# Patient Record
Sex: MALE | Race: WHITE | NOT HISPANIC OR LATINO | Employment: OTHER | URBAN - METROPOLITAN AREA
[De-identification: names, ages, dates, MRNs, and addresses within clinical notes are randomized per-mention and may not be internally consistent; named-entity substitution may affect disease eponyms.]

---

## 2017-02-28 ENCOUNTER — TRANSCRIBE ORDERS (OUTPATIENT)
Dept: LAB | Facility: CLINIC | Age: 67
End: 2017-02-28

## 2017-02-28 ENCOUNTER — APPOINTMENT (OUTPATIENT)
Dept: LAB | Facility: CLINIC | Age: 67
End: 2017-02-28
Payer: COMMERCIAL

## 2017-02-28 DIAGNOSIS — N18.30 CHRONIC KIDNEY DISEASE, STAGE III (MODERATE) (HCC): ICD-10-CM

## 2017-02-28 DIAGNOSIS — E78.5 HYPERLIPIDEMIA: ICD-10-CM

## 2017-02-28 DIAGNOSIS — M19.90 OSTEOARTHRITIS: ICD-10-CM

## 2017-02-28 DIAGNOSIS — R94.6 ABNORMAL RESULTS OF THYROID FUNCTION STUDIES: ICD-10-CM

## 2017-02-28 DIAGNOSIS — M25.562 PAIN IN LEFT KNEE: ICD-10-CM

## 2017-02-28 DIAGNOSIS — I10 ESSENTIAL (PRIMARY) HYPERTENSION: ICD-10-CM

## 2017-02-28 DIAGNOSIS — R73.9 HYPERGLYCEMIA: ICD-10-CM

## 2017-02-28 DIAGNOSIS — R26.9 ABNORMALITY OF GAIT AND MOBILITY: ICD-10-CM

## 2017-02-28 LAB
ALBUMIN SERPL BCP-MCNC: 4 G/DL (ref 3.5–5)
ALP SERPL-CCNC: 80 U/L (ref 46–116)
ALT SERPL W P-5'-P-CCNC: 21 U/L (ref 12–78)
ANION GAP SERPL CALCULATED.3IONS-SCNC: 6 MMOL/L (ref 4–13)
AST SERPL W P-5'-P-CCNC: 14 U/L (ref 5–45)
BILIRUB SERPL-MCNC: 1.05 MG/DL (ref 0.2–1)
BILIRUB UR QL STRIP: NEGATIVE
BUN SERPL-MCNC: 24 MG/DL (ref 5–25)
CALCIUM SERPL-MCNC: 9.5 MG/DL (ref 8.3–10.1)
CHLORIDE SERPL-SCNC: 105 MMOL/L (ref 100–108)
CHOLEST SERPL-MCNC: 184 MG/DL (ref 50–200)
CLARITY UR: CLEAR
CO2 SERPL-SCNC: 30 MMOL/L (ref 21–32)
COLOR UR: YELLOW
CREAT SERPL-MCNC: 1.63 MG/DL (ref 0.6–1.3)
ERYTHROCYTE [DISTWIDTH] IN BLOOD BY AUTOMATED COUNT: 12.5 % (ref 11.6–15.1)
EST. AVERAGE GLUCOSE BLD GHB EST-MCNC: 111 MG/DL
GFR SERPL CREATININE-BSD FRML MDRD: 42.4 ML/MIN/1.73SQ M
GLUCOSE SERPL-MCNC: 101 MG/DL (ref 65–140)
GLUCOSE UR STRIP-MCNC: NEGATIVE MG/DL
HBA1C MFR BLD: 5.5 % (ref 4.2–6.3)
HCT VFR BLD AUTO: 43.7 % (ref 36.5–49.3)
HDLC SERPL-MCNC: 39 MG/DL (ref 40–60)
HGB BLD-MCNC: 14.8 G/DL (ref 12–17)
HGB UR QL STRIP.AUTO: NEGATIVE
KETONES UR STRIP-MCNC: NEGATIVE MG/DL
LDLC SERPL CALC-MCNC: 104 MG/DL (ref 0–100)
LEUKOCYTE ESTERASE UR QL STRIP: NEGATIVE
MCH RBC QN AUTO: 30.5 PG (ref 26.8–34.3)
MCHC RBC AUTO-ENTMCNC: 33.9 G/DL (ref 31.4–37.4)
MCV RBC AUTO: 90 FL (ref 82–98)
NITRITE UR QL STRIP: NEGATIVE
PH UR STRIP.AUTO: 6.5 [PH] (ref 4.5–8)
PLATELET # BLD AUTO: 279 THOUSANDS/UL (ref 149–390)
PMV BLD AUTO: 9.5 FL (ref 8.9–12.7)
POTASSIUM SERPL-SCNC: 4.3 MMOL/L (ref 3.5–5.3)
PROT SERPL-MCNC: 7.2 G/DL (ref 6.4–8.2)
PROT UR STRIP-MCNC: NEGATIVE MG/DL
RBC # BLD AUTO: 4.86 MILLION/UL (ref 3.88–5.62)
SODIUM SERPL-SCNC: 141 MMOL/L (ref 136–145)
SP GR UR STRIP.AUTO: 1.02 (ref 1–1.03)
TRIGL SERPL-MCNC: 206 MG/DL
TSH SERPL DL<=0.05 MIU/L-ACNC: 3.35 UIU/ML (ref 0.36–3.74)
UROBILINOGEN UR QL STRIP.AUTO: 0.2 E.U./DL
WBC # BLD AUTO: 8.32 THOUSAND/UL (ref 4.31–10.16)

## 2017-02-28 PROCEDURE — 36415 COLL VENOUS BLD VENIPUNCTURE: CPT

## 2017-02-28 PROCEDURE — 83036 HEMOGLOBIN GLYCOSYLATED A1C: CPT

## 2017-02-28 PROCEDURE — 85027 COMPLETE CBC AUTOMATED: CPT

## 2017-02-28 PROCEDURE — 80053 COMPREHEN METABOLIC PANEL: CPT

## 2017-02-28 PROCEDURE — 84443 ASSAY THYROID STIM HORMONE: CPT

## 2017-02-28 PROCEDURE — 80061 LIPID PANEL: CPT

## 2017-02-28 PROCEDURE — 81003 URINALYSIS AUTO W/O SCOPE: CPT

## 2017-03-06 ENCOUNTER — ALLSCRIPTS OFFICE VISIT (OUTPATIENT)
Dept: OTHER | Facility: OTHER | Age: 67
End: 2017-03-06

## 2017-04-24 ENCOUNTER — TRANSCRIBE ORDERS (OUTPATIENT)
Dept: LAB | Facility: CLINIC | Age: 67
End: 2017-04-24

## 2017-04-24 ENCOUNTER — APPOINTMENT (OUTPATIENT)
Dept: LAB | Facility: CLINIC | Age: 67
End: 2017-04-24
Payer: COMMERCIAL

## 2017-04-24 DIAGNOSIS — N18.30 CHRONIC KIDNEY DISEASE, STAGE III (MODERATE) (HCC): ICD-10-CM

## 2017-04-24 DIAGNOSIS — R94.6 NONSPECIFIC ABNORMAL RESULTS OF THYROID FUNCTION STUDY: ICD-10-CM

## 2017-04-24 DIAGNOSIS — N40.0 BENIGN NODULAR PROSTATIC HYPERPLASIA, PRESENCE OF LOWER URINARY TRACT SYMPTOMS UNSPECIFIED: Primary | ICD-10-CM

## 2017-04-24 DIAGNOSIS — C64.9 ADENOCARCINOMA, RENAL CELL, UNSPECIFIED LATERALITY (HCC): ICD-10-CM

## 2017-04-24 DIAGNOSIS — M19.90 SENILE ARTHRITIS: ICD-10-CM

## 2017-04-24 DIAGNOSIS — E78.5 OTHER AND UNSPECIFIED HYPERLIPIDEMIA: ICD-10-CM

## 2017-04-24 DIAGNOSIS — I10 ESSENTIAL HYPERTENSION, MALIGNANT: ICD-10-CM

## 2017-04-24 DIAGNOSIS — R26.9 ABNORMALITY OF GAIT: ICD-10-CM

## 2017-04-24 DIAGNOSIS — K63.5 HYPERPLASTIC POLYP OF INTESTINE: ICD-10-CM

## 2017-04-24 DIAGNOSIS — R73.9 BLOOD GLUCOSE ELEVATED: ICD-10-CM

## 2017-04-24 LAB — HEMOCCULT STL QL IA: NEGATIVE

## 2017-04-24 PROCEDURE — G0328 FECAL BLOOD SCRN IMMUNOASSAY: HCPCS

## 2017-04-25 ENCOUNTER — GENERIC CONVERSION - ENCOUNTER (OUTPATIENT)
Dept: OTHER | Facility: OTHER | Age: 67
End: 2017-04-25

## 2017-06-01 ENCOUNTER — OFFICE VISIT (OUTPATIENT)
Dept: URGENT CARE | Facility: MEDICAL CENTER | Age: 67
End: 2017-06-01
Payer: COMMERCIAL

## 2017-06-01 PROCEDURE — 99213 OFFICE O/P EST LOW 20 MIN: CPT

## 2017-09-06 DIAGNOSIS — N18.30 CHRONIC KIDNEY DISEASE, STAGE III (MODERATE) (HCC): ICD-10-CM

## 2017-09-06 DIAGNOSIS — M19.90 OSTEOARTHRITIS: ICD-10-CM

## 2017-09-06 DIAGNOSIS — E78.5 HYPERLIPIDEMIA: ICD-10-CM

## 2017-09-06 DIAGNOSIS — N40.0 ENLARGED PROSTATE WITHOUT LOWER URINARY TRACT SYMPTOMS (LUTS): ICD-10-CM

## 2017-09-06 DIAGNOSIS — R73.9 HYPERGLYCEMIA: ICD-10-CM

## 2017-09-06 DIAGNOSIS — N52.9 MALE ERECTILE DYSFUNCTION: ICD-10-CM

## 2017-09-06 DIAGNOSIS — I10 ESSENTIAL (PRIMARY) HYPERTENSION: ICD-10-CM

## 2017-09-06 DIAGNOSIS — K63.5 POLYP OF COLON: ICD-10-CM

## 2017-09-06 DIAGNOSIS — Z11.59 ENCOUNTER FOR SCREENING FOR OTHER VIRAL DISEASES: ICD-10-CM

## 2017-09-14 ENCOUNTER — TRANSCRIBE ORDERS (OUTPATIENT)
Dept: LAB | Facility: CLINIC | Age: 67
End: 2017-09-14

## 2017-09-14 ENCOUNTER — APPOINTMENT (OUTPATIENT)
Dept: LAB | Facility: CLINIC | Age: 67
End: 2017-09-14
Payer: COMMERCIAL

## 2017-09-14 DIAGNOSIS — M19.90 OSTEOARTHRITIS: ICD-10-CM

## 2017-09-14 DIAGNOSIS — N52.9 MALE ERECTILE DYSFUNCTION: ICD-10-CM

## 2017-09-14 DIAGNOSIS — I10 ESSENTIAL (PRIMARY) HYPERTENSION: ICD-10-CM

## 2017-09-14 DIAGNOSIS — E78.5 HYPERLIPIDEMIA: ICD-10-CM

## 2017-09-14 DIAGNOSIS — N18.30 CHRONIC KIDNEY DISEASE, STAGE III (MODERATE) (HCC): ICD-10-CM

## 2017-09-14 DIAGNOSIS — N40.0 ENLARGED PROSTATE WITHOUT LOWER URINARY TRACT SYMPTOMS (LUTS): ICD-10-CM

## 2017-09-14 DIAGNOSIS — K63.5 POLYP OF COLON: ICD-10-CM

## 2017-09-14 DIAGNOSIS — R73.9 HYPERGLYCEMIA: ICD-10-CM

## 2017-09-14 LAB
ALBUMIN SERPL BCP-MCNC: 3.8 G/DL (ref 3.5–5)
ALP SERPL-CCNC: 84 U/L (ref 46–116)
ALT SERPL W P-5'-P-CCNC: 18 U/L (ref 12–78)
ANION GAP SERPL CALCULATED.3IONS-SCNC: 8 MMOL/L (ref 4–13)
AST SERPL W P-5'-P-CCNC: 14 U/L (ref 5–45)
BILIRUB SERPL-MCNC: 1.13 MG/DL (ref 0.2–1)
BILIRUB UR QL STRIP: NEGATIVE
BUN SERPL-MCNC: 23 MG/DL (ref 5–25)
CALCIUM SERPL-MCNC: 9.7 MG/DL (ref 8.3–10.1)
CHLORIDE SERPL-SCNC: 105 MMOL/L (ref 100–108)
CHOLEST SERPL-MCNC: 166 MG/DL (ref 50–200)
CLARITY UR: CLEAR
CO2 SERPL-SCNC: 27 MMOL/L (ref 21–32)
COLOR UR: YELLOW
CREAT SERPL-MCNC: 1.6 MG/DL (ref 0.6–1.3)
ERYTHROCYTE [DISTWIDTH] IN BLOOD BY AUTOMATED COUNT: 12.4 % (ref 11.6–15.1)
EST. AVERAGE GLUCOSE BLD GHB EST-MCNC: 114 MG/DL
GFR SERPL CREATININE-BSD FRML MDRD: 44 ML/MIN/1.73SQ M
GLUCOSE P FAST SERPL-MCNC: 102 MG/DL (ref 65–99)
GLUCOSE UR STRIP-MCNC: NEGATIVE MG/DL
HBA1C MFR BLD: 5.6 % (ref 4.2–6.3)
HCT VFR BLD AUTO: 41.1 % (ref 36.5–49.3)
HDLC SERPL-MCNC: 32 MG/DL (ref 40–60)
HGB BLD-MCNC: 14.5 G/DL (ref 12–17)
HGB UR QL STRIP.AUTO: NEGATIVE
KETONES UR STRIP-MCNC: NEGATIVE MG/DL
LDLC SERPL CALC-MCNC: 87 MG/DL (ref 0–100)
LEUKOCYTE ESTERASE UR QL STRIP: NEGATIVE
MCH RBC QN AUTO: 30.9 PG (ref 26.8–34.3)
MCHC RBC AUTO-ENTMCNC: 35.3 G/DL (ref 31.4–37.4)
MCV RBC AUTO: 87 FL (ref 82–98)
NITRITE UR QL STRIP: NEGATIVE
PH UR STRIP.AUTO: 6 [PH] (ref 4.5–8)
PLATELET # BLD AUTO: 276 THOUSANDS/UL (ref 149–390)
PMV BLD AUTO: 9.5 FL (ref 8.9–12.7)
POTASSIUM SERPL-SCNC: 4.4 MMOL/L (ref 3.5–5.3)
PROT SERPL-MCNC: 6.9 G/DL (ref 6.4–8.2)
PROT UR STRIP-MCNC: NEGATIVE MG/DL
RBC # BLD AUTO: 4.7 MILLION/UL (ref 3.88–5.62)
SODIUM SERPL-SCNC: 140 MMOL/L (ref 136–145)
SP GR UR STRIP.AUTO: 1.02 (ref 1–1.03)
T4 FREE SERPL-MCNC: 0.92 NG/DL (ref 0.76–1.46)
TRIGL SERPL-MCNC: 236 MG/DL
TSH SERPL DL<=0.05 MIU/L-ACNC: 4.14 UIU/ML (ref 0.36–3.74)
UROBILINOGEN UR QL STRIP.AUTO: 0.2 E.U./DL
WBC # BLD AUTO: 9.35 THOUSAND/UL (ref 4.31–10.16)

## 2017-09-14 PROCEDURE — 84439 ASSAY OF FREE THYROXINE: CPT

## 2017-09-14 PROCEDURE — 85027 COMPLETE CBC AUTOMATED: CPT

## 2017-09-14 PROCEDURE — 80053 COMPREHEN METABOLIC PANEL: CPT

## 2017-09-14 PROCEDURE — 81003 URINALYSIS AUTO W/O SCOPE: CPT

## 2017-09-14 PROCEDURE — 80061 LIPID PANEL: CPT

## 2017-09-14 PROCEDURE — 84443 ASSAY THYROID STIM HORMONE: CPT

## 2017-09-14 PROCEDURE — 83036 HEMOGLOBIN GLYCOSYLATED A1C: CPT

## 2017-09-14 PROCEDURE — 36415 COLL VENOUS BLD VENIPUNCTURE: CPT

## 2017-09-20 ENCOUNTER — ALLSCRIPTS OFFICE VISIT (OUTPATIENT)
Dept: OTHER | Facility: OTHER | Age: 67
End: 2017-09-20

## 2018-01-12 VITALS
SYSTOLIC BLOOD PRESSURE: 126 MMHG | DIASTOLIC BLOOD PRESSURE: 82 MMHG | WEIGHT: 269 LBS | HEART RATE: 80 BPM | BODY MASS INDEX: 31.09 KG/M2

## 2018-01-12 VITALS
DIASTOLIC BLOOD PRESSURE: 76 MMHG | HEART RATE: 68 BPM | HEIGHT: 78 IN | WEIGHT: 274.4 LBS | SYSTOLIC BLOOD PRESSURE: 136 MMHG | BODY MASS INDEX: 31.75 KG/M2

## 2018-01-16 NOTE — RESULT NOTES
Verified Results  (1) OCCULT BLOOD, FECAL IMMUNOCHEMICAL TEST [id number] 10:42AM Alex Maradiaga     Test Name Result Flag Reference   OCCULT BLD, FECAL IMMUNOLOGICAL Negative  Negative   Performed by Fecal Immunochemical Test

## 2018-01-18 NOTE — RESULT NOTES
Verified Results  (1) CBC/ PLT (NO DIFF) [id number] 09:22AM Reymundo Kussmaul Order Number: [id number]     Test Name Result Flag Reference   HEMATOCRIT 41 6 %  36 5-49 3   HEMOGLOBIN 14 0 g/dL  12 0-17 0   MCHC 33 7 g/dL  31 4-37 4   MCH 30 0 pg  26 8-34 3   MCV 89 fL  82-98   PLATELET COUNT 027 Thousands/uL  149-390   RBC COUNT 4 67 Million/uL  3 88-5 62   RDW 12 7 %  11 6-15 1   WBC COUNT 7 92 Thousand/uL  4 31-10 16   MPV 9 3 fL  8 9-12 7     (1) COMPREHENSIVE METABOLIC PANEL [id number] 19:67LB Lanny Maradiaga Order Number: [id number]     Test Name Result Flag Reference   GLUCOSE,RANDM 106 mg/dL     If the patient is fasting, the ADA then defines impaired fasting glucose as > 100 mg/dL and diabetes as > or equal to 123 mg/dL  SODIUM 141 mmol/L  136-145   POTASSIUM 4 4 mmol/L  3 5-5 3   CHLORIDE 108 mmol/L  100-108   CARBON DIOXIDE 26 mmol/L  21-32   ANION GAP (CALC) 7 mmol/L  4-13   BLOOD UREA NITROGEN 24 mg/dL  5-25   CREATININE 1 67 mg/dL H 0 60-1 30   Standardized to IDMS reference method   CALCIUM 9 2 mg/dL  8 3-10 1   BILI, TOTAL 0 90 mg/dL  0 20-1 00   ALK PHOSPHATAS 75 U/L     ALT (SGPT) 19 U/L  12-78   AST(SGOT) 13 U/L  5-45   ALBUMIN 4 0 g/dL  3 5-5 0   TOTAL PROTEIN 6 6 g/dL  6 4-8 2   eGFR Non-African American 41 4 ml/min/1 73sq m     [facility] [employer] Disease Education Program recommendations are as follows:  GFR calculation is accurate only with a steady state creatinine  Chronic Kidney disease less than 60 ml/min/1 73 sq  meters  Kidney failure less than 15 ml/min/1 73 sq  meters  (1) HEMOGLOBIN A1C [id number] 09:22AM Mary Chun Order Number: [id number]     Test Name Result Flag Reference   HEMOGLOBIN A1C 5 4 %  4 2-6 3   EST  AVG   GLUCOSE 108 mg/dl       (1) INSULIN [id number] 09:22AM Mary Chun Order Number: [id number]     Test Name Result Flag Reference   INSULIN 11 9 mU/L  3 0-25 0     (1) LIPID PANEL, FASTING [id number] 09:22AM Pati Feliz Order Number: [id number]     Test Name Result Flag Reference   CHOLESTEROL 195 mg/dL     HDL,DIRECT 38 mg/dL L 40-60   Specimen collection should occur prior to Metamizole administration due to the potential for falsely depressed results  LDL CHOLESTEROL CALCULATED 128 mg/dL H 0-100   Triglyceride:         Normal              <150 mg/dl       Borderline High    150-199 mg/dl       High               200-499 mg/dl       Very High          >499 mg/dl  Cholesterol:         Desirable        <200 mg/dl      Borderline High  200-239 mg/dl      High             >239 mg/dl  HDL Cholesterol:        High    >59 mg/dL      Low     <41 mg/dL  LDL CALCULATED:    This screening LDL is a calculated result  It does not have the accuracy of the Direct Measured LDL in the monitoring of patients with hyperlipidemia and/or statin therapy  Direct Measure LDL (MLG422) must be ordered separately in these patients  TRIGLYCERIDES 146 mg/dL  <=150   Specimen collection should occur prior to N-Acetylcysteine or Metamizole administration due to the potential for falsely depressed results  (1) TSH WITH FT4 REFLEX [id number] 09:22AM Carlos Maradiaga Order Number: [id number]     Test Name Result Flag Reference   TSH 2 700 uIU/mL  0 358-3 740   Patients undergoing fluorescein dye angiography may retain small amounts of fluorescein in the body for 48-72 hours post procedure  Samples containing fluorescein can produce falsely depressed TSH values  If the patient had this procedure,a specimen should be resubmitted post fluorescein clearance       (1) URINALYSIS (will reflex a microscopy if leukocytes, occult blood, protein or nitrites are not within normal limits) [id number] 09:22AM Pati Piper Order Number: [id number]     Test Name Result Flag Reference   COLOR Yellow     CLARITY Clear     SPECIFIC GRAVITY UA 1 021  1 003-1 030   PH UA 6 0  4 5-8 0   LEUKOCYTE ESTERASE UA Negative Negative   NITRITE UA Negative  Negative   PROTEIN UA Negative mg/dl  Negative   GLUCOSE UA Negative mg/dl  Negative   KETONES UA Negative mg/dl  Negative   UROBILINOGEN UA 0 2 E U /dl  0 2, 1 0 E U /dl   BILIRUBIN UA Negative  Negative   BLOOD UA Negative  Negative

## 2018-03-12 ENCOUNTER — TELEPHONE (OUTPATIENT)
Dept: UROLOGY | Facility: AMBULATORY SURGERY CENTER | Age: 68
End: 2018-03-12

## 2018-03-12 NOTE — TELEPHONE ENCOUNTER
Patient was wondering if he needs any testing for his yearly follow up appointment with Dr Kim Patel?  Thanks

## 2018-03-12 NOTE — TELEPHONE ENCOUNTER
Returned pt's call he was last seen 02/16 and Dr Federico Cameron wanted labs,xray, and Us  Informed pt that he should come in for appt and they will discuss from there what Dr Federico Cameron would like to order

## 2018-03-16 ENCOUNTER — APPOINTMENT (OUTPATIENT)
Dept: LAB | Facility: CLINIC | Age: 68
End: 2018-03-16
Payer: COMMERCIAL

## 2018-03-16 ENCOUNTER — TRANSCRIBE ORDERS (OUTPATIENT)
Dept: LAB | Facility: CLINIC | Age: 68
End: 2018-03-16

## 2018-03-16 DIAGNOSIS — R94.6 ABNORMAL RESULTS OF THYROID FUNCTION STUDIES: ICD-10-CM

## 2018-03-16 DIAGNOSIS — E78.5 HYPERLIPIDEMIA: ICD-10-CM

## 2018-03-16 DIAGNOSIS — N40.0 ENLARGED PROSTATE WITHOUT LOWER URINARY TRACT SYMPTOMS (LUTS): ICD-10-CM

## 2018-03-16 DIAGNOSIS — R73.9 HYPERGLYCEMIA: ICD-10-CM

## 2018-03-16 DIAGNOSIS — I10 ESSENTIAL (PRIMARY) HYPERTENSION: ICD-10-CM

## 2018-03-16 DIAGNOSIS — M19.90 OSTEOARTHRITIS: ICD-10-CM

## 2018-03-16 DIAGNOSIS — N18.30 CHRONIC KIDNEY DISEASE, STAGE III (MODERATE) (HCC): ICD-10-CM

## 2018-03-16 LAB
ALBUMIN SERPL BCP-MCNC: 4 G/DL (ref 3.5–5)
ALP SERPL-CCNC: 76 U/L (ref 46–116)
ALT SERPL W P-5'-P-CCNC: 28 U/L (ref 12–78)
ANION GAP SERPL CALCULATED.3IONS-SCNC: 4 MMOL/L (ref 4–13)
AST SERPL W P-5'-P-CCNC: 18 U/L (ref 5–45)
BILIRUB SERPL-MCNC: 1.05 MG/DL (ref 0.2–1)
BILIRUB UR QL STRIP: NEGATIVE
BUN SERPL-MCNC: 27 MG/DL (ref 5–25)
CALCIUM SERPL-MCNC: 9.2 MG/DL (ref 8.3–10.1)
CHLORIDE SERPL-SCNC: 106 MMOL/L (ref 100–108)
CHOLEST SERPL-MCNC: 189 MG/DL (ref 50–200)
CLARITY UR: CLEAR
CO2 SERPL-SCNC: 30 MMOL/L (ref 21–32)
COLOR UR: YELLOW
CREAT SERPL-MCNC: 1.55 MG/DL (ref 0.6–1.3)
ERYTHROCYTE [DISTWIDTH] IN BLOOD BY AUTOMATED COUNT: 12.3 % (ref 11.6–15.1)
EST. AVERAGE GLUCOSE BLD GHB EST-MCNC: 114 MG/DL
GFR SERPL CREATININE-BSD FRML MDRD: 45 ML/MIN/1.73SQ M
GLUCOSE P FAST SERPL-MCNC: 101 MG/DL (ref 65–99)
GLUCOSE UR STRIP-MCNC: NEGATIVE MG/DL
HBA1C MFR BLD: 5.6 % (ref 4.2–6.3)
HCT VFR BLD AUTO: 43.1 % (ref 36.5–49.3)
HDLC SERPL-MCNC: 35 MG/DL (ref 40–60)
HGB BLD-MCNC: 15 G/DL (ref 12–17)
HGB UR QL STRIP.AUTO: NEGATIVE
KETONES UR STRIP-MCNC: NEGATIVE MG/DL
LDLC SERPL CALC-MCNC: 105 MG/DL (ref 0–100)
LEUKOCYTE ESTERASE UR QL STRIP: NEGATIVE
MCH RBC QN AUTO: 30.9 PG (ref 26.8–34.3)
MCHC RBC AUTO-ENTMCNC: 34.8 G/DL (ref 31.4–37.4)
MCV RBC AUTO: 89 FL (ref 82–98)
NITRITE UR QL STRIP: NEGATIVE
PH UR STRIP.AUTO: 6.5 [PH] (ref 4.5–8)
PLATELET # BLD AUTO: 301 THOUSANDS/UL (ref 149–390)
PMV BLD AUTO: 9.3 FL (ref 8.9–12.7)
POTASSIUM SERPL-SCNC: 4 MMOL/L (ref 3.5–5.3)
PROT SERPL-MCNC: 7.2 G/DL (ref 6.4–8.2)
PROT UR STRIP-MCNC: NEGATIVE MG/DL
RBC # BLD AUTO: 4.85 MILLION/UL (ref 3.88–5.62)
SODIUM SERPL-SCNC: 140 MMOL/L (ref 136–145)
SP GR UR STRIP.AUTO: 1.02 (ref 1–1.03)
T4 FREE SERPL-MCNC: 0.9 NG/DL (ref 0.76–1.46)
TRIGL SERPL-MCNC: 245 MG/DL
TSH SERPL DL<=0.05 MIU/L-ACNC: 3.41 UIU/ML (ref 0.36–3.74)
UROBILINOGEN UR QL STRIP.AUTO: 0.2 E.U./DL
WBC # BLD AUTO: 9.2 THOUSAND/UL (ref 4.31–10.16)

## 2018-03-16 PROCEDURE — 83036 HEMOGLOBIN GLYCOSYLATED A1C: CPT

## 2018-03-16 PROCEDURE — 81003 URINALYSIS AUTO W/O SCOPE: CPT

## 2018-03-16 PROCEDURE — 84443 ASSAY THYROID STIM HORMONE: CPT

## 2018-03-16 PROCEDURE — 80061 LIPID PANEL: CPT

## 2018-03-16 PROCEDURE — 85027 COMPLETE CBC AUTOMATED: CPT

## 2018-03-16 PROCEDURE — 80053 COMPREHEN METABOLIC PANEL: CPT

## 2018-03-16 PROCEDURE — 36415 COLL VENOUS BLD VENIPUNCTURE: CPT

## 2018-03-16 PROCEDURE — 84439 ASSAY OF FREE THYROXINE: CPT

## 2018-03-20 DIAGNOSIS — M19.90 OSTEOARTHRITIS: ICD-10-CM

## 2018-03-20 DIAGNOSIS — R73.9 HYPERGLYCEMIA: ICD-10-CM

## 2018-03-20 DIAGNOSIS — N18.30 CHRONIC KIDNEY DISEASE, STAGE III (MODERATE) (HCC): ICD-10-CM

## 2018-03-20 DIAGNOSIS — I10 ESSENTIAL (PRIMARY) HYPERTENSION: ICD-10-CM

## 2018-03-20 DIAGNOSIS — N40.0 ENLARGED PROSTATE WITHOUT LOWER URINARY TRACT SYMPTOMS (LUTS): ICD-10-CM

## 2018-03-20 DIAGNOSIS — E78.5 HYPERLIPIDEMIA: ICD-10-CM

## 2018-03-20 DIAGNOSIS — R94.6 ABNORMAL RESULTS OF THYROID FUNCTION STUDIES: ICD-10-CM

## 2018-03-27 ENCOUNTER — OFFICE VISIT (OUTPATIENT)
Dept: FAMILY MEDICINE CLINIC | Facility: CLINIC | Age: 68
End: 2018-03-27
Payer: COMMERCIAL

## 2018-03-27 ENCOUNTER — APPOINTMENT (OUTPATIENT)
Dept: LAB | Facility: CLINIC | Age: 68
End: 2018-03-27
Payer: COMMERCIAL

## 2018-03-27 VITALS
HEIGHT: 78 IN | HEART RATE: 68 BPM | WEIGHT: 269 LBS | DIASTOLIC BLOOD PRESSURE: 76 MMHG | SYSTOLIC BLOOD PRESSURE: 130 MMHG | BODY MASS INDEX: 31.12 KG/M2 | RESPIRATION RATE: 20 BRPM

## 2018-03-27 DIAGNOSIS — E78.5 HYPERLIPIDEMIA, UNSPECIFIED HYPERLIPIDEMIA TYPE: ICD-10-CM

## 2018-03-27 DIAGNOSIS — N40.0 BENIGN PROSTATIC HYPERPLASIA, UNSPECIFIED WHETHER LOWER URINARY TRACT SYMPTOMS PRESENT: ICD-10-CM

## 2018-03-27 DIAGNOSIS — M15.9 OSTEOARTHRITIS OF MULTIPLE JOINTS, UNSPECIFIED OSTEOARTHRITIS TYPE: ICD-10-CM

## 2018-03-27 DIAGNOSIS — R94.6 THYROID FUNCTION STUDY ABNORMALITY: ICD-10-CM

## 2018-03-27 DIAGNOSIS — I10 ESSENTIAL HYPERTENSION: Primary | ICD-10-CM

## 2018-03-27 DIAGNOSIS — R73.9 HYPERGLYCEMIA: ICD-10-CM

## 2018-03-27 DIAGNOSIS — N18.30 CHRONIC KIDNEY DISEASE, STAGE 3 (HCC): ICD-10-CM

## 2018-03-27 LAB — PSA SERPL-MCNC: 0.3 NG/ML (ref 0–4)

## 2018-03-27 PROCEDURE — G0103 PSA SCREENING: HCPCS | Performed by: FAMILY MEDICINE

## 2018-03-27 PROCEDURE — 99214 OFFICE O/P EST MOD 30 MIN: CPT | Performed by: FAMILY MEDICINE

## 2018-03-27 PROCEDURE — 36415 COLL VENOUS BLD VENIPUNCTURE: CPT | Performed by: FAMILY MEDICINE

## 2018-03-27 RX ORDER — VALSARTAN 160 MG/1
160 TABLET ORAL DAILY
Qty: 90 TABLET | Refills: 3 | Status: SHIPPED | OUTPATIENT
Start: 2018-03-27 | End: 2018-07-19

## 2018-03-27 NOTE — PROGRESS NOTES
Assessment/Plan:  1  Hypertension, stable continue present therapy  2  Hyperglycemia, diet-controlled  3  Hyperlipidemia, diet controlled  4  Thyroid function test abnormality, normal the present time continue to monitor  5  Chronic kidney disease-3, stable continue to monitor  6  BPH, check PSA patient follow up with Urology  7  DJD, stable  8  Patient to return in 6 months, sooner if needed        Hypertension  Stable continue present therapy    Osteoarthritis  Stable, asymptomatic at the present time    Benign prostatic hyperplasia  Check PSA follow-up with Urology    Chronic kidney disease, stage 3  Stable continue to monitor    Hyperglycemia  Diet controlled    Hyperlipidemia  Diet controlled    Thyroid function study abnormality  Normal the present time will continue to monitor       Diagnoses and all orders for this visit:    Essential hypertension  -     valsartan (DIOVAN) 160 mg tablet; Take 1 tablet (160 mg total) by mouth daily  -     Comprehensive metabolic panel; Future  -     Lipid Panel with Direct LDL reflex; Future  -     TSH, 3rd generation with T4 reflex; Future  -     Urinalysis with reflex to microscopic; Future    Osteoarthritis of multiple joints, unspecified osteoarthritis type    Benign prostatic hyperplasia, unspecified whether lower urinary tract symptoms present  -     PSA    Chronic kidney disease, stage 3  -     Comprehensive metabolic panel; Future  -     Lipid Panel with Direct LDL reflex; Future  -     TSH, 3rd generation with T4 reflex; Future  -     Urinalysis with reflex to microscopic; Future    Hyperglycemia  -     Comprehensive metabolic panel; Future  -     Lipid Panel with Direct LDL reflex; Future  -     TSH, 3rd generation with T4 reflex; Future  -     Urinalysis with reflex to microscopic; Future    Hyperlipidemia, unspecified hyperlipidemia type  -     Comprehensive metabolic panel; Future  -     Lipid Panel with Direct LDL reflex;  Future  -     TSH, 3rd generation with T4 reflex; Future  -     Urinalysis with reflex to microscopic; Future    Thyroid function study abnormality  -     Comprehensive metabolic panel; Future  -     Lipid Panel with Direct LDL reflex; Future  -     TSH, 3rd generation with T4 reflex; Future  -     Urinalysis with reflex to microscopic; Future          Subjective:     Cc: Follow up and to review blood work results  MATHIEU Clement     Patient ID: Michael Tobias is a 75 y o  male  Patient doing well without any current new medical complaints or concerns  Patient does have an appointment next month with his urologist we will check a PSA so he has is completed before his appointment        The following portions of the patient's history were reviewed and updated as appropriate: allergies, current medications, past family history, past medical history, past social history, past surgical history and problem list     Review of Systems   Constitutional: Negative  HENT: Negative  Eyes: Negative  Respiratory: Negative  Cardiovascular: Negative  Gastrointestinal: Negative  Endocrine: Negative  Genitourinary:        HPI   Musculoskeletal: Negative  Skin: Negative  Allergic/Immunologic: Negative  Neurological: Negative  Hematological: Negative  Psychiatric/Behavioral: Negative  Objective:        Vitals:    03/27/18 0857   BP: 130/76   BP Location: Left arm   Patient Position: Sitting   Cuff Size: Large   Pulse: 68   Resp: 20   Weight: 122 kg (269 lb)   Height: 6' 6" (1 981 m)          Physical Exam   Constitutional: He is oriented to person, place, and time  He appears well-developed and well-nourished  HENT:   Head: Normocephalic and atraumatic  Mouth/Throat: Oropharynx is clear and moist    Eyes: Conjunctivae are normal    Neck: Neck supple  Cardiovascular: Normal rate, regular rhythm and normal heart sounds  Pulmonary/Chest: Effort normal and breath sounds normal    Abdominal: Soft   Bowel sounds are normal    Musculoskeletal: He exhibits no edema  Lymphadenopathy:     He has no cervical adenopathy  Neurological: He is alert and oriented to person, place, and time  Skin: Skin is warm and dry  Psychiatric: He has a normal mood and affect

## 2018-04-04 ENCOUNTER — OFFICE VISIT (OUTPATIENT)
Dept: UROLOGY | Facility: MEDICAL CENTER | Age: 68
End: 2018-04-04
Payer: COMMERCIAL

## 2018-04-04 VITALS
SYSTOLIC BLOOD PRESSURE: 122 MMHG | BODY MASS INDEX: 31.53 KG/M2 | DIASTOLIC BLOOD PRESSURE: 70 MMHG | WEIGHT: 267 LBS | HEIGHT: 77 IN

## 2018-04-04 DIAGNOSIS — C64.1 MALIGNANT NEOPLASM OF RIGHT KIDNEY, EXCEPT RENAL PELVIS (HCC): ICD-10-CM

## 2018-04-04 DIAGNOSIS — R35.1 BENIGN PROSTATIC HYPERPLASIA WITH NOCTURIA: Primary | ICD-10-CM

## 2018-04-04 DIAGNOSIS — N40.1 BENIGN PROSTATIC HYPERPLASIA WITH NOCTURIA: Primary | ICD-10-CM

## 2018-04-04 PROCEDURE — 99214 OFFICE O/P EST MOD 30 MIN: CPT | Performed by: UROLOGY

## 2018-04-04 NOTE — PROGRESS NOTES
Assessment/Plan:    Malignant neoplasm of right kidney, except renal pelvis (HCC)  Status post right radical nephrectomy in 2006  Liver profile is normal   Normal left kidney on ultrasound 2015  CXR normal 2014    Benign prostatic hyperplasia with nocturia  AUA symptom score is 12  He is satisfied with his voiding pattern  PSA is 0 3 on March 27, 2018  We will plan to recheck PSA in 1 year  Continue to follow voiding pattern with watchful waiting  Diagnoses and all orders for this visit:    Benign prostatic hyperplasia with nocturia  -     PSA Total, Diagnostic; Future    Malignant neoplasm of right kidney, except renal pelvis (HCC)  -     XR chest pa & lateral; Future          Subjective:      Patient ID: José Arias is a 75 y o  male  75year old male followed for lower urinary tract symptoms and a remote history of renal cell carcinoma  He is status post right radical nephrectomy in 2008  He notes he is voiding adequately  His stream is fair and he feels he empties well  There is no gross hematuria, dysuria or symptoms of infection  He does get up 3 times at night to urinate  He notes that he drinks lots of fluids in the evening  He continues to work out 3 times a week  He has no back or bone pain and no constitutional symptoms  He is satisfied with his voiding pattern  The following portions of the patient's history were reviewed and updated as appropriate: allergies, current medications, past family history, past medical history, past social history, past surgical history and problem list     Review of Systems   Constitutional: Negative for chills, diaphoresis, fatigue and fever  HENT: Negative  Eyes: Negative  Respiratory: Negative  Cardiovascular: Negative  Endocrine: Negative  Musculoskeletal: Positive for arthralgias  Skin: Negative  Allergic/Immunologic: Negative  Neurological: Negative  Hematological: Negative      Psychiatric/Behavioral: Negative  Objective:      /70 (BP Location: Left arm, Patient Position: Sitting)   Ht 6' 5" (1 956 m)   Wt 121 kg (267 lb)   BMI 31 66 kg/m²          Physical Exam   Constitutional: He is oriented to person, place, and time  He appears well-developed and well-nourished  HENT:   Head: Normocephalic and atraumatic  Eyes: Conjunctivae are normal    Neck: Neck supple  Cardiovascular: Normal rate  Pulmonary/Chest: Effort normal    Abdominal: Soft  Bowel sounds are normal  He exhibits no distension and no mass  There is no tenderness  There is no rebound, no guarding and no CVA tenderness  Hernia confirmed negative in the right inguinal area and confirmed negative in the left inguinal area  No hernia   Genitourinary: Rectum normal, testes normal and penis normal  Prostate is enlarged  Prostate is not tender  Right testis shows no mass  Left testis shows no mass  No phimosis or hypospadias  Genitourinary Comments: Prostate 1-1/2 times enlarged  Firm, smooth and symmetric  Musculoskeletal: He exhibits no edema  Neurological: He is alert and oriented to person, place, and time  Skin: Skin is warm and dry  Psychiatric: He has a normal mood and affect  His behavior is normal  Judgment and thought content normal    Nursing note and vitals reviewed

## 2018-04-04 NOTE — LETTER
April 4, 2018     DO [id number] [employer]    Patient: Hemalatha Starr   YOB: 1950   Date of Visit: 4/4/2018       Dear Dr Jeffie Hodgkin: Thank you for referring Garcia Rangel to me for evaluation  Below are my notes for this consultation  If you have questions, please do not hesitate to call me  I look forward to following your patient along with you  Sincerely,        Zak Carlson MD        CC: No Recipients  Zak Carlson MD  4/4/2018 12:05 PM  Sign at close encounter  Assessment/Plan:    Malignant neoplasm of right kidney, except renal pelvis [facility])  Status post right radical nephrectomy in 2006  Liver profile is normal   Normal left kidney on ultrasound 2015  CXR normal 2014    Benign prostatic hyperplasia with nocturia  AUA symptom score is 12  He is satisfied with his voiding pattern  PSA is 0 3 on March 27, 2018  We will plan to recheck PSA in 1 year  Continue to follow voiding pattern with watchful waiting  Diagnoses and all orders for this visit:    Benign prostatic hyperplasia with nocturia  -     PSA Total, Diagnostic; Future    Malignant neoplasm of right kidney, except renal pelvis (HCC)  -     XR chest pa & lateral; Future          Subjective:      Patient ID: Hemalatha Starr is a 75 y o  male  75year old male followed for lower urinary tract symptoms and a remote history of renal cell carcinoma  He is status post right radical nephrectomy in 2008  He notes he is voiding adequately  His stream is fair and he feels he empties well  There is no gross hematuria, dysuria or symptoms of infection  He does get up 3 times at night to urinate  He notes that he drinks lots of fluids in the evening  He continues to work out 3 times a week  He has no back or bone pain and no constitutional symptoms  He is satisfied with his voiding pattern          The following portions of the patient's history were reviewed and updated as appropriate: allergies, current medications, past family history, past medical history, past social history, past surgical history and problem list     Review of Systems   Constitutional: Negative for chills, diaphoresis, fatigue and fever  HENT: Negative  Eyes: Negative  Respiratory: Negative  Cardiovascular: Negative  Endocrine: Negative  Musculoskeletal: Positive for arthralgias  Skin: Negative  Allergic/Immunologic: Negative  Neurological: Negative  Hematological: Negative  Psychiatric/Behavioral: Negative  Objective:      /70 (BP Location: Left arm, Patient Position: Sitting)   Ht 6' 5" (1 956 m)   Wt 121 kg (267 lb)   BMI 31 66 kg/m²           Physical Exam   Constitutional: He is oriented to person, place, and time  He appears well-developed and well-nourished  HENT:   Head: Normocephalic and atraumatic  Eyes: Conjunctivae are normal    Neck: Neck supple  Cardiovascular: Normal rate  Pulmonary/Chest: Effort normal    Abdominal: Soft  Bowel sounds are normal  He exhibits no distension and no mass  There is no tenderness  There is no rebound, no guarding and no CVA tenderness  Hernia confirmed negative in the right inguinal area and confirmed negative in the left inguinal area  No hernia   Genitourinary: Rectum normal, testes normal and penis normal  Prostate is enlarged  Prostate is not tender  Right testis shows no mass  Left testis shows no mass  No phimosis or hypospadias  Genitourinary Comments: Prostate 1-1/2 times enlarged  Firm, smooth and symmetric  Musculoskeletal: He exhibits no edema  Neurological: He is alert and oriented to person, place, and time  Skin: Skin is warm and dry  Psychiatric: He has a normal mood and affect  His behavior is normal  Judgment and thought content normal    Nursing note and vitals reviewed

## 2018-04-04 NOTE — PROGRESS NOTES
IPSS Questionnaire (AUA-7): Over the past month    1)  How often have you had a sensation of not emptying your bladder completely after you finish urinating? 2 - Less than half the time   2)  How often have you had to urinate again less than two hours after you finished urinating? 1 - Less than 1 time in 5   3)  How often have you found you stopped and started again several times when you urinated? 0 - Not at all   4) How difficult have you found it to postpone urination? 2 - Less than half the time   5) How often have you had a weak urinary stream?  2 - Less than half the time   6) How often have you had to push or strain to begin urination? 2 - Less than half the time   7) How many times did you most typically get up to urinate from the time you went to bed until the time you got up in the morning?   3 - 3 times   Total Score:  12

## 2018-04-04 NOTE — PATIENT INSTRUCTIONS
Benign Prostatic Hypertrophy   WHAT YOU NEED TO KNOW:   Benign prostatic hypertrophy (BPH) is a condition that causes your prostate gland to grow larger than normal  The prostate gland is the male sex gland that produces a fluid that is part of semen  It is about the size of a walnut and it is located under the bladder  As the prostate grows, it can squeeze the urethra  This can block urine flow and cause urinary problems  DISCHARGE INSTRUCTIONS:   Medicines:   · Alpha blockers: This medicine relaxes the muscles in your prostate and bladder  It may help you urinate more easily  · 5 alpha reductase inhibitors: These medicines block the production of a hormone that causes the prostate to get larger  It may help slow the growth of the prostate or shrink the prostate  · Take your medicine as directed  Contact your healthcare provider if you think your medicine is not helping or if you have side effects  Tell him or her if you are allergic to any medicine  Keep a list of the medicines, vitamins, and herbs you take  Include the amounts, and when and why you take them  Bring the list or the pill bottles to follow-up visits  Carry your medicine list with you in case of an emergency  Follow up with your healthcare provider as directed:  Write down your questions so you remember to ask them during your visits  Manage BPH:   · Do not let your bladder get too full before you empty it  Urinate when you feel the urge  · Limit alcohol  Do not drink large amounts of any liquid at one time  · Decrease the amount of salt you eat  Examples of salty foods are chips, cured meats, and canned soups  Do not use table salt  · Healthcare providers may tell you not to eat spicy foods such as chilli peppers  This may help you find out if spicy food makes your BPH symptoms worse  · You may have sex if you feel well  Contact your healthcare provider if:   · There is a large amount of blood in your urine  · Your signs and symptoms get worse  · You have a fever  · You have questions or concerns about your condition or care  Seek care immediately if:   · You are unable to urinate  · Your bladder feels very full and painful  © 2017 [address] Information is for End User's use only and may not be sold, redistributed or otherwise used for commercial purposes  All illustrations and images included in CareNotes® are the copyrighted property of [employer]  or Justen Marshall  The above information is an  only  It is not intended as medical advice for individual conditions or treatments  Talk to your doctor, nurse or pharmacist before following any medical regimen to see if it is safe and effective for you

## 2018-04-04 NOTE — ASSESSMENT & PLAN NOTE
Status post right radical nephrectomy in 2006    Liver profile is normal   Normal left kidney on ultrasound 2015  CXR normal 2014

## 2018-04-04 NOTE — ASSESSMENT & PLAN NOTE
AUA symptom score is 12  He is satisfied with his voiding pattern  PSA is 0 3 on March 27, 2018  We will plan to recheck PSA in 1 year  Continue to follow voiding pattern with watchful waiting

## 2018-04-06 ENCOUNTER — TRANSCRIBE ORDERS (OUTPATIENT)
Dept: ADMINISTRATIVE | Facility: HOSPITAL | Age: 68
End: 2018-04-06

## 2018-04-06 ENCOUNTER — APPOINTMENT (OUTPATIENT)
Dept: RADIOLOGY | Facility: MEDICAL CENTER | Age: 68
End: 2018-04-06
Attending: UROLOGY
Payer: COMMERCIAL

## 2018-04-06 DIAGNOSIS — C64.1 MALIGNANT NEOPLASM OF RIGHT KIDNEY, EXCEPT RENAL PELVIS (HCC): ICD-10-CM

## 2018-04-06 PROCEDURE — 71046 X-RAY EXAM CHEST 2 VIEWS: CPT

## 2018-07-19 ENCOUNTER — TELEPHONE (OUTPATIENT)
Dept: FAMILY MEDICINE CLINIC | Facility: CLINIC | Age: 68
End: 2018-07-19

## 2018-07-19 DIAGNOSIS — I10 ESSENTIAL HYPERTENSION: Primary | ICD-10-CM

## 2018-07-19 RX ORDER — OLMESARTAN MEDOXOMIL 40 MG/1
40 TABLET ORAL DAILY
Qty: 30 TABLET | Refills: 5 | Status: SHIPPED | OUTPATIENT
Start: 2018-07-19 | End: 2018-07-24

## 2018-07-19 NOTE — TELEPHONE ENCOUNTER
Jossy Castillo called in and said that valsartan has been recalled by the fda and wants to know what he should do about his bp meds   Please advise

## 2018-07-24 ENCOUNTER — TELEPHONE (OUTPATIENT)
Dept: FAMILY MEDICINE CLINIC | Facility: CLINIC | Age: 68
End: 2018-07-24

## 2018-07-24 DIAGNOSIS — I10 ESSENTIAL HYPERTENSION: Primary | ICD-10-CM

## 2018-07-24 RX ORDER — IRBESARTAN 150 MG/1
150 TABLET ORAL
Qty: 30 TABLET | Refills: 5 | Status: SHIPPED | OUTPATIENT
Start: 2018-07-24 | End: 2018-10-05 | Stop reason: SDUPTHER

## 2018-07-24 NOTE — TELEPHONE ENCOUNTER
Pt is requesting a new med  He was switched to Benicar which is very expensive for him  It is a level 4 with his insurance  Pt is requesting a script for Nasima Shaikh, which is a level 1 for him and less expensive    Thank you    Pharmacy- [facility] on [address]

## 2018-09-28 ENCOUNTER — APPOINTMENT (OUTPATIENT)
Dept: LAB | Facility: CLINIC | Age: 68
End: 2018-09-28
Payer: COMMERCIAL

## 2018-09-28 DIAGNOSIS — R94.6 THYROID FUNCTION STUDY ABNORMALITY: ICD-10-CM

## 2018-09-28 DIAGNOSIS — I10 ESSENTIAL HYPERTENSION: ICD-10-CM

## 2018-09-28 DIAGNOSIS — E78.5 HYPERLIPIDEMIA, UNSPECIFIED HYPERLIPIDEMIA TYPE: ICD-10-CM

## 2018-09-28 DIAGNOSIS — R73.9 HYPERGLYCEMIA: ICD-10-CM

## 2018-09-28 DIAGNOSIS — N18.30 CHRONIC KIDNEY DISEASE, STAGE 3 (HCC): ICD-10-CM

## 2018-09-28 LAB
ALBUMIN SERPL BCP-MCNC: 3.7 G/DL (ref 3.5–5)
ALP SERPL-CCNC: 74 U/L (ref 46–116)
ALT SERPL W P-5'-P-CCNC: 23 U/L (ref 12–78)
ANION GAP SERPL CALCULATED.3IONS-SCNC: 5 MMOL/L (ref 4–13)
AST SERPL W P-5'-P-CCNC: 16 U/L (ref 5–45)
BILIRUB SERPL-MCNC: 0.92 MG/DL (ref 0.2–1)
BILIRUB UR QL STRIP: NEGATIVE
BUN SERPL-MCNC: 33 MG/DL (ref 5–25)
CALCIUM SERPL-MCNC: 9.2 MG/DL (ref 8.3–10.1)
CHLORIDE SERPL-SCNC: 107 MMOL/L (ref 100–108)
CHOLEST SERPL-MCNC: 181 MG/DL (ref 50–200)
CLARITY UR: CLEAR
CO2 SERPL-SCNC: 29 MMOL/L (ref 21–32)
COLOR UR: YELLOW
CREAT SERPL-MCNC: 1.5 MG/DL (ref 0.6–1.3)
GFR SERPL CREATININE-BSD FRML MDRD: 47 ML/MIN/1.73SQ M
GLUCOSE P FAST SERPL-MCNC: 105 MG/DL (ref 65–99)
GLUCOSE UR STRIP-MCNC: NEGATIVE MG/DL
HDLC SERPL-MCNC: 36 MG/DL (ref 40–60)
HGB UR QL STRIP.AUTO: NEGATIVE
KETONES UR STRIP-MCNC: NEGATIVE MG/DL
LDLC SERPL CALC-MCNC: 108 MG/DL (ref 0–100)
LEUKOCYTE ESTERASE UR QL STRIP: NEGATIVE
NITRITE UR QL STRIP: NEGATIVE
PH UR STRIP.AUTO: 6 [PH] (ref 4.5–8)
POTASSIUM SERPL-SCNC: 4.1 MMOL/L (ref 3.5–5.3)
PROT SERPL-MCNC: 6.9 G/DL (ref 6.4–8.2)
PROT UR STRIP-MCNC: NEGATIVE MG/DL
SODIUM SERPL-SCNC: 141 MMOL/L (ref 136–145)
SP GR UR STRIP.AUTO: 1.02 (ref 1–1.03)
T4 FREE SERPL-MCNC: 0.89 NG/DL (ref 0.76–1.46)
TRIGL SERPL-MCNC: 183 MG/DL
TSH SERPL DL<=0.05 MIU/L-ACNC: 4.02 UIU/ML (ref 0.36–3.74)
UROBILINOGEN UR QL STRIP.AUTO: 0.2 E.U./DL

## 2018-09-28 PROCEDURE — 84439 ASSAY OF FREE THYROXINE: CPT

## 2018-09-28 PROCEDURE — 84443 ASSAY THYROID STIM HORMONE: CPT

## 2018-09-28 PROCEDURE — 80053 COMPREHEN METABOLIC PANEL: CPT

## 2018-09-28 PROCEDURE — 36415 COLL VENOUS BLD VENIPUNCTURE: CPT

## 2018-09-28 PROCEDURE — 81003 URINALYSIS AUTO W/O SCOPE: CPT

## 2018-09-28 PROCEDURE — 80061 LIPID PANEL: CPT

## 2018-10-05 ENCOUNTER — OFFICE VISIT (OUTPATIENT)
Dept: FAMILY MEDICINE CLINIC | Facility: CLINIC | Age: 68
End: 2018-10-05
Payer: COMMERCIAL

## 2018-10-05 VITALS
SYSTOLIC BLOOD PRESSURE: 132 MMHG | HEART RATE: 68 BPM | WEIGHT: 269.8 LBS | BODY MASS INDEX: 31.99 KG/M2 | DIASTOLIC BLOOD PRESSURE: 80 MMHG

## 2018-10-05 DIAGNOSIS — N40.1 BENIGN PROSTATIC HYPERPLASIA WITH NOCTURIA: ICD-10-CM

## 2018-10-05 DIAGNOSIS — E78.5 HYPERLIPIDEMIA, UNSPECIFIED HYPERLIPIDEMIA TYPE: ICD-10-CM

## 2018-10-05 DIAGNOSIS — K63.5 POLYP OF COLON, UNSPECIFIED PART OF COLON, UNSPECIFIED TYPE: ICD-10-CM

## 2018-10-05 DIAGNOSIS — C64.1 MALIGNANT NEOPLASM OF RIGHT KIDNEY, EXCEPT RENAL PELVIS (HCC): ICD-10-CM

## 2018-10-05 DIAGNOSIS — I10 ESSENTIAL HYPERTENSION: Primary | ICD-10-CM

## 2018-10-05 DIAGNOSIS — R73.9 HYPERGLYCEMIA: ICD-10-CM

## 2018-10-05 DIAGNOSIS — N18.30 CHRONIC KIDNEY DISEASE, STAGE 3 (HCC): ICD-10-CM

## 2018-10-05 DIAGNOSIS — M15.9 OSTEOARTHRITIS OF MULTIPLE JOINTS, UNSPECIFIED OSTEOARTHRITIS TYPE: ICD-10-CM

## 2018-10-05 DIAGNOSIS — R94.6 THYROID FUNCTION STUDY ABNORMALITY: ICD-10-CM

## 2018-10-05 DIAGNOSIS — R35.1 BENIGN PROSTATIC HYPERPLASIA WITH NOCTURIA: ICD-10-CM

## 2018-10-05 DIAGNOSIS — J30.9 ALLERGIC RHINITIS, UNSPECIFIED SEASONALITY, UNSPECIFIED TRIGGER: ICD-10-CM

## 2018-10-05 DIAGNOSIS — Z23 NEED FOR INFLUENZA VACCINATION: ICD-10-CM

## 2018-10-05 DIAGNOSIS — Z00.00 HEALTH CARE MAINTENANCE: ICD-10-CM

## 2018-10-05 DIAGNOSIS — H93.19 TINNITUS, UNSPECIFIED LATERALITY: ICD-10-CM

## 2018-10-05 PROCEDURE — 3075F SYST BP GE 130 - 139MM HG: CPT | Performed by: FAMILY MEDICINE

## 2018-10-05 PROCEDURE — 1036F TOBACCO NON-USER: CPT | Performed by: FAMILY MEDICINE

## 2018-10-05 PROCEDURE — 1160F RVW MEDS BY RX/DR IN RCRD: CPT | Performed by: FAMILY MEDICINE

## 2018-10-05 PROCEDURE — 90662 IIV NO PRSV INCREASED AG IM: CPT

## 2018-10-05 PROCEDURE — G0008 ADMIN INFLUENZA VIRUS VAC: HCPCS

## 2018-10-05 PROCEDURE — 99214 OFFICE O/P EST MOD 30 MIN: CPT | Performed by: FAMILY MEDICINE

## 2018-10-05 PROCEDURE — 3725F SCREEN DEPRESSION PERFORMED: CPT

## 2018-10-05 PROCEDURE — 3079F DIAST BP 80-89 MM HG: CPT | Performed by: FAMILY MEDICINE

## 2018-10-05 PROCEDURE — 1101F PT FALLS ASSESS-DOCD LE1/YR: CPT | Performed by: FAMILY MEDICINE

## 2018-10-05 PROCEDURE — 1160F RVW MEDS BY RX/DR IN RCRD: CPT

## 2018-10-05 PROCEDURE — 4040F PNEUMOC VAC/ADMIN/RCVD: CPT

## 2018-10-05 RX ORDER — IRBESARTAN 150 MG/1
150 TABLET ORAL
Qty: 90 TABLET | Refills: 3 | Status: SHIPPED | OUTPATIENT
Start: 2018-10-05 | End: 2019-10-17 | Stop reason: SDUPTHER

## 2018-10-05 NOTE — PROGRESS NOTES
Assessment/Plan:  1  Hypertension, stable continue present therapy  2  Hyperlipidemia diet controlled  3  Hyperglycemia diet controlled  4  Malignant right kidney/right nephrectomy/BPH, patient follows with Urology  5  Chronic kidney disease -3 secondary to above  GFR stable  Patient has seen Nephrology in the past  6  Tinnitus, mild intermittent, patient declines ENT evaluation  7  Colon polyp patient's last colonoscopy 2013  Refer back to patient's colorectal specialist  8  Allergic rhinitis, asymptomatic at the present time  9  DJD, stable  10  Health care maintenance, high-dose Fluzone given today  11  Patient to return in 6 months for office visit blood work, sooner if needed        Health care maintenance  High-dose Fluzone given today    Colon polyp  Refer back to colorectal specialist patient is due for screening colonoscopy    Allergic rhinitis  Asymptomatic at the present time    Hypertension  Stable refill of Avapro was given    Osteoarthritis  Stable no medications a used    Malignant neoplasm of right kidney, except renal pelvis ([address] )  Follows with Urology    Chronic kidney disease, stage 3 ([address] )  Stable continue to monitor patient has seen Nephrology    Benign prostatic hyperplasia with nocturia  Follows with Urology    Thyroid function study abnormality  Stable continue to monitor    Hyperlipidemia  Diet controlled    Hyperglycemia  Diet controlled    Tinnitus  Offered ENT evaluation patient declined it is not that bad       Diagnoses and all orders for this visit:    Essential hypertension  -     irbesartan (AVAPRO) 150 mg tablet; Take 1 tablet (150 mg total) by mouth daily at bedtime  -     CBC; Future  -     Comprehensive metabolic panel; Future  -     Hemoglobin A1C; Future  -     Lipid Panel with Direct LDL reflex; Future  -     TSH, 3rd generation with Free T4 reflex; Future  -     T4; Future  -     Urinalysis with microscopic;  Future    Need for influenza vaccination  - influenza vaccine, 5028-2029, high-dose, PF 0 5 mL, for patients 65 yr+ (FLUZONE HIGH-DOSE)    Polyp of colon, unspecified part of colon, unspecified type  -     CBC; Future  -     Comprehensive metabolic panel; Future  -     Hemoglobin A1C; Future  -     Lipid Panel with Direct LDL reflex; Future  -     TSH, 3rd generation with Free T4 reflex; Future  -     T4; Future  -     Urinalysis with microscopic; Future  -     Ambulatory referral to Colorectal Surgery; Future    Osteoarthritis of multiple joints, unspecified osteoarthritis type  -     CBC; Future  -     Comprehensive metabolic panel; Future  -     Hemoglobin A1C; Future  -     Lipid Panel with Direct LDL reflex; Future  -     TSH, 3rd generation with Free T4 reflex; Future  -     T4; Future  -     Urinalysis with microscopic; Future    Malignant neoplasm of right kidney, except renal pelvis (HCC)  -     CBC; Future  -     Comprehensive metabolic panel; Future  -     Hemoglobin A1C; Future  -     Lipid Panel with Direct LDL reflex; Future  -     TSH, 3rd generation with Free T4 reflex; Future  -     T4; Future  -     Urinalysis with microscopic; Future    Chronic kidney disease, stage 3 (HCC)  -     CBC; Future  -     Comprehensive metabolic panel; Future  -     Hemoglobin A1C; Future  -     Lipid Panel with Direct LDL reflex; Future  -     TSH, 3rd generation with Free T4 reflex; Future  -     T4; Future  -     Urinalysis with microscopic; Future    Benign prostatic hyperplasia with nocturia  -     CBC; Future  -     Comprehensive metabolic panel; Future  -     Hemoglobin A1C; Future  -     Lipid Panel with Direct LDL reflex; Future  -     TSH, 3rd generation with Free T4 reflex; Future  -     T4; Future  -     Urinalysis with microscopic; Future    Thyroid function study abnormality  -     CBC; Future  -     Comprehensive metabolic panel; Future  -     Hemoglobin A1C; Future  -     Lipid Panel with Direct LDL reflex;  Future  -     TSH, 3rd generation with Free T4 reflex; Future  -     T4; Future  -     Urinalysis with microscopic; Future    Hyperlipidemia, unspecified hyperlipidemia type  -     CBC; Future  -     Comprehensive metabolic panel; Future  -     Hemoglobin A1C; Future  -     Lipid Panel with Direct LDL reflex; Future  -     TSH, 3rd generation with Free T4 reflex; Future  -     T4; Future  -     Urinalysis with microscopic; Future    Hyperglycemia  -     CBC; Future  -     Comprehensive metabolic panel; Future  -     Hemoglobin A1C; Future  -     Lipid Panel with Direct LDL reflex; Future  -     TSH, 3rd generation with Free T4 reflex; Future  -     T4; Future  -     Urinalysis with microscopic; Future    Health care maintenance  -     CBC; Future  -     Comprehensive metabolic panel; Future  -     Hemoglobin A1C; Future  -     Lipid Panel with Direct LDL reflex; Future  -     TSH, 3rd generation with Free T4 reflex; Future  -     T4; Future  -     Urinalysis with microscopic; Future    Allergic rhinitis, unspecified seasonality, unspecified trigger  -     CBC; Future  -     Comprehensive metabolic panel; Future  -     Hemoglobin A1C; Future  -     Lipid Panel with Direct LDL reflex; Future  -     TSH, 3rd generation with Free T4 reflex; Future  -     T4; Future  -     Urinalysis with microscopic; Future    Tinnitus, unspecified laterality          Subjective:   CC: 6 month follow up for chronic conditions and to review blood work  [facility]   Patient ID: Nadine Meyers is a 75 y o  male  Patient doing well he complains of occasional ringing in ears could not bed  Offered ENT consultation patient declined  Blood work was discussed with the patient  High-dose Fluzone given today        The following portions of the patient's history were reviewed and updated as appropriate: allergies, current medications, past family history, past medical history, past social history, past surgical history and problem list     Review of Systems   Constitutional: Negative  HENT:        HPI   Eyes: Negative  Respiratory: Negative  Cardiovascular: Negative  Gastrointestinal: Negative  Endocrine: Negative  Genitourinary: Negative  Musculoskeletal: Negative  Skin: Negative  Allergic/Immunologic: Positive for environmental allergies  Neurological:        HPI   Hematological: Negative  Psychiatric/Behavioral: Negative  Objective:      Vitals:    10/05/18 0849 10/05/18 0905   BP: 138/88 132/80   BP Location: Left arm    Patient Position: Sitting    Pulse: 68    Weight: 122 kg (269 lb 12 8 oz)             Physical Exam   Constitutional: He appears well-developed and well-nourished  HENT:   Head: Normocephalic and atraumatic  Left Ear: External ear normal    Mouth/Throat: Oropharynx is clear and moist  No oropharyngeal exudate  Mildly allergic turbinates   Eyes: Pupils are equal, round, and reactive to light  Conjunctivae and EOM are normal  No scleral icterus  Neck: Neck supple  No thyromegaly present  Cardiovascular: Normal rate, regular rhythm and normal heart sounds  Pulmonary/Chest: Effort normal    Musculoskeletal: He exhibits no edema  Lymphadenopathy:     He has no cervical adenopathy  Neurological: No cranial nerve deficit  Skin: Skin is warm and dry  Psychiatric: He has a normal mood and affect

## 2018-10-05 NOTE — PATIENT INSTRUCTIONS
Patient return in 6 months for office visit blood work    Patient follow up with colorectal specialist, Dr Cecy Kohler, patient is due for follow-up colonoscopy for colon polyp

## 2019-03-26 ENCOUNTER — APPOINTMENT (OUTPATIENT)
Dept: LAB | Facility: CLINIC | Age: 69
End: 2019-03-26
Payer: COMMERCIAL

## 2019-03-26 DIAGNOSIS — R73.9 HYPERGLYCEMIA: ICD-10-CM

## 2019-03-26 DIAGNOSIS — N18.30 CHRONIC KIDNEY DISEASE, STAGE 3 (HCC): ICD-10-CM

## 2019-03-26 DIAGNOSIS — M15.9 OSTEOARTHRITIS OF MULTIPLE JOINTS, UNSPECIFIED OSTEOARTHRITIS TYPE: ICD-10-CM

## 2019-03-26 DIAGNOSIS — K63.5 POLYP OF COLON, UNSPECIFIED PART OF COLON, UNSPECIFIED TYPE: ICD-10-CM

## 2019-03-26 DIAGNOSIS — Z00.00 HEALTH CARE MAINTENANCE: ICD-10-CM

## 2019-03-26 DIAGNOSIS — R35.1 BENIGN PROSTATIC HYPERPLASIA WITH NOCTURIA: ICD-10-CM

## 2019-03-26 DIAGNOSIS — R94.6 THYROID FUNCTION STUDY ABNORMALITY: ICD-10-CM

## 2019-03-26 DIAGNOSIS — N40.1 BENIGN PROSTATIC HYPERPLASIA WITH NOCTURIA: ICD-10-CM

## 2019-03-26 DIAGNOSIS — C64.1 MALIGNANT NEOPLASM OF RIGHT KIDNEY, EXCEPT RENAL PELVIS (HCC): ICD-10-CM

## 2019-03-26 DIAGNOSIS — I10 ESSENTIAL HYPERTENSION: ICD-10-CM

## 2019-03-26 DIAGNOSIS — J30.9 ALLERGIC RHINITIS, UNSPECIFIED SEASONALITY, UNSPECIFIED TRIGGER: ICD-10-CM

## 2019-03-26 DIAGNOSIS — E78.5 HYPERLIPIDEMIA, UNSPECIFIED HYPERLIPIDEMIA TYPE: ICD-10-CM

## 2019-03-26 LAB
ALBUMIN SERPL BCP-MCNC: 4 G/DL (ref 3.5–5)
ALP SERPL-CCNC: 85 U/L (ref 46–116)
ALT SERPL W P-5'-P-CCNC: 20 U/L (ref 12–78)
ANION GAP SERPL CALCULATED.3IONS-SCNC: 4 MMOL/L (ref 4–13)
AST SERPL W P-5'-P-CCNC: 14 U/L (ref 5–45)
BACTERIA UR QL AUTO: NORMAL /HPF
BILIRUB SERPL-MCNC: 1.29 MG/DL (ref 0.2–1)
BILIRUB UR QL STRIP: NEGATIVE
BUN SERPL-MCNC: 25 MG/DL (ref 5–25)
CALCIUM SERPL-MCNC: 9.4 MG/DL (ref 8.3–10.1)
CHLORIDE SERPL-SCNC: 106 MMOL/L (ref 100–108)
CHOLEST SERPL-MCNC: 174 MG/DL (ref 50–200)
CLARITY UR: CLEAR
CO2 SERPL-SCNC: 29 MMOL/L (ref 21–32)
COLOR UR: YELLOW
CREAT SERPL-MCNC: 1.49 MG/DL (ref 0.6–1.3)
ERYTHROCYTE [DISTWIDTH] IN BLOOD BY AUTOMATED COUNT: 12 % (ref 11.6–15.1)
EST. AVERAGE GLUCOSE BLD GHB EST-MCNC: 114 MG/DL
GFR SERPL CREATININE-BSD FRML MDRD: 47 ML/MIN/1.73SQ M
GLUCOSE P FAST SERPL-MCNC: 102 MG/DL (ref 65–99)
GLUCOSE UR STRIP-MCNC: NEGATIVE MG/DL
HBA1C MFR BLD: 5.6 % (ref 4.2–6.3)
HCT VFR BLD AUTO: 44.5 % (ref 36.5–49.3)
HDLC SERPL-MCNC: 33 MG/DL (ref 40–60)
HGB BLD-MCNC: 15 G/DL (ref 12–17)
HGB UR QL STRIP.AUTO: NEGATIVE
HYALINE CASTS #/AREA URNS LPF: NORMAL /LPF
KETONES UR STRIP-MCNC: NEGATIVE MG/DL
LDLC SERPL CALC-MCNC: 98 MG/DL (ref 0–100)
LEUKOCYTE ESTERASE UR QL STRIP: NEGATIVE
MCH RBC QN AUTO: 30.9 PG (ref 26.8–34.3)
MCHC RBC AUTO-ENTMCNC: 33.7 G/DL (ref 31.4–37.4)
MCV RBC AUTO: 92 FL (ref 82–98)
NITRITE UR QL STRIP: NEGATIVE
NON-SQ EPI CELLS URNS QL MICRO: NORMAL /HPF
PH UR STRIP.AUTO: 7 [PH]
PLATELET # BLD AUTO: 311 THOUSANDS/UL (ref 149–390)
PMV BLD AUTO: 9.4 FL (ref 8.9–12.7)
POTASSIUM SERPL-SCNC: 4.1 MMOL/L (ref 3.5–5.3)
PROT SERPL-MCNC: 7 G/DL (ref 6.4–8.2)
PROT UR STRIP-MCNC: NEGATIVE MG/DL
PSA SERPL-MCNC: 0.2 NG/ML (ref 0–4)
RBC # BLD AUTO: 4.85 MILLION/UL (ref 3.88–5.62)
RBC #/AREA URNS AUTO: NORMAL /HPF
SODIUM SERPL-SCNC: 139 MMOL/L (ref 136–145)
SP GR UR STRIP.AUTO: 1.02 (ref 1–1.03)
T4 SERPL-MCNC: 8.3 UG/DL (ref 4.7–13.3)
TRIGL SERPL-MCNC: 216 MG/DL
TSH SERPL DL<=0.05 MIU/L-ACNC: 3.71 UIU/ML (ref 0.36–3.74)
UROBILINOGEN UR QL STRIP.AUTO: 0.2 E.U./DL
WBC # BLD AUTO: 9.2 THOUSAND/UL (ref 4.31–10.16)
WBC #/AREA URNS AUTO: NORMAL /HPF

## 2019-03-26 PROCEDURE — 85027 COMPLETE CBC AUTOMATED: CPT

## 2019-03-26 PROCEDURE — 36415 COLL VENOUS BLD VENIPUNCTURE: CPT

## 2019-03-26 PROCEDURE — 83036 HEMOGLOBIN GLYCOSYLATED A1C: CPT

## 2019-03-26 PROCEDURE — 80053 COMPREHEN METABOLIC PANEL: CPT

## 2019-03-26 PROCEDURE — 84443 ASSAY THYROID STIM HORMONE: CPT

## 2019-03-26 PROCEDURE — 84153 ASSAY OF PSA TOTAL: CPT

## 2019-03-26 PROCEDURE — 84436 ASSAY OF TOTAL THYROXINE: CPT

## 2019-03-26 PROCEDURE — 80061 LIPID PANEL: CPT

## 2019-03-26 PROCEDURE — 81001 URINALYSIS AUTO W/SCOPE: CPT

## 2019-04-02 ENCOUNTER — OFFICE VISIT (OUTPATIENT)
Dept: UROLOGY | Facility: MEDICAL CENTER | Age: 69
End: 2019-04-02
Payer: COMMERCIAL

## 2019-04-02 VITALS
HEIGHT: 77 IN | SYSTOLIC BLOOD PRESSURE: 130 MMHG | BODY MASS INDEX: 30.7 KG/M2 | HEART RATE: 56 BPM | DIASTOLIC BLOOD PRESSURE: 82 MMHG | WEIGHT: 260 LBS

## 2019-04-02 DIAGNOSIS — R35.1 BENIGN PROSTATIC HYPERPLASIA WITH NOCTURIA: Primary | ICD-10-CM

## 2019-04-02 DIAGNOSIS — Z85.528 PERSONAL HISTORY OF KIDNEY CANCER: ICD-10-CM

## 2019-04-02 DIAGNOSIS — N40.1 BENIGN PROSTATIC HYPERPLASIA WITH NOCTURIA: Primary | ICD-10-CM

## 2019-04-02 LAB
SL AMB  POCT GLUCOSE, UA: NORMAL
SL AMB LEUKOCYTE ESTERASE,UA: NORMAL
SL AMB POCT BILIRUBIN,UA: NORMAL
SL AMB POCT BLOOD,UA: NORMAL
SL AMB POCT CLARITY,UA: CLEAR
SL AMB POCT COLOR,UA: YELLOW
SL AMB POCT KETONES,UA: NORMAL
SL AMB POCT NITRITE,UA: NORMAL
SL AMB POCT PH,UA: 6
SL AMB POCT SPECIFIC GRAVITY,UA: 1.02
SL AMB POCT URINE PROTEIN: NORMAL
SL AMB POCT UROBILINOGEN: 0.2

## 2019-04-02 PROCEDURE — 81003 URINALYSIS AUTO W/O SCOPE: CPT | Performed by: UROLOGY

## 2019-04-02 PROCEDURE — 99214 OFFICE O/P EST MOD 30 MIN: CPT | Performed by: UROLOGY

## 2019-04-02 RX ORDER — RANITIDINE HCL 75 MG
TABLET ORAL
COMMUNITY
End: 2020-06-17 | Stop reason: ALTCHOICE

## 2019-04-12 ENCOUNTER — OFFICE VISIT (OUTPATIENT)
Dept: FAMILY MEDICINE CLINIC | Facility: CLINIC | Age: 69
End: 2019-04-12
Payer: COMMERCIAL

## 2019-04-12 VITALS
WEIGHT: 269.2 LBS | HEART RATE: 64 BPM | SYSTOLIC BLOOD PRESSURE: 128 MMHG | HEIGHT: 77 IN | BODY MASS INDEX: 31.79 KG/M2 | DIASTOLIC BLOOD PRESSURE: 74 MMHG

## 2019-04-12 DIAGNOSIS — R73.9 HYPERGLYCEMIA: ICD-10-CM

## 2019-04-12 DIAGNOSIS — N18.30 CHRONIC KIDNEY DISEASE, STAGE 3 (HCC): ICD-10-CM

## 2019-04-12 DIAGNOSIS — E78.5 HYPERLIPIDEMIA, UNSPECIFIED HYPERLIPIDEMIA TYPE: ICD-10-CM

## 2019-04-12 DIAGNOSIS — Z00.00 HEALTH CARE MAINTENANCE: ICD-10-CM

## 2019-04-12 DIAGNOSIS — R94.6 THYROID FUNCTION STUDY ABNORMALITY: ICD-10-CM

## 2019-04-12 DIAGNOSIS — I10 ESSENTIAL HYPERTENSION: Primary | ICD-10-CM

## 2019-04-12 DIAGNOSIS — C64.1 MALIGNANT NEOPLASM OF RIGHT KIDNEY, EXCEPT RENAL PELVIS (HCC): ICD-10-CM

## 2019-04-12 DIAGNOSIS — M15.9 OSTEOARTHRITIS OF MULTIPLE JOINTS, UNSPECIFIED OSTEOARTHRITIS TYPE: ICD-10-CM

## 2019-04-12 DIAGNOSIS — G89.29 CHRONIC PAIN OF LEFT KNEE: ICD-10-CM

## 2019-04-12 DIAGNOSIS — M25.562 CHRONIC PAIN OF LEFT KNEE: ICD-10-CM

## 2019-04-12 DIAGNOSIS — E80.4 GILBERT'S SYNDROME: ICD-10-CM

## 2019-04-12 PROCEDURE — 99214 OFFICE O/P EST MOD 30 MIN: CPT | Performed by: FAMILY MEDICINE

## 2019-04-12 PROCEDURE — 1125F AMNT PAIN NOTED PAIN PRSNT: CPT | Performed by: FAMILY MEDICINE

## 2019-04-12 PROCEDURE — G0439 PPPS, SUBSEQ VISIT: HCPCS | Performed by: FAMILY MEDICINE

## 2019-04-12 PROCEDURE — 1170F FXNL STATUS ASSESSED: CPT | Performed by: FAMILY MEDICINE

## 2019-04-12 PROCEDURE — 3074F SYST BP LT 130 MM HG: CPT | Performed by: FAMILY MEDICINE

## 2019-04-12 PROCEDURE — 1036F TOBACCO NON-USER: CPT | Performed by: FAMILY MEDICINE

## 2019-04-12 PROCEDURE — 3078F DIAST BP <80 MM HG: CPT | Performed by: FAMILY MEDICINE

## 2019-08-17 ENCOUNTER — OFFICE VISIT (OUTPATIENT)
Dept: FAMILY MEDICINE CLINIC | Facility: CLINIC | Age: 69
End: 2019-08-17
Payer: COMMERCIAL

## 2019-08-17 VITALS
TEMPERATURE: 98.2 F | WEIGHT: 258.6 LBS | HEART RATE: 80 BPM | BODY MASS INDEX: 30.53 KG/M2 | DIASTOLIC BLOOD PRESSURE: 80 MMHG | SYSTOLIC BLOOD PRESSURE: 124 MMHG | HEIGHT: 77 IN

## 2019-08-17 DIAGNOSIS — L03.115 CELLULITIS OF RIGHT LOWER EXTREMITY: ICD-10-CM

## 2019-08-17 DIAGNOSIS — S80.861A INSECT BITE OF RIGHT LOWER LEG, INITIAL ENCOUNTER: Primary | ICD-10-CM
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
## 2019-08-17 DIAGNOSIS — I10 ESSENTIAL HYPERTENSION: ICD-10-CM

## 2019-08-17 PROCEDURE — 1036F TOBACCO NON-USER: CPT | Performed by: FAMILY MEDICINE

## 2019-08-17 PROCEDURE — 99214 OFFICE O/P EST MOD 30 MIN: CPT | Performed by: FAMILY MEDICINE

## 2019-08-17 PROCEDURE — 3074F SYST BP LT 130 MM HG: CPT | Performed by: FAMILY MEDICINE

## 2019-08-17 PROCEDURE — 1160F RVW MEDS BY RX/DR IN RCRD: CPT | Performed by: FAMILY MEDICINE

## 2019-08-17 PROCEDURE — 3008F BODY MASS INDEX DOCD: CPT | Performed by: FAMILY MEDICINE

## 2019-08-17 RX ORDER — CEPHALEXIN 500 MG/1
CAPSULE ORAL
Qty: 21 CAPSULE | Refills: 0 | Status: SHIPPED | OUTPATIENT
Start: 2019-08-17 | End: 2019-08-24

## 2019-08-17 RX ORDER — BETAMETHASONE DIPROPIONATE 0.5 MG/G
CREAM TOPICAL 2 TIMES DAILY
Qty: 30 G | Refills: 0 | Status: SHIPPED | OUTPATIENT
Start: 2019-08-17 | End: 2019-10-22 | Stop reason: ALTCHOICE

## 2019-08-17 NOTE — PATIENT INSTRUCTIONS
Call if not improved in 2-3 days  Return in 1 week if still symptoms    If worsen report to [facility] Emergency Department

## 2019-08-17 NOTE — PROGRESS NOTES
Assessment and Plan:  1  Insect bite 2  Cellulitis, patient is up-to-date with tetanus he had an Adacel 2017  Keflex was prescribed betamethasone cream prescribed  Call if not better in 48 hours  Return in 1 week if still symptoms  If worsen over the weekend report to [facility] Emergency Department  3  Hypertension, stable continue present therapy    Problem List Items Addressed This Visit        Cardiovascular and Mediastinum    Hypertension      Stable continue present therapy           Other Visit Diagnoses     Insect bite of right lower leg, initial encounter    -  Primary    Relevant Medications    cephalexin (KEFLEX) 500 mg capsule    betamethasone dipropionate (DIPROSONE) 0 05 % cream    Cellulitis of right lower extremity        Relevant Medications    cephalexin (KEFLEX) 500 mg capsule    betamethasone dipropionate (DIPROSONE) 0 05 % cream                 Diagnoses and all orders for this visit:    Insect bite of right lower leg, initial encounter  -     cephalexin (KEFLEX) 500 mg capsule; Take 1 capsule 3 times daily for 1 week  -     betamethasone dipropionate (DIPROSONE) 0 05 % cream; Apply topically 2 (two) times a day    Cellulitis of right lower extremity  -     cephalexin (KEFLEX) 500 mg capsule; Take 1 capsule 3 times daily for 1 week  -     betamethasone dipropionate (DIPROSONE) 0 05 % cream; Apply topically 2 (two) times a day    Essential hypertension              Subjective:      Patient ID: Olivia Washington is a 70 y o  male  CC:    Chief Complaint   Patient presents with    Cellulitis     Pt c/o cellulitis on right leg  HPI:     Patient's felt like he had an insect bite a few days  On his lower right leg    Now area has irregular erythematous patch approximately 3 in x 4 in negative central clearing negative ECM patient denies fever chills myalgias or arthralgias      The following portions of the patient's history were reviewed and updated as appropriate: allergies, current medications, past family history, past medical history, past social history, past surgical history and problem list       Review of Systems   Constitutional: Negative for chills and fever  HENT: Negative  Eyes: Negative  Respiratory: Negative  Cardiovascular: Negative  Gastrointestinal: Negative  Endocrine: Negative  Genitourinary: Negative  Musculoskeletal:         HPI   Skin:         HPI   Allergic/Immunologic: Negative  Negative for environmental allergies  Neurological: Negative  Hematological: Negative  Psychiatric/Behavioral: Negative  Data to review:       Objective:    Vitals:    08/17/19 1049 08/17/19 1054   BP: 158/98 124/80   Pulse: 80    Temp: 98 2 °F (36 8 °C)    Weight: 117 kg (258 lb 9 6 oz)    Height: 6' 5" (1 956 m)         Physical Exam   Constitutional: He is oriented to person, place, and time  He appears well-developed and well-nourished  HENT:   Head: Normocephalic and atraumatic  Mouth/Throat: Oropharynx is clear and moist    Eyes: Pupils are equal, round, and reactive to light  Conjunctivae and EOM are normal  No scleral icterus  Neck: Neck supple  No JVD present  Cardiovascular: Normal rate, regular rhythm and normal heart sounds  Pulmonary/Chest: Effort normal and breath sounds normal    Musculoskeletal: He exhibits no edema or tenderness  Neurological: He is alert and oriented to person, place, and time  No cranial nerve deficit  Skin: There is erythema  Right distal lateral calf with erythematous patch approximately 3 in x 4 in  Negative ECM negative central clearing negative open areas   Psychiatric: He has a normal mood and affect

## 2019-10-17 ENCOUNTER — APPOINTMENT (OUTPATIENT)
Dept: LAB | Facility: CLINIC | Age: 69
End: 2019-10-17
Payer: COMMERCIAL

## 2019-10-17 DIAGNOSIS — R94.6 THYROID FUNCTION STUDY ABNORMALITY: ICD-10-CM

## 2019-10-17 DIAGNOSIS — E78.5 HYPERLIPIDEMIA, UNSPECIFIED HYPERLIPIDEMIA TYPE: ICD-10-CM

## 2019-10-17 DIAGNOSIS — E80.4 GILBERT'S SYNDROME: ICD-10-CM

## 2019-10-17 DIAGNOSIS — C64.1 MALIGNANT NEOPLASM OF RIGHT KIDNEY, EXCEPT RENAL PELVIS (HCC): ICD-10-CM

## 2019-10-17 DIAGNOSIS — M15.9 OSTEOARTHRITIS OF MULTIPLE JOINTS, UNSPECIFIED OSTEOARTHRITIS TYPE: ICD-10-CM

## 2019-10-17 DIAGNOSIS — I10 ESSENTIAL HYPERTENSION: ICD-10-CM

## 2019-10-17 DIAGNOSIS — G89.29 CHRONIC PAIN OF LEFT KNEE: ICD-10-CM

## 2019-10-17 DIAGNOSIS — M25.562 CHRONIC PAIN OF LEFT KNEE: ICD-10-CM

## 2019-10-17 DIAGNOSIS — N18.30 CHRONIC KIDNEY DISEASE, STAGE 3 (HCC): ICD-10-CM

## 2019-10-17 DIAGNOSIS — R73.9 HYPERGLYCEMIA: ICD-10-CM

## 2019-10-17 LAB
ALBUMIN SERPL BCP-MCNC: 4.2 G/DL (ref 3.5–5)
ALP SERPL-CCNC: 84 U/L (ref 46–116)
ALT SERPL W P-5'-P-CCNC: 23 U/L (ref 12–78)
ANION GAP SERPL CALCULATED.3IONS-SCNC: 6 MMOL/L (ref 4–13)
AST SERPL W P-5'-P-CCNC: 15 U/L (ref 5–45)
BILIRUB SERPL-MCNC: 1.19 MG/DL (ref 0.2–1)
BILIRUB UR QL STRIP: NEGATIVE
BUN SERPL-MCNC: 27 MG/DL (ref 5–25)
CALCIUM SERPL-MCNC: 9.7 MG/DL (ref 8.3–10.1)
CHLORIDE SERPL-SCNC: 109 MMOL/L (ref 100–108)
CHOLEST SERPL-MCNC: 191 MG/DL (ref 50–200)
CLARITY UR: CLEAR
CO2 SERPL-SCNC: 28 MMOL/L (ref 21–32)
COLOR UR: YELLOW
CREAT SERPL-MCNC: 1.55 MG/DL (ref 0.6–1.3)
ERYTHROCYTE [DISTWIDTH] IN BLOOD BY AUTOMATED COUNT: 12.4 % (ref 11.6–15.1)
EST. AVERAGE GLUCOSE BLD GHB EST-MCNC: 117 MG/DL
GFR SERPL CREATININE-BSD FRML MDRD: 45 ML/MIN/1.73SQ M
GLUCOSE P FAST SERPL-MCNC: 105 MG/DL (ref 65–99)
GLUCOSE UR STRIP-MCNC: NEGATIVE MG/DL
HBA1C MFR BLD: 5.7 % (ref 4.2–6.3)
HCT VFR BLD AUTO: 42.9 % (ref 36.5–49.3)
HDLC SERPL-MCNC: 38 MG/DL (ref 40–60)
HGB BLD-MCNC: 14.1 G/DL (ref 12–17)
HGB UR QL STRIP.AUTO: NEGATIVE
KETONES UR STRIP-MCNC: NEGATIVE MG/DL
LDLC SERPL CALC-MCNC: 122 MG/DL (ref 0–100)
LEUKOCYTE ESTERASE UR QL STRIP: NEGATIVE
MCH RBC QN AUTO: 30.2 PG (ref 26.8–34.3)
MCHC RBC AUTO-ENTMCNC: 32.9 G/DL (ref 31.4–37.4)
MCV RBC AUTO: 92 FL (ref 82–98)
NITRITE UR QL STRIP: NEGATIVE
PH UR STRIP.AUTO: 6 [PH]
PLATELET # BLD AUTO: 288 THOUSANDS/UL (ref 149–390)
PMV BLD AUTO: 9.5 FL (ref 8.9–12.7)
POTASSIUM SERPL-SCNC: 4.3 MMOL/L (ref 3.5–5.3)
PROT SERPL-MCNC: 6.9 G/DL (ref 6.4–8.2)
PROT UR STRIP-MCNC: NEGATIVE MG/DL
RBC # BLD AUTO: 4.67 MILLION/UL (ref 3.88–5.62)
SODIUM SERPL-SCNC: 143 MMOL/L (ref 136–145)
SP GR UR STRIP.AUTO: 1.02 (ref 1–1.03)
T4 FREE SERPL-MCNC: 0.84 NG/DL (ref 0.76–1.46)
TRIGL SERPL-MCNC: 154 MG/DL
TSH SERPL DL<=0.05 MIU/L-ACNC: 5.98 UIU/ML (ref 0.36–3.74)
UROBILINOGEN UR QL STRIP.AUTO: 0.2 E.U./DL
WBC # BLD AUTO: 10.22 THOUSAND/UL (ref 4.31–10.16)

## 2019-10-17 PROCEDURE — 84439 ASSAY OF FREE THYROXINE: CPT

## 2019-10-17 PROCEDURE — 83036 HEMOGLOBIN GLYCOSYLATED A1C: CPT

## 2019-10-17 PROCEDURE — 84443 ASSAY THYROID STIM HORMONE: CPT

## 2019-10-17 PROCEDURE — 81003 URINALYSIS AUTO W/O SCOPE: CPT

## 2019-10-17 PROCEDURE — 80053 COMPREHEN METABOLIC PANEL: CPT

## 2019-10-17 PROCEDURE — 85027 COMPLETE CBC AUTOMATED: CPT

## 2019-10-17 PROCEDURE — 36415 COLL VENOUS BLD VENIPUNCTURE: CPT

## 2019-10-17 PROCEDURE — 80061 LIPID PANEL: CPT

## 2019-10-17 RX ORDER — IRBESARTAN 150 MG/1
150 TABLET ORAL
Qty: 90 TABLET | Refills: 3 | Status: SHIPPED | OUTPATIENT
Start: 2019-10-17 | End: 2020-10-02 | Stop reason: SDUPTHER

## 2019-10-22 ENCOUNTER — OFFICE VISIT (OUTPATIENT)
Dept: FAMILY MEDICINE CLINIC | Facility: CLINIC | Age: 69
End: 2019-10-22
Payer: COMMERCIAL

## 2019-10-22 VITALS
RESPIRATION RATE: 18 BRPM | TEMPERATURE: 97.7 F | WEIGHT: 267 LBS | OXYGEN SATURATION: 99 % | HEIGHT: 77 IN | DIASTOLIC BLOOD PRESSURE: 80 MMHG | SYSTOLIC BLOOD PRESSURE: 130 MMHG | HEART RATE: 62 BPM | BODY MASS INDEX: 31.53 KG/M2

## 2019-10-22 DIAGNOSIS — E78.5 HYPERLIPIDEMIA, UNSPECIFIED HYPERLIPIDEMIA TYPE: ICD-10-CM

## 2019-10-22 DIAGNOSIS — R73.9 HYPERGLYCEMIA: ICD-10-CM

## 2019-10-22 DIAGNOSIS — N18.30 CHRONIC KIDNEY DISEASE, STAGE 3 (HCC): ICD-10-CM

## 2019-10-22 DIAGNOSIS — C64.1 MALIGNANT NEOPLASM OF RIGHT KIDNEY, EXCEPT RENAL PELVIS (HCC): ICD-10-CM

## 2019-10-22 DIAGNOSIS — E03.9 HYPOTHYROIDISM, UNSPECIFIED TYPE: ICD-10-CM

## 2019-10-22 DIAGNOSIS — E66.9 OBESITY (BMI 30-39.9): ICD-10-CM

## 2019-10-22 DIAGNOSIS — H93.19 TINNITUS, UNSPECIFIED LATERALITY: ICD-10-CM

## 2019-10-22 DIAGNOSIS — I10 ESSENTIAL HYPERTENSION: Primary | ICD-10-CM

## 2019-10-22 PROCEDURE — 3008F BODY MASS INDEX DOCD: CPT | Performed by: FAMILY MEDICINE

## 2019-10-22 PROCEDURE — 3079F DIAST BP 80-89 MM HG: CPT | Performed by: FAMILY MEDICINE

## 2019-10-22 PROCEDURE — 3075F SYST BP GE 130 - 139MM HG: CPT | Performed by: FAMILY MEDICINE

## 2019-10-22 PROCEDURE — 99214 OFFICE O/P EST MOD 30 MIN: CPT | Performed by: FAMILY MEDICINE

## 2019-10-22 RX ORDER — LEVOTHYROXINE SODIUM 0.03 MG/1
25 TABLET ORAL
Qty: 90 TABLET | Refills: 3 | Status: SHIPPED | OUTPATIENT
Start: 2019-10-22 | End: 2020-10-02 | Stop reason: SDUPTHER

## 2019-10-22 NOTE — PATIENT INSTRUCTIONS
Start levothyroxine 25 mcg daily on empty stomach  Check thyroid blood work in 2 months, nonfasting  Patient return in 4 months for office visit blood work    Diet exercise and weight loss recommended

## 2019-10-22 NOTE — PROGRESS NOTES
Assessment and Plan:  1  Hypertension, stable continue present therapy  2  Hypothyroidism  Will start levothyroxine 25 mcg daily check T4  In TSH in 2 months   Patient instructed to take levothyroxine on an empty stomach and do not eat or drink for at least 30 minutes per for bleed 1 hour  3  Hyperglycemia, diet-controlled  4  Hyperlipidemia, I believe when patient euthyroid this will even improved   5  CKD -3, stable follows with Nephrology GFR is 45   6  Renal carcinoma, stable status post nephrectomy on the right side  Patient follows with Urology  7  Obesity, BMI of 31 66 discussed patient gained 9 lb diet exercise and weight loss recommended  I believe making euthyroid will help this   8  Tinnitus, chronic and mild patient declines any intervention at the present time  9  Patient return in 6 months for office visit blood work sooner if needed            Problem List Items Addressed This Visit        Endocrine    Hypothyroidism      Start levothyroxine 25 mcg daily check T4 TSH in 2 months         Relevant Medications    levothyroxine 25 mcg tablet    Other Relevant Orders    T4    TSH, 3rd generation with Free T4 reflex    CBC    Comprehensive metabolic panel    Hemoglobin A1C    Lipid Panel with Direct LDL reflex    T4    TSH, 3rd generation with Free T4 reflex    Urinalysis with reflex to microscopic       Cardiovascular and Mediastinum    Hypertension - Primary      Stable continue present therapy         Relevant Orders    CBC    Comprehensive metabolic panel    Hemoglobin A1C    Lipid Panel with Direct LDL reflex    T4    TSH, 3rd generation with Free T4 reflex    Urinalysis with reflex to microscopic       Genitourinary    Chronic kidney disease, stage 3 (HCC)      GFR is 45 patient follows with Nephrology         Relevant Orders    CBC    Comprehensive metabolic panel    Hemoglobin A1C    Lipid Panel with Direct LDL reflex    T4    TSH, 3rd generation with Free T4 reflex    Urinalysis with reflex to microscopic    Malignant neoplasm of right kidney, except renal pelvis (HCC)      Stable follows with Dr Cisco Liang, status post right nephrectomy         Relevant Orders    CBC    Comprehensive metabolic panel    Hemoglobin A1C    Lipid Panel with Direct LDL reflex    T4    TSH, 3rd generation with Free T4 reflex    Urinalysis with reflex to microscopic       Other    Hyperglycemia      Diet controlled         Relevant Orders    CBC    Comprehensive metabolic panel    Hemoglobin A1C    Lipid Panel with Direct LDL reflex    T4    TSH, 3rd generation with Free T4 reflex    Urinalysis with reflex to microscopic    Hyperlipidemia      Diet controlled         Relevant Orders    CBC    Comprehensive metabolic panel    Hemoglobin A1C    Lipid Panel with Direct LDL reflex    T4    TSH, 3rd generation with Free T4 reflex    Urinalysis with reflex to microscopic    Tinnitus      Offered ENT evaluation patient declined at the present time         Relevant Orders    CBC    Comprehensive metabolic panel    Hemoglobin A1C    Lipid Panel with Direct LDL reflex    T4    TSH, 3rd generation with Free T4 reflex    Urinalysis with reflex to microscopic    Obesity (BMI 30-39  9)      Patient gained 9 lb diet exercise and weight loss recommended                      Diagnoses and all orders for this visit:    Essential hypertension  -     CBC; Future  -     Comprehensive metabolic panel; Future  -     Hemoglobin A1C; Future  -     Lipid Panel with Direct LDL reflex; Future  -     T4; Future  -     TSH, 3rd generation with Free T4 reflex; Future  -     Urinalysis with reflex to microscopic; Future    Hypothyroidism, unspecified type  -     levothyroxine 25 mcg tablet; Take 1 tablet (25 mcg total) by mouth daily in the early morning  -     T4; Future  -     TSH, 3rd generation with Free T4 reflex; Future  -     CBC; Future  -     Comprehensive metabolic panel;  Future  -     Hemoglobin A1C; Future  -     Lipid Panel with Direct LDL reflex; Future  -     T4; Future  -     TSH, 3rd generation with Free T4 reflex; Future  -     Urinalysis with reflex to microscopic; Future    Chronic kidney disease, stage 3 (HCC)  -     CBC; Future  -     Comprehensive metabolic panel; Future  -     Hemoglobin A1C; Future  -     Lipid Panel with Direct LDL reflex; Future  -     T4; Future  -     TSH, 3rd generation with Free T4 reflex; Future  -     Urinalysis with reflex to microscopic; Future    Malignant neoplasm of right kidney, except renal pelvis (HCC)  -     CBC; Future  -     Comprehensive metabolic panel; Future  -     Hemoglobin A1C; Future  -     Lipid Panel with Direct LDL reflex; Future  -     T4; Future  -     TSH, 3rd generation with Free T4 reflex; Future  -     Urinalysis with reflex to microscopic; Future    Hyperglycemia  -     CBC; Future  -     Comprehensive metabolic panel; Future  -     Hemoglobin A1C; Future  -     Lipid Panel with Direct LDL reflex; Future  -     T4; Future  -     TSH, 3rd generation with Free T4 reflex; Future  -     Urinalysis with reflex to microscopic; Future    Hyperlipidemia, unspecified hyperlipidemia type  -     CBC; Future  -     Comprehensive metabolic panel; Future  -     Hemoglobin A1C; Future  -     Lipid Panel with Direct LDL reflex; Future  -     T4; Future  -     TSH, 3rd generation with Free T4 reflex; Future  -     Urinalysis with reflex to microscopic; Future    Tinnitus, unspecified laterality  -     CBC; Future  -     Comprehensive metabolic panel; Future  -     Hemoglobin A1C; Future  -     Lipid Panel with Direct LDL reflex; Future  -     T4; Future  -     TSH, 3rd generation with Free T4 reflex; Future  -     Urinalysis with reflex to microscopic; Future    Obesity (BMI 30-39  9)              Subjective:      Patient ID: Melissa Moreno is a 70 y o  male  CC:    Chief Complaint   Patient presents with    Follow-up     pt is here for a 6 month follow up, no complaints       HPI:     Patient is doing well  Patient tells me he does have tinnitus but declines any therapy evaluation at the present time  Patient gained 9 lb since last office visit, blood work was discussed with the patient      The following portions of the patient's history were reviewed and updated as appropriate: allergies, current medications, past family history, past medical history, past social history, past surgical history and problem list       Review of Systems   Constitutional:         HPI   HENT:         HPI   Eyes: Negative  Respiratory: Negative  Cardiovascular: Negative  Gastrointestinal: Negative  Endocrine: Negative  Genitourinary: Negative  Musculoskeletal: Negative  Skin: Negative  Allergic/Immunologic: Negative  Neurological: Negative  Hematological: Negative  Psychiatric/Behavioral: Negative  Data to review:       Objective:    Vitals:    10/22/19 0853   BP: 130/80   BP Location: Left arm   Patient Position: Sitting   Cuff Size: Adult   Pulse: 62   Resp: 18   Temp: 97 7 °F (36 5 °C)   TempSrc: Temporal   SpO2: 99%   Weight: 121 kg (267 lb)   Height: 6' 5" (1 956 m)        Physical Exam   Constitutional: He is oriented to person, place, and time  He appears well-developed and well-nourished  HENT:   Head: Normocephalic and atraumatic  Right Ear: External ear normal    Left Ear: External ear normal    Nose: Nose normal    Mouth/Throat: Oropharynx is clear and moist  No oropharyngeal exudate  Eyes: Pupils are equal, round, and reactive to light  Conjunctivae and EOM are normal  No scleral icterus  Neck: Neck supple  No JVD present  Cardiovascular: Normal rate, regular rhythm and normal heart sounds  Pulmonary/Chest: Effort normal and breath sounds normal    Abdominal: Soft  Bowel sounds are normal  There is no tenderness  Musculoskeletal: He exhibits no edema  Neurological: He is alert and oriented to person, place, and time  No cranial nerve deficit     Skin: Skin is warm and dry    Psychiatric: He has a normal mood and affect  BMI Counseling: Body mass index is 31 66 kg/m²  The BMI is above normal  Nutrition recommendations include moderation in carbohydrate intake and reducing intake of cholesterol

## 2020-01-07 ENCOUNTER — APPOINTMENT (OUTPATIENT)
Dept: LAB | Facility: CLINIC | Age: 70
End: 2020-01-07
Payer: COMMERCIAL

## 2020-01-07 DIAGNOSIS — E03.9 HYPOTHYROIDISM, UNSPECIFIED TYPE: ICD-10-CM

## 2020-01-07 LAB
T4 SERPL-MCNC: 10.1 UG/DL (ref 4.7–13.3)
TSH SERPL DL<=0.05 MIU/L-ACNC: 2.33 UIU/ML (ref 0.36–3.74)

## 2020-01-07 PROCEDURE — 84436 ASSAY OF TOTAL THYROXINE: CPT

## 2020-01-07 PROCEDURE — 36415 COLL VENOUS BLD VENIPUNCTURE: CPT

## 2020-01-07 PROCEDURE — 84443 ASSAY THYROID STIM HORMONE: CPT

## 2020-06-08 ENCOUNTER — HOSPITAL ENCOUNTER (OUTPATIENT)
Dept: NON INVASIVE DIAGNOSTICS | Facility: HOSPITAL | Age: 70
Discharge: HOME | End: 2020-06-08
Payer: COMMERCIAL

## 2020-06-08 ENCOUNTER — OFFICE VISIT (OUTPATIENT)
Dept: FAMILY MEDICINE CLINIC | Facility: CLINIC | Age: 70
End: 2020-06-08
Payer: COMMERCIAL

## 2020-06-08 VITALS
WEIGHT: 261 LBS | SYSTOLIC BLOOD PRESSURE: 132 MMHG | TEMPERATURE: 97.6 F | HEIGHT: 77 IN | BODY MASS INDEX: 30.82 KG/M2 | RESPIRATION RATE: 18 BRPM | DIASTOLIC BLOOD PRESSURE: 82 MMHG | HEART RATE: 78 BPM

## 2020-06-08 DIAGNOSIS — S80.811A ABRASION OF RIGHT LOWER EXTREMITY, INITIAL ENCOUNTER: ICD-10-CM

## 2020-06-08 DIAGNOSIS — R60.0 PEDAL EDEMA: ICD-10-CM

## 2020-06-08 DIAGNOSIS — Z00.00 HEALTH CARE MAINTENANCE: ICD-10-CM

## 2020-06-08 DIAGNOSIS — M79.661 RIGHT CALF PAIN: ICD-10-CM

## 2020-06-08 DIAGNOSIS — I10 ESSENTIAL HYPERTENSION: ICD-10-CM

## 2020-06-08 DIAGNOSIS — L03.115 CELLULITIS OF RIGHT LOWER EXTREMITY: ICD-10-CM
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
## 2020-06-08 PROCEDURE — 1036F TOBACCO NON-USER: CPT | Performed by: FAMILY MEDICINE

## 2020-06-08 PROCEDURE — 3079F DIAST BP 80-89 MM HG: CPT | Performed by: FAMILY MEDICINE

## 2020-06-08 PROCEDURE — 3075F SYST BP GE 130 - 139MM HG: CPT | Performed by: FAMILY MEDICINE

## 2020-06-08 PROCEDURE — 93971 EXTREMITY STUDY: CPT | Performed by: SURGERY

## 2020-06-08 PROCEDURE — 99214 OFFICE O/P EST MOD 30 MIN: CPT | Performed by: FAMILY MEDICINE

## 2020-06-08 PROCEDURE — G0439 PPPS, SUBSEQ VISIT: HCPCS | Performed by: FAMILY MEDICINE

## 2020-06-08 PROCEDURE — 93971 EXTREMITY STUDY: CPT

## 2020-06-08 PROCEDURE — 4040F PNEUMOC VAC/ADMIN/RCVD: CPT | Performed by: FAMILY MEDICINE

## 2020-06-08 PROCEDURE — 1160F RVW MEDS BY RX/DR IN RCRD: CPT | Performed by: FAMILY MEDICINE

## 2020-06-08 PROCEDURE — 3288F FALL RISK ASSESSMENT DOCD: CPT | Performed by: FAMILY MEDICINE

## 2020-06-08 PROCEDURE — 3008F BODY MASS INDEX DOCD: CPT | Performed by: FAMILY MEDICINE

## 2020-06-08 PROCEDURE — 1170F FXNL STATUS ASSESSED: CPT | Performed by: FAMILY MEDICINE

## 2020-06-08 PROCEDURE — 1125F AMNT PAIN NOTED PAIN PRSNT: CPT | Performed by: FAMILY MEDICINE

## 2020-06-08 PROCEDURE — 1100F PTFALLS ASSESS-DOCD GE2>/YR: CPT | Performed by: FAMILY MEDICINE

## 2020-06-08 RX ORDER — AMOXICILLIN AND CLAVULANATE POTASSIUM 875; 125 MG/1; MG/1
1 TABLET, FILM COATED ORAL EVERY 12 HOURS SCHEDULED
Qty: 20 TABLET | Refills: 0 | Status: SHIPPED | OUTPATIENT
Start: 2020-06-08 | End: 2020-06-17

## 2020-06-17 ENCOUNTER — OFFICE VISIT (OUTPATIENT)
Dept: FAMILY MEDICINE CLINIC | Facility: CLINIC | Age: 70
End: 2020-06-17
Payer: COMMERCIAL

## 2020-06-17 VITALS
SYSTOLIC BLOOD PRESSURE: 146 MMHG | TEMPERATURE: 97.5 F | HEART RATE: 74 BPM | HEIGHT: 77 IN | RESPIRATION RATE: 18 BRPM | WEIGHT: 259.8 LBS | BODY MASS INDEX: 30.67 KG/M2 | DIASTOLIC BLOOD PRESSURE: 90 MMHG

## 2020-06-17 DIAGNOSIS — M79.661 RIGHT CALF PAIN: ICD-10-CM

## 2020-06-17 DIAGNOSIS — L03.115 CELLULITIS OF RIGHT LOWER EXTREMITY: ICD-10-CM

## 2020-06-17 DIAGNOSIS — I10 ESSENTIAL HYPERTENSION: ICD-10-CM

## 2020-06-17 DIAGNOSIS — R60.0 PEDAL EDEMA: ICD-10-CM

## 2020-06-17 DIAGNOSIS — S80.811D ABRASION OF RIGHT LOWER EXTREMITY, SUBSEQUENT ENCOUNTER: Primary | ICD-10-CM

## 2020-06-17 PROCEDURE — 1160F RVW MEDS BY RX/DR IN RCRD: CPT | Performed by: FAMILY MEDICINE

## 2020-06-17 PROCEDURE — 3077F SYST BP >= 140 MM HG: CPT | Performed by: FAMILY MEDICINE

## 2020-06-17 PROCEDURE — 1036F TOBACCO NON-USER: CPT | Performed by: FAMILY MEDICINE

## 2020-06-17 PROCEDURE — 4040F PNEUMOC VAC/ADMIN/RCVD: CPT | Performed by: FAMILY MEDICINE

## 2020-06-17 PROCEDURE — 3008F BODY MASS INDEX DOCD: CPT | Performed by: FAMILY MEDICINE

## 2020-06-17 PROCEDURE — 99214 OFFICE O/P EST MOD 30 MIN: CPT | Performed by: FAMILY MEDICINE

## 2020-06-17 PROCEDURE — 1170F FXNL STATUS ASSESSED: CPT | Performed by: FAMILY MEDICINE

## 2020-06-17 PROCEDURE — 3080F DIAST BP >= 90 MM HG: CPT | Performed by: FAMILY MEDICINE

## 2020-06-17 RX ORDER — DOXYCYCLINE HYCLATE 100 MG/1
100 CAPSULE ORAL EVERY 12 HOURS SCHEDULED
Qty: 20 CAPSULE | Refills: 0 | Status: SHIPPED | OUTPATIENT
Start: 2020-06-17 | End: 2020-06-27

## 2020-07-02 ENCOUNTER — APPOINTMENT (OUTPATIENT)
Dept: LAB | Facility: CLINIC | Age: 70
End: 2020-07-02
Payer: COMMERCIAL

## 2020-07-02 DIAGNOSIS — I10 ESSENTIAL HYPERTENSION: ICD-10-CM

## 2020-07-02 DIAGNOSIS — C64.1 MALIGNANT NEOPLASM OF RIGHT KIDNEY, EXCEPT RENAL PELVIS (HCC): ICD-10-CM

## 2020-07-02 DIAGNOSIS — E03.9 HYPOTHYROIDISM, UNSPECIFIED TYPE: ICD-10-CM

## 2020-07-02 DIAGNOSIS — H93.19 TINNITUS, UNSPECIFIED LATERALITY: ICD-10-CM

## 2020-07-02 DIAGNOSIS — R73.9 HYPERGLYCEMIA: ICD-10-CM

## 2020-07-02 DIAGNOSIS — N18.30 CHRONIC KIDNEY DISEASE, STAGE 3 (HCC): ICD-10-CM

## 2020-07-02 DIAGNOSIS — E78.5 HYPERLIPIDEMIA, UNSPECIFIED HYPERLIPIDEMIA TYPE: ICD-10-CM

## 2020-07-02 LAB
ALBUMIN SERPL BCP-MCNC: 3.8 G/DL (ref 3.5–5)
ALP SERPL-CCNC: 77 U/L (ref 46–116)
ALT SERPL W P-5'-P-CCNC: 18 U/L (ref 12–78)
ANION GAP SERPL CALCULATED.3IONS-SCNC: 7 MMOL/L (ref 4–13)
AST SERPL W P-5'-P-CCNC: 17 U/L (ref 5–45)
BILIRUB SERPL-MCNC: 1.03 MG/DL (ref 0.2–1)
BILIRUB UR QL STRIP: NEGATIVE
BUN SERPL-MCNC: 21 MG/DL (ref 5–25)
CALCIUM SERPL-MCNC: 9.2 MG/DL (ref 8.3–10.1)
CHLORIDE SERPL-SCNC: 106 MMOL/L (ref 100–108)
CHOLEST SERPL-MCNC: 169 MG/DL (ref 50–200)
CLARITY UR: CLEAR
CO2 SERPL-SCNC: 25 MMOL/L (ref 21–32)
COLOR UR: YELLOW
CREAT SERPL-MCNC: 1.54 MG/DL (ref 0.6–1.3)
ERYTHROCYTE [DISTWIDTH] IN BLOOD BY AUTOMATED COUNT: 12.3 % (ref 11.6–15.1)
EST. AVERAGE GLUCOSE BLD GHB EST-MCNC: 111 MG/DL
GFR SERPL CREATININE-BSD FRML MDRD: 45 ML/MIN/1.73SQ M
GLUCOSE P FAST SERPL-MCNC: 97 MG/DL (ref 65–99)
GLUCOSE UR STRIP-MCNC: NEGATIVE MG/DL
HBA1C MFR BLD: 5.5 %
HCT VFR BLD AUTO: 43.1 % (ref 36.5–49.3)
HDLC SERPL-MCNC: 38 MG/DL
HGB BLD-MCNC: 14.2 G/DL (ref 12–17)
HGB UR QL STRIP.AUTO: NEGATIVE
KETONES UR STRIP-MCNC: NEGATIVE MG/DL
LDLC SERPL CALC-MCNC: 84 MG/DL (ref 0–100)
LEUKOCYTE ESTERASE UR QL STRIP: NEGATIVE
MCH RBC QN AUTO: 30.5 PG (ref 26.8–34.3)
MCHC RBC AUTO-ENTMCNC: 32.9 G/DL (ref 31.4–37.4)
MCV RBC AUTO: 93 FL (ref 82–98)
NITRITE UR QL STRIP: NEGATIVE
PH UR STRIP.AUTO: 6.5 [PH]
PLATELET # BLD AUTO: 262 THOUSANDS/UL (ref 149–390)
PMV BLD AUTO: 9.4 FL (ref 8.9–12.7)
POTASSIUM SERPL-SCNC: 4.2 MMOL/L (ref 3.5–5.3)
PROT SERPL-MCNC: 6.8 G/DL (ref 6.4–8.2)
PROT UR STRIP-MCNC: NEGATIVE MG/DL
RBC # BLD AUTO: 4.65 MILLION/UL (ref 3.88–5.62)
SODIUM SERPL-SCNC: 138 MMOL/L (ref 136–145)
SP GR UR STRIP.AUTO: 1.02 (ref 1–1.03)
T4 SERPL-MCNC: 9 UG/DL (ref 4.7–13.3)
TRIGL SERPL-MCNC: 235 MG/DL
TSH SERPL DL<=0.05 MIU/L-ACNC: 3.71 UIU/ML (ref 0.36–3.74)
UROBILINOGEN UR QL STRIP.AUTO: 0.2 E.U./DL
WBC # BLD AUTO: 9.99 THOUSAND/UL (ref 4.31–10.16)

## 2020-07-02 PROCEDURE — 80053 COMPREHEN METABOLIC PANEL: CPT

## 2020-07-02 PROCEDURE — 85027 COMPLETE CBC AUTOMATED: CPT

## 2020-07-02 PROCEDURE — 80061 LIPID PANEL: CPT

## 2020-07-02 PROCEDURE — 84436 ASSAY OF TOTAL THYROXINE: CPT

## 2020-07-02 PROCEDURE — 83036 HEMOGLOBIN GLYCOSYLATED A1C: CPT

## 2020-07-02 PROCEDURE — 81003 URINALYSIS AUTO W/O SCOPE: CPT

## 2020-07-02 PROCEDURE — 36415 COLL VENOUS BLD VENIPUNCTURE: CPT

## 2020-07-02 PROCEDURE — 84443 ASSAY THYROID STIM HORMONE: CPT

## 2020-07-08 ENCOUNTER — OFFICE VISIT (OUTPATIENT)
Dept: FAMILY MEDICINE CLINIC | Facility: CLINIC | Age: 70
End: 2020-07-08
Payer: COMMERCIAL

## 2020-07-08 VITALS
RESPIRATION RATE: 20 BRPM | BODY MASS INDEX: 30.7 KG/M2 | WEIGHT: 260 LBS | TEMPERATURE: 98 F | SYSTOLIC BLOOD PRESSURE: 138 MMHG | HEART RATE: 80 BPM | HEIGHT: 77 IN | DIASTOLIC BLOOD PRESSURE: 76 MMHG

## 2020-07-08 DIAGNOSIS — Z00.00 HEALTH CARE MAINTENANCE: ICD-10-CM

## 2020-07-08 DIAGNOSIS — N40.1 BENIGN PROSTATIC HYPERPLASIA WITH NOCTURIA: ICD-10-CM

## 2020-07-08 DIAGNOSIS — S80.811D ABRASION OF RIGHT LOWER EXTREMITY, SUBSEQUENT ENCOUNTER: ICD-10-CM

## 2020-07-08 DIAGNOSIS — R35.1 BENIGN PROSTATIC HYPERPLASIA WITH NOCTURIA: ICD-10-CM

## 2020-07-08 DIAGNOSIS — C64.1 MALIGNANT NEOPLASM OF RIGHT KIDNEY, EXCEPT RENAL PELVIS (HCC): ICD-10-CM

## 2020-07-08 DIAGNOSIS — S70.02XA CONTUSION OF LEFT HIP, INITIAL ENCOUNTER: ICD-10-CM

## 2020-07-08 DIAGNOSIS — N18.30 CHRONIC KIDNEY DISEASE, STAGE 3 (HCC): ICD-10-CM

## 2020-07-08 DIAGNOSIS — E78.5 HYPERLIPIDEMIA, UNSPECIFIED HYPERLIPIDEMIA TYPE: ICD-10-CM

## 2020-07-08 DIAGNOSIS — R73.9 HYPERGLYCEMIA: ICD-10-CM

## 2020-07-08 DIAGNOSIS — E03.9 HYPOTHYROIDISM, UNSPECIFIED TYPE: ICD-10-CM

## 2020-07-08 DIAGNOSIS — T14.8XXA HEMATOMA: ICD-10-CM
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
## 2020-07-08 PROCEDURE — 4040F PNEUMOC VAC/ADMIN/RCVD: CPT | Performed by: FAMILY MEDICINE

## 2020-07-08 PROCEDURE — G0439 PPPS, SUBSEQ VISIT: HCPCS | Performed by: FAMILY MEDICINE

## 2020-07-08 PROCEDURE — 3078F DIAST BP <80 MM HG: CPT | Performed by: FAMILY MEDICINE

## 2020-07-08 PROCEDURE — 1170F FXNL STATUS ASSESSED: CPT | Performed by: FAMILY MEDICINE

## 2020-07-08 PROCEDURE — 3008F BODY MASS INDEX DOCD: CPT | Performed by: FAMILY MEDICINE

## 2020-07-08 PROCEDURE — 1160F RVW MEDS BY RX/DR IN RCRD: CPT | Performed by: FAMILY MEDICINE

## 2020-07-08 PROCEDURE — 1125F AMNT PAIN NOTED PAIN PRSNT: CPT | Performed by: FAMILY MEDICINE

## 2020-07-08 PROCEDURE — 1036F TOBACCO NON-USER: CPT | Performed by: FAMILY MEDICINE

## 2020-07-08 PROCEDURE — 3075F SYST BP GE 130 - 139MM HG: CPT | Performed by: FAMILY MEDICINE

## 2020-07-08 PROCEDURE — 99214 OFFICE O/P EST MOD 30 MIN: CPT | Performed by: FAMILY MEDICINE

## 2020-07-08 NOTE — PROGRESS NOTES
Assessment and Plan:  1  Fall  2  Contusion left hip  3  Hematoma, patient use ice 15 minutes 3-4 times daily  Patient use Tylenol  X-rays ordered  Return if not starting to resolve in 3-4 days and not resolved in a few weeks  4  Hypertension, stable continue present therapy  5  Hyperglycemia diet controlled  6  Hyperlipidemia, diet controlled  7  Hypothyroidism, stable on levothyroxine 25 mcg daily  8  Medicare a [address] [place] [address] completed for healthcare maintenance  9  CKD-3, stable on Arb, follows with Nephrology  10  BPH/right kidney malignancy status post nephrectomy, stable follows with Urology  11  Right lower extremity abrasion/wound, was fully resolved  Return in 1-2 weeks if still with symptoms  12  Formal follow-up is scheduled for 6 months for office visit blood work sooner if needed         Problem List Items Addressed This Visit        Endocrine    Hypothyroidism     Stable continue present therapy         Relevant Orders    CBC    Comprehensive metabolic panel    Hemoglobin A1C    Lipid Panel with Direct LDL reflex    T4    TSH, 3rd generation with Free T4 reflex    UA (URINE) with reflex to Scope       Genitourinary    Chronic kidney disease, stage 3 (HCC)     Stable on Avapro    Follow-up with Nephrology         Relevant Orders    CBC    Comprehensive metabolic panel    Hemoglobin A1C    Lipid Panel with Direct LDL reflex    T4    TSH, 3rd generation with Free T4 reflex    UA (URINE) with reflex to Scope    Malignant neoplasm of right kidney, except renal pelvis (HCC)     Status post right nephrectomy follows with urology         Relevant Orders    CBC    Comprehensive metabolic panel    Hemoglobin A1C    Lipid Panel with Direct LDL reflex    T4    TSH, 3rd generation with Free T4 reflex    UA (URINE) with reflex to Scope       Other    Benign prostatic hyperplasia with nocturia     Follows with urology         Relevant Orders    CBC    Comprehensive metabolic panel    Hemoglobin A1C    Lipid Panel with Direct LDL reflex    T4    TSH, 3rd generation with Free T4 reflex    UA (URINE) with reflex to Scope    Hyperglycemia     Diet controlled         Relevant Orders    CBC    Comprehensive metabolic panel    Hemoglobin A1C    Lipid Panel with Direct LDL reflex    T4    TSH, 3rd generation with Free T4 reflex    UA (URINE) with reflex to Scope    Hyperlipidemia     Diet controlled         Relevant Orders    CBC    Comprehensive metabolic panel    Hemoglobin A1C    Lipid Panel with Direct LDL reflex    T4    TSH, 3rd generation with Free T4 reflex    UA (URINE) with reflex to Scope    [facility]     Medicare a [address] completed           Other Visit Diagnoses     Fall, initial encounter    -  Primary    Relevant Orders    XR hip/pelv 2-3 vws left if performed    Contusion of left hip, initial encounter        Relevant Orders    XR hip/pelv 2-3 vws left if performed    Hematoma        Relevant Orders    CBC    Comprehensive metabolic panel    Hemoglobin A1C    Lipid Panel with Direct LDL reflex    T4    TSH, 3rd generation with Free T4 reflex    UA (URINE) with reflex to Scope    XR hip/pelv 2-3 vws left if performed    Abrasion of right lower extremity, subsequent encounter                     Diagnoses and all orders for this visit:    Fall, initial encounter  -     XR hip/pelv 2-3 vws left if performed; Future    Contusion of left hip, initial encounter  -     XR hip/pelv 2-3 vws left if performed; Future    Hematoma  -     CBC; Future  -     Comprehensive metabolic panel; Future  -     Hemoglobin A1C; Future  -     Lipid Panel with Direct LDL reflex; Future  -     T4; Future  -     TSH, 3rd generation with Free T4 reflex; Future  -     UA (URINE) with reflex to Scope; Future  -     XR hip/pelv 2-3 vws left if performed; Future    Hypothyroidism, unspecified type  -     CBC; Future  -     Comprehensive metabolic panel; Future  -     Hemoglobin A1C; Future  -     Lipid Panel with Direct LDL reflex;  Future  - T4; Future  -     TSH, 3rd generation with Free T4 reflex; Future  -     UA (URINE) with reflex to Scope; Future    Chronic kidney disease, stage 3 (HCC)  -     CBC; Future  -     Comprehensive metabolic panel; Future  -     Hemoglobin A1C; Future  -     Lipid Panel with Direct LDL reflex; Future  -     T4; Future  -     TSH, 3rd generation with Free T4 reflex; Future  -     UA (URINE) with reflex to Scope; Future    Malignant neoplasm of right kidney, except renal pelvis (HCC)  -     CBC; Future  -     Comprehensive metabolic panel; Future  -     Hemoglobin A1C; Future  -     Lipid Panel with Direct LDL reflex; Future  -     T4; Future  -     TSH, 3rd generation with Free T4 reflex; Future  -     UA (URINE) with reflex to Scope; Future    Benign prostatic hyperplasia with nocturia  -     CBC; Future  -     Comprehensive metabolic panel; Future  -     Hemoglobin A1C; Future  -     Lipid Panel with Direct LDL reflex; Future  -     T4; Future  -     TSH, 3rd generation with Free T4 reflex; Future  -     UA (URINE) with reflex to Scope; Future    Hyperglycemia  -     CBC; Future  -     Comprehensive metabolic panel; Future  -     Hemoglobin A1C; Future  -     Lipid Panel with Direct LDL reflex; Future  -     T4; Future  -     TSH, 3rd generation with Free T4 reflex; Future  -     UA (URINE) with reflex to Scope; Future    Hyperlipidemia, unspecified hyperlipidemia type  -     CBC; Future  -     Comprehensive metabolic panel; Future  -     Hemoglobin A1C; Future  -     Lipid Panel with Direct LDL reflex; Future  -     T4; Future  -     TSH, 3rd generation with Free T4 reflex; Future  -     UA (URINE) with reflex to Scope; Future    Health care maintenance    Abrasion of right lower extremity, subsequent encounter              Subjective:      Patient ID: Simba Cervnates is a 78 y o  male  CC:    Chief Complaint   Patient presents with    Follow-up     Patient present today for his routine follow up   Patient also menitoned he fell today at the bath tub and fell on the left side of his left leg   Medicare Wellness Visit     pt due for his medicare wellness visit   Skin Lesion     per patient the skin lesion on on his right leg is still having a little bit of discharge  HPI:    While showering earlier today patient was holding onto the hand rail hand real came off and patient fell left hip  He has a large hematoma not much pain  Patient is ICM  Patient's right lower extremity open wound is almost fully healed  Blood work was discussed with the patient  The following portions of the patient's history were reviewed and updated as appropriate: allergies, current medications, past family history, past medical history, past social history, past surgical history and problem list       Review of Systems   Constitutional: Negative  HENT: Negative  Eyes: Negative  Respiratory: Negative  Cardiovascular: Negative  Gastrointestinal: Negative  Endocrine: Negative  Genitourinary: Negative  Musculoskeletal:        HPI   Skin:        HPI   Allergic/Immunologic: Negative  Neurological: Negative  Hematological: Negative  Psychiatric/Behavioral: Negative  Data to review:       Objective:    Vitals:    07/08/20 1428   BP: 138/76   BP Location: Left arm   Patient Position: Sitting   Cuff Size: Large   Pulse: 80   Resp: 20   Temp: 98 °F (36 7 °C)   TempSrc: Temporal   Weight: 118 kg (260 lb)   Height: 6' 5" (1 956 m)        Physical Exam   Constitutional: He is oriented to person, place, and time  He appears well-developed and well-nourished  HENT:   Head: Normocephalic and atraumatic  Eyes: No scleral icterus  Neck: Neck supple  No JVD present  Cardiovascular: Normal rate, regular rhythm and normal heart sounds  Pulmonary/Chest: Effort normal and breath sounds normal    Abdominal: Soft  Bowel sounds are normal  There is no tenderness     Musculoskeletal: He exhibits edema and tenderness  He exhibits no deformity  Left greater trochanteric area with a hematoma approximately 4 x 6 in  Negative gross deformity   Neurological: He is alert and oriented to person, place, and time  No cranial nerve deficit  Skin: Skin is warm and dry  Subcutaneous mass as above  Right lower extremity almost complete resolution of open wound   Psychiatric: He has a normal mood and affect  Falls Plan of Care: Balance, strength, and gait training instructions were provided

## 2020-07-08 NOTE — PROGRESS NOTES
Assessment and Plan:     Problem List Items Addressed This Visit     None           Preventive health issues were discussed with patient, and age appropriate screening tests were ordered as noted in patient's After Visit Summary  Personalized health advice and appropriate referrals for health education or preventive services given if needed, as noted in patient's After Visit Summary  History of Present Illness:     Patient presents for Medicare Annual Wellness visit    Patient Care Team:  DO chong Steiner PCP - General  SIERRA Fernando PA-C Viona Doe, PA-C     Problem List:     Patient Active Problem List   Diagnosis    Inguinal hernia unilateral, non-recurrent    Allergic rhinitis    Benign prostatic hyperplasia with nocturia    Cholelithiasis    Chronic kidney disease, stage 3 ([facility] )    Colon polyp    ED (erectile dysfunction)    Gait disturbance    Hyperglycemia    Hyperlipidemia    Hypertension    Left knee pain    Osteoarthritis    Malignant neoplasm of right kidney, except renal pelvis ([facility] )    Health care maintenance    Tinnitus    Personal history of kidney cancer    Gilbert's syndrome    Hypothyroidism    Obesity (BMI 30-39  9)      Past Medical and Surgical History:     Past Medical History:   Diagnosis Date    Arthritis     Cancer [facility])     right kidney     Cellulitis of left leg     June 2016    Chronic kidney disease     Exercises 3 to 4 times per week     Full dentures     Hypertension     Inguinal hernia, right     last assessed: 7/6/2016    Malignant neoplasm of kidney [facility])     unspecified laterality Managed by Mona Zamora MD Last assessed: 3/1/2016    Renal cell carcinoma ([facility] )     Seasonal allergies     Thrombosis     superficial left leg June 2016    Varicose veins of both lower extremities      Past Surgical History:   Procedure Laterality Date    APPENDECTOMY      COLONOSCOPY      CYSTOSCOPY      Diagnostic    HERNIA REPAIR Right 7/21/2016    Procedure: REPAIR HERNIA INGUINAL, LAPAROSCOPIC;  Surgeon: Lorenzo Nova MD;  Location: [facility];  Service:     HIP ARTHROPLASTY Left     twice 1999 and 2011    HIP ARTHROPLASTY Right     [address] 48  07/21/2016    description: laparoscopic right inguinal hernia repair with mesh     NEPHRECTOMY Right     due to a tumor    ROTATOR CUFF REPAIR Left     TOTAL HIP ARTHROPLASTY      TOTAL HIP ARTHROPLASTY Bilateral       Family History:     Family History   Problem Relation Age of Onset    Heart attack Mother     Cancer Father       Social History:        Social History     Socioeconomic History    Marital status: /Civil Union     Spouse name: None    Number of children: None    Years of education: None    Highest education level: None   Occupational History    None   Social Needs    Financial resource strain: None    Food insecurity:     Worry: None     Inability: None    Transportation needs:     Medical: None     Non-medical: None   Tobacco Use    Smoking status: Former Smoker    Smokeless tobacco: Never Used   Substance and Sexual Activity    Alcohol use: Yes     Comment: "very little" Per allscripts - social    Drug use: No    Sexual activity: None   Lifestyle    Physical activity:     Days per week: None     Minutes per session: None    Stress: None   Relationships    Social connections:     Talks on phone: None     Gets together: None     Attends Confucianist service: None     Active member of club or organization: None     Attends meetings of clubs or organizations: None     Relationship status: None    Intimate partner violence:     Fear of current or ex partner: None     Emotionally abused: None     Physically abused: None     Forced sexual activity: None   Other Topics Concern    None   Social History Narrative    None      Medications and Allergies:     Current Outpatient Medications   Medication Sig Dispense Refill    irbesartan (AVAPRO) 150 mg tablet Take 1 tablet (150 mg total) by mouth daily at bedtime 90 tablet 3    levothyroxine 25 mcg tablet Take 1 tablet (25 mcg total) by mouth daily in the early morning 90 tablet 3    mupirocin (BACTROBAN) 2 % ointment Apply topically 3 (three) times a day 22 g 0     No current facility-administered medications for this visit  Allergies   Allergen Reactions    Nsaids Other (See Comments)     Instructed to avoid all ibuprofen's/NSAIDS  as pt only has one kidney      Immunizations:     Immunization History   Administered Date(s) Administered    INFLUENZA 10/10/2019    Influenza Split High Dose Preservative Free IM 11/24/2015, 11/08/2016, 09/20/2017    Influenza TIV (IM) 12/19/2011, 12/31/2013    Influenza, high dose seasonal 0 5 mL 10/05/2018    Influenza, injectable, quadrivalent, preservative free 0 5 mL 10/01/2019    Pneumococcal Conjugate 13-Valent 03/06/2017    Pneumococcal Polysaccharide PPV23 12/11/2014    Td (adult), adsorbed 01/01/2011    Tdap 03/06/2017    Zoster 02/26/2013      Health Maintenance:         Topic Date Due    Hepatitis C Screening  1950    CRC Screening: Colonoscopy  11/20/2028         Topic Date Due    Influenza Vaccine  07/01/2020      Medicare Health Risk Assessment:     /76 (BP Location: Left arm, Patient Position: Sitting, Cuff Size: Large)   Pulse 80   Temp 98 °F (36 7 °C) (Temporal)   Resp 20   Ht 6' 5" (1 956 m)   Wt 118 kg (260 lb)   BMI 30 83 kg/m²      Allyssa Leroy is here for his Subsequent Wellness visit  Health Risk Assessment:   Patient rates overall health as good  Patient feels that their physical health rating is slightly worse  Eyesight was rated as slightly worse  Hearing was rated as slightly worse  Patient feels that their emotional and mental health rating is same  Pain experienced in the last 7 days has been some  Patient's pain rating has been 3/10   Patient states that he has experienced no weight loss or gain in last 6 months  See 2nd note    Fall Risk Screening: In the past year, patient has experienced: history of falling in past year    Number of falls: 2 or more  Injured during fall?: Yes    Feels unsteady when standing or walking?: Yes    Worried about falling?: Yes      Home Safety:  Patient has trouble with stairs inside or outside of their home  Patient has working smoke alarms and has working carbon monoxide detector  Home safety hazards include: none  See 2nd note of the day    Nutrition:   Current diet is Regular  Medications:   Patient is not currently taking any over-the-counter supplements  Patient is able to manage medications  Activities of Daily Living (ADLs)/Instrumental Activities of Daily Living (IADLs):   Walk and transfer into and out of bed and chair?: Yes  Dress and groom yourself?: Yes    Bathe or shower yourself?: Yes    Feed yourself? Yes  Do your laundry/housekeeping?: Yes  Manage your money, pay your bills and track your expenses?: Yes  Make your own meals?: Yes    Do your own shopping?: Yes    Previous Hospitalizations:   Any hospitalizations or ED visits within the last 12 months?: No      Advance Care Planning:   Living will: Yes    Durable POA for healthcare:  Yes    Advanced directive: Yes    Advanced directive counseling given: Yes    Five wishes given: No    Patient declined ACP directive: Yes    End of Life Decisions reviewed with patient: Yes    Provider agrees with end of life decisions: Yes      Cognitive Screening:   Provider or family/friend/caregiver concerned regarding cognition?: No    PREVENTIVE SCREENINGS      Cardiovascular Screening:    General: Screening Not Indicated and History Lipid Disorder      Diabetes Screening:     General: Screening Current      Colorectal Cancer Screening:     General: Screening Current      Osteoporosis Screening:    General: Screening Not Indicated      Abdominal Aortic Aneurysm (AAA) Screening:    Risk factors include: age between 73-69 yo and tobacco use        General: Screening Not Indicated      Lung Cancer Screening:     General: Screening Not Indicated      Hepatitis C Screening:    General: Patient Declines    Hep C Screening Accepted: Melyssa Sparks DO

## 2020-07-08 NOTE — PATIENT INSTRUCTIONS
Fall Prevention   AMBULATORY CARE:   Fall prevention  includes ways to make your home and other areas safer  It also includes ways you can move more carefully to prevent a fall  Health conditions that cause changes in your blood pressure, vision, or muscle strength and coordination may increase your risk for falls  Medicines may also increase your risk for falls if they make you dizzy, weak, or sleepy  Call 911 or have someone else call if:   · You have fallen and are unconscious  · You have fallen and cannot move part of your body  Contact your healthcare provider if:   · You have fallen and have pain or a headache  · You have questions or concerns about your condition or care  Fall prevention tips:   · Stand or sit up slowly  This may help you keep your balance and prevent falls  · Use assistive devices as directed  Your healthcare provider may suggest that you use a cane or walker to help you keep your balance  You may need to have grab bars put in your bathroom near the toilet or in the shower  · Wear shoes that fit well and have soles that   Wear shoes both inside and outside  Use slippers with good   Do not wear shoes with high heels  · Wear a personal alarm  This is a device that allows you to call 911 if you fall and need help  Ask your healthcare provider for more information  · Stay active  Exercise can help strengthen your muscles and improve your balance  Your healthcare provider may recommend water aerobics or walking  He or she may also recommend physical therapy to improve your coordination  Never start an exercise program without talking to your healthcare provider first      · Manage your medical conditions  Keep all appointments with your healthcare providers  Visit your eye doctor as directed  Home safety tips:   · Add items to prevent falls in the bathroom  Put nonslip strips on your bath or shower floor to prevent you from slipping   Use a bath mat if you do not have carpet in the bathroom  This will prevent you from falling when you step out of the bath or shower  Use a shower seat so you do not need to stand while you shower  Sit on the toilet or a chair in your bathroom to dry yourself and put on clothing  This will prevent you from losing your balance from drying or dressing yourself while you are standing  · Keep paths clear  Remove books, shoes, and other objects from walkways and stairs  Place cords for telephones and lamps out of the way so that you do not need to walk over them  Tape them down if you cannot move them  Remove small rugs  If you cannot remove a rug, secure it with double-sided tape  This will prevent you from tripping  · Install bright lights in your home  Use night lights to help light paths to the bathroom or kitchen  Always turn on the light before you start walking  · Keep items you use often on shelves within reach  Do not use a step stool to help you reach an item  · Paint or place reflective tape on the edges of your stairs  This will help you see the stairs better  Follow up with your healthcare provider as directed:  Write down your questions so you remember to ask them during your visits  © 2017 [address] Information is for End User's use only and may not be sold, redistributed or otherwise used for commercial purposes  All illustrations and images included in CareNotes® are the copyrighted property of A [employer] Inc  or Justen Marshall  The above information is an  only  It is not intended as medical advice for individual conditions or treatments  Talk to your doctor, nurse or pharmacist before following any medical regimen to see if it is safe and effective for you  Complete x-ray today of left hip  Use ice 15 minutes 4 times a left hip  May use Tylenol if needed  If not starting to resolve in few days call office  If not resolving in a few weeks call office  If right lower extremity is not fully resolved in 2 weeks return to office    Otherwise return in 6 months for office visit

## 2020-07-09 ENCOUNTER — APPOINTMENT (OUTPATIENT)
Dept: RADIOLOGY | Facility: MEDICAL CENTER | Age: 70
End: 2020-07-09
Payer: COMMERCIAL

## 2020-07-09 DIAGNOSIS — T14.8XXA HEMATOMA: ICD-10-CM

## 2020-07-09 DIAGNOSIS — S70.02XA CONTUSION OF LEFT HIP, INITIAL ENCOUNTER: ICD-10-CM
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
## 2020-07-09 PROCEDURE — 73502 X-RAY EXAM HIP UNI 2-3 VIEWS: CPT

## 2020-07-13 ENCOUNTER — TELEPHONE (OUTPATIENT)
Dept: UROLOGY | Facility: AMBULATORY SURGERY CENTER | Age: 70
End: 2020-07-13

## 2020-07-13 DIAGNOSIS — N40.1 BENIGN PROSTATIC HYPERPLASIA WITH NOCTURIA: Primary | ICD-10-CM

## 2020-07-13 DIAGNOSIS — R35.1 BENIGN PROSTATIC HYPERPLASIA WITH NOCTURIA: Primary | ICD-10-CM

## 2020-07-13 DIAGNOSIS — Z85.528 PERSONAL HISTORY OF KIDNEY CANCER: ICD-10-CM

## 2020-07-13 NOTE — TELEPHONE ENCOUNTER
Patient managed by [facility] is calling to say labs are not in Epic for his upcoming appointment on 7/21/20  He will need a psa and cmp ordered by urology

## 2020-07-15 ENCOUNTER — APPOINTMENT (OUTPATIENT)
Dept: LAB | Facility: CLINIC | Age: 70
End: 2020-07-15
Payer: COMMERCIAL

## 2020-07-15 DIAGNOSIS — N40.1 BENIGN PROSTATIC HYPERPLASIA WITH NOCTURIA: ICD-10-CM

## 2020-07-15 DIAGNOSIS — R35.1 BENIGN PROSTATIC HYPERPLASIA WITH NOCTURIA: ICD-10-CM

## 2020-07-15 DIAGNOSIS — Z85.528 PERSONAL HISTORY OF KIDNEY CANCER: ICD-10-CM

## 2020-07-15 LAB
ALBUMIN SERPL BCP-MCNC: 3.8 G/DL (ref 3.5–5)
ALP SERPL-CCNC: 76 U/L (ref 46–116)
ALT SERPL W P-5'-P-CCNC: 20 U/L (ref 12–78)
ANION GAP SERPL CALCULATED.3IONS-SCNC: 5 MMOL/L (ref 4–13)
AST SERPL W P-5'-P-CCNC: 13 U/L (ref 5–45)
BILIRUB SERPL-MCNC: 1.16 MG/DL (ref 0.2–1)
BUN SERPL-MCNC: 20 MG/DL (ref 5–25)
CALCIUM ALBUM COR SERPL-MCNC: 10.6 MG/DL (ref 8.3–10.1)
CALCIUM SERPL-MCNC: 10.4 MG/DL (ref 8.3–10.1)
CHLORIDE SERPL-SCNC: 107 MMOL/L (ref 100–108)
CO2 SERPL-SCNC: 28 MMOL/L (ref 21–32)
CREAT SERPL-MCNC: 1.43 MG/DL (ref 0.6–1.3)
GFR SERPL CREATININE-BSD FRML MDRD: 49 ML/MIN/1.73SQ M
GLUCOSE P FAST SERPL-MCNC: 94 MG/DL (ref 65–99)
POTASSIUM SERPL-SCNC: 4.1 MMOL/L (ref 3.5–5.3)
PROT SERPL-MCNC: 7.1 G/DL (ref 6.4–8.2)
PSA SERPL-MCNC: 0.2 NG/ML (ref 0–4)
SODIUM SERPL-SCNC: 140 MMOL/L (ref 136–145)

## 2020-07-15 PROCEDURE — 36415 COLL VENOUS BLD VENIPUNCTURE: CPT

## 2020-07-15 PROCEDURE — 84153 ASSAY OF PSA TOTAL: CPT

## 2020-07-15 PROCEDURE — 80053 COMPREHEN METABOLIC PANEL: CPT

## 2020-07-21 ENCOUNTER — OFFICE VISIT (OUTPATIENT)
Dept: UROLOGY | Facility: MEDICAL CENTER | Age: 70
End: 2020-07-21
Payer: COMMERCIAL

## 2020-07-21 VITALS
BODY MASS INDEX: 30.94 KG/M2 | DIASTOLIC BLOOD PRESSURE: 88 MMHG | WEIGHT: 262 LBS | HEIGHT: 77 IN | SYSTOLIC BLOOD PRESSURE: 148 MMHG | TEMPERATURE: 99.1 F

## 2020-07-21 DIAGNOSIS — Z85.528 PERSONAL HISTORY OF KIDNEY CANCER: Primary | ICD-10-CM

## 2020-07-21 DIAGNOSIS — N40.1 BENIGN PROSTATIC HYPERPLASIA WITH NOCTURIA: ICD-10-CM

## 2020-07-21 DIAGNOSIS — R35.1 BENIGN PROSTATIC HYPERPLASIA WITH NOCTURIA: ICD-10-CM

## 2020-07-21 PROCEDURE — 1160F RVW MEDS BY RX/DR IN RCRD: CPT | Performed by: UROLOGY

## 2020-07-21 PROCEDURE — 3008F BODY MASS INDEX DOCD: CPT | Performed by: UROLOGY

## 2020-07-21 PROCEDURE — 1170F FXNL STATUS ASSESSED: CPT | Performed by: UROLOGY

## 2020-07-21 PROCEDURE — 3077F SYST BP >= 140 MM HG: CPT | Performed by: UROLOGY

## 2020-07-21 PROCEDURE — 99214 OFFICE O/P EST MOD 30 MIN: CPT | Performed by: UROLOGY

## 2020-07-21 PROCEDURE — 1036F TOBACCO NON-USER: CPT | Performed by: UROLOGY

## 2020-07-21 PROCEDURE — 3079F DIAST BP 80-89 MM HG: CPT | Performed by: UROLOGY

## 2020-07-21 PROCEDURE — 4040F PNEUMOC VAC/ADMIN/RCVD: CPT | Performed by: UROLOGY

## 2020-07-21 RX ORDER — TAMSULOSIN HYDROCHLORIDE 0.4 MG/1
0.4 CAPSULE ORAL
Qty: 90 CAPSULE | Refills: 3 | Status: SHIPPED | OUTPATIENT
Start: 2020-07-21 | End: 2021-03-24 | Stop reason: SDUPTHER

## 2020-07-21 NOTE — ASSESSMENT & PLAN NOTE
Patient is status post right radical nephrectomy in 2006  for a T3 renal cell carcinoma  He has done well and there is never been evidence of metastatic disease  Further studies are not indicated as per NCCN guidelines  We will continue to follow clinically

## 2020-07-21 NOTE — PROGRESS NOTES
Assessment/Plan:    Benign prostatic hyperplasia with nocturia  PSA is stable at 0 2  Urinalysis is negative  AUA symptom score is 17  He is not happy with his voiding pattern  Treatment options were discussed and he wished to trial of medical therapy  Tamsulosin was prescribed  Use and side effects were discussed  At his request the patient will return in 1 year  Personal history of kidney cancer  Patient is status post right radical nephrectomy in 2006  for a T3 renal cell carcinoma  He has done well and there is never been evidence of metastatic disease  Further studies are not indicated as per NCCN guidelines  We will continue to follow clinically  Diagnoses and all orders for this visit:    Personal history of kidney cancer    Benign prostatic hyperplasia with nocturia  -     tamsulosin (FLOMAX) 0 4 mg; Take 1 capsule (0 4 mg total) by mouth daily with dinner          Subjective:      Patient ID: Jamey Home is a 78 y o  male  Benign Prostatic Hypertrophy   This is a chronic problem  The current episode started more than 1 year ago  The problem has been gradually worsening since onset  Irritative symptoms include nocturia and urgency  Irritative symptoms do not include frequency  Obstructive symptoms include incomplete emptying, an intermittent stream and a slower stream  Obstructive symptoms do not include dribbling, straining or a weak stream  Pertinent negatives include no chills, dysuria, genital pain, hematuria, hesitancy, nausea or vomiting  AUA score is 8-19  His sexual activity is non-contributory to the current illness  Nothing aggravates the symptoms  Past treatments include nothing         The following portions of the patient's history were reviewed and updated as appropriate: allergies, current medications, past family history, past medical history, past social history, past surgical history and problem list     Review of Systems   Constitutional: Negative for chills, diaphoresis, fatigue and fever  HENT: Negative  Eyes: Negative  Respiratory: Negative  Cardiovascular: Negative  Gastrointestinal: Negative for nausea and vomiting  Endocrine: Negative  Genitourinary: Positive for incomplete emptying, nocturia and urgency  Negative for dysuria, frequency, hematuria and hesitancy  See HPI   Musculoskeletal: Positive for gait problem  Hip pain   Skin: Negative  Allergic/Immunologic: Negative  Hematological: Negative  Psychiatric/Behavioral: Negative  AUA SYMPTOM SCORE      Most Recent Value   AUA SYMPTOM SCORE   How often have you had a sensation of not emptying your bladder completely after you finished urinating? 4   How often have you had to urinate again less than two hours after you finished urinating? 2   How often have you found you stopped and started again several times when you urinate? 3   How often have you found it difficult to postpone urination? 2   How often have you had a weak urinary stream?  0   How often have you had to push or strain to begin urination? 3   How many times did you most typically get up to urinate from the time you went to bed at night until the time you got up in the morning? 3   Quality of Life: If you were to spend the rest of your life with your urinary condition just the way it is now, how would you feel about that?  4   AUA SYMPTOM SCORE  17        Objective:      /88 (BP Location: Left arm, Patient Position: Sitting, Cuff Size: Adult)   Temp 99 1 °F (37 3 °C)   Ht 6' 5" (1 956 m)   Wt 119 kg (262 lb)   BMI 31 07 kg/m²          Physical Exam   Constitutional: He is oriented to person, place, and time  He appears well-developed and well-nourished  HENT:   Head: Normocephalic and atraumatic  Eyes: Conjunctivae are normal    Neck: Neck supple  Cardiovascular: Normal rate  Pulmonary/Chest: Effort normal    Abdominal: Soft   Bowel sounds are normal  He exhibits no distension and no mass  There is no tenderness  There is no rebound, no guarding and no CVA tenderness  No hernia  Genitourinary: Rectum normal, testes normal and penis normal  Right testis shows no mass  Left testis shows no mass  No phimosis or hypospadias  Genitourinary Comments: Prostate 1+ enlarged and palpably benign   Musculoskeletal: He exhibits no edema  Neurological: He is alert and oriented to person, place, and time  Skin: Skin is warm and dry  Psychiatric: He has a normal mood and affect  His behavior is normal  Judgment and thought content normal    Vitals reviewed

## 2020-07-21 NOTE — ASSESSMENT & PLAN NOTE
PSA is stable at 0 2  Urinalysis is negative  AUA symptom score is 17  He is not happy with his voiding pattern  Treatment options were discussed and he wished to trial of medical therapy  Tamsulosin was prescribed  Use and side effects were discussed  At his request the patient will return in 1 year

## 2020-07-21 NOTE — PATIENT INSTRUCTIONS
Tamsulosin (By mouth)   Tamsulosin Hydrochloride (ebi-SHJ-xnu-sin bruna-droe-KLOR-estevan)  Treats benign prostatic hyperplasia (enlarged prostate)  Brand Name(s): Flomax   There may be other brand names for this medicine  When This Medicine Should Not Be Used: This medicine is not right for everyone  Do not use it if you had an allergic reaction to tamsulosin  How to Use This Medicine:   Capsule  · Take your medicine as directed  Your dose may need to be changed several times to find what works best for you  · Take this medicine 30 minutes after the same meal each day  Swallow the capsule whole  Do not crush, chew, or open it  · Read and follow the patient instructions that come with this medicine  Talk to your doctor or pharmacist if you have any questions  · Missed dose: Take a dose as soon as you remember  If it is almost time for your next dose, wait until then and take a regular dose  Do not take extra medicine to make up for a missed dose  If you forget to take this medicine for several days in a row, talk to your doctor before you start taking it again  · Store the medicine in a closed container at room temperature, away from heat, moisture, and direct light  Drugs and Foods to Avoid:   Ask your doctor or pharmacist before using any other medicine, including over-the-counter medicines, vitamins, and herbal products  · Some medicines can affect how tamsulosin works  Tell your doctor if you are using another alpha blocker medicine, cimetidine, erythromycin, ketoconazole, paroxetine, terbinafine, warfarin, or medicine for erectile dysfunction  Warnings While Using This Medicine:   · Tell your doctor if you have kidney disease, liver disease, low blood pressure, prostate cancer, or an allergy to sulfa drugs  · Tell your doctor if you plan to have cataract or glaucoma surgery  This medicine may cause eye problems during surgery  · This medicine may make you dizzy or lightheaded   Do not drive or do anything else that could be dangerous until you know how this medicine affects you  Stand or sit up slowly if you feel dizzy  · Your doctor will check your progress and the effects of this medicine at regular visits  Keep all appointments  · Keep all medicine out of the reach of children  Never share your medicine with anyone  Possible Side Effects While Using This Medicine:   Call your doctor right away if you notice any of these side effects:  · Allergic reaction: Itching or hives, swelling in your face or hands, swelling or tingling in your mouth or throat, chest tightness, trouble breathing  · Blistering, peeling, red skin rash  · Lightheadedness, dizziness, fainting  · Painful, prolonged erection of your penis  If you notice these less serious side effects, talk with your doctor:   · Headache  · Problems with ejaculation  · Runny or stuffy nose  If you notice other side effects that you think are caused by this medicine, tell your doctor  Call your doctor for medical advice about side effects  You may report side effects to FDA at 6-841-FDA-8361  © 2017 [address] [employer] [address] Information is for End User's use only and may not be sold, redistributed or otherwise used for commercial purposes  The above information is an  only  It is not intended as medical advice for individual conditions or treatments  Talk to your doctor, nurse or pharmacist before following any medical regimen to see if it is safe and effective for you

## 2020-10-02 DIAGNOSIS — E03.9 HYPOTHYROIDISM, UNSPECIFIED TYPE: ICD-10-CM

## 2020-10-02 DIAGNOSIS — I10 ESSENTIAL HYPERTENSION: ICD-10-CM

## 2020-10-02 RX ORDER — IRBESARTAN 150 MG/1
150 TABLET ORAL
Qty: 90 TABLET | Refills: 0 | Status: SHIPPED | OUTPATIENT
Start: 2020-10-02 | End: 2020-12-30 | Stop reason: SDUPTHER

## 2020-10-02 RX ORDER — LEVOTHYROXINE SODIUM 0.03 MG/1
25 TABLET ORAL
Qty: 90 TABLET | Refills: 0 | Status: SHIPPED | OUTPATIENT
Start: 2020-10-02 | End: 2020-12-30 | Stop reason: SDUPTHER

## 2020-10-08 ENCOUNTER — OFFICE VISIT (OUTPATIENT)
Dept: FAMILY MEDICINE CLINIC | Facility: CLINIC | Age: 70
End: 2020-10-08
Payer: COMMERCIAL

## 2020-10-08 VITALS
BODY MASS INDEX: 30.98 KG/M2 | TEMPERATURE: 98 F | SYSTOLIC BLOOD PRESSURE: 138 MMHG | WEIGHT: 262.38 LBS | DIASTOLIC BLOOD PRESSURE: 78 MMHG | HEIGHT: 77 IN | HEART RATE: 76 BPM

## 2020-10-08 DIAGNOSIS — N18.31 STAGE 3A CHRONIC KIDNEY DISEASE (HCC): Primary | ICD-10-CM

## 2020-10-08 DIAGNOSIS — C64.1 MALIGNANT NEOPLASM OF RIGHT KIDNEY, EXCEPT RENAL PELVIS (HCC): ICD-10-CM

## 2020-10-08 DIAGNOSIS — M15.9 OSTEOARTHRITIS OF MULTIPLE JOINTS, UNSPECIFIED OSTEOARTHRITIS TYPE: ICD-10-CM

## 2020-10-08 DIAGNOSIS — I10 ESSENTIAL HYPERTENSION: ICD-10-CM

## 2020-10-08 DIAGNOSIS — Z00.00 HEALTH CARE MAINTENANCE: ICD-10-CM

## 2020-10-08 DIAGNOSIS — S81.802D OPEN WOUND OF LEFT LOWER LEG, SUBSEQUENT ENCOUNTER: ICD-10-CM

## 2020-10-08 PROCEDURE — 90662 IIV NO PRSV INCREASED AG IM: CPT

## 2020-10-08 PROCEDURE — G0008 ADMIN INFLUENZA VIRUS VAC: HCPCS

## 2020-10-08 PROCEDURE — 99214 OFFICE O/P EST MOD 30 MIN: CPT

## 2020-10-15 ENCOUNTER — OFFICE VISIT (OUTPATIENT)
Dept: WOUND CARE | Facility: HOSPITAL | Age: 70
End: 2020-10-15
Payer: COMMERCIAL

## 2020-10-15 VITALS
WEIGHT: 260 LBS | TEMPERATURE: 98 F | HEART RATE: 60 BPM | RESPIRATION RATE: 18 BRPM | BODY MASS INDEX: 30.7 KG/M2 | HEIGHT: 77 IN | DIASTOLIC BLOOD PRESSURE: 64 MMHG | SYSTOLIC BLOOD PRESSURE: 136 MMHG

## 2020-10-15 DIAGNOSIS — E66.9 OBESITY (BMI 30-39.9): ICD-10-CM

## 2020-10-15 DIAGNOSIS — S81.801A OPEN WOUND OF RIGHT LOWER EXTREMITY, INITIAL ENCOUNTER: Primary | ICD-10-CM

## 2020-10-15 DIAGNOSIS — R26.9 GAIT DISTURBANCE: ICD-10-CM

## 2020-10-15 DIAGNOSIS — L97.912 NON-PRESSURE CHRONIC ULCER OF RIGHT LOWER LEG WITH FAT LAYER EXPOSED (HCC): ICD-10-CM

## 2020-10-15 PROCEDURE — 97597 DBRDMT OPN WND 1ST 20 CM/<: CPT | Performed by: NURSE PRACTITIONER

## 2020-10-15 PROCEDURE — 99213 OFFICE O/P EST LOW 20 MIN: CPT | Performed by: NURSE PRACTITIONER

## 2020-10-15 PROCEDURE — 99203 OFFICE O/P NEW LOW 30 MIN: CPT | Performed by: NURSE PRACTITIONER

## 2020-10-15 RX ORDER — LIDOCAINE HYDROCHLORIDE 40 MG/ML
1 SOLUTION TOPICAL ONCE
Status: COMPLETED | OUTPATIENT
Start: 2020-10-15 | End: 2020-10-15

## 2020-10-15 RX ADMIN — LIDOCAINE HYDROCHLORIDE 1 ML: 40 SOLUTION TOPICAL at 14:17

## 2020-10-21 ENCOUNTER — OFFICE VISIT (OUTPATIENT)
Dept: WOUND CARE | Facility: HOSPITAL | Age: 70
End: 2020-10-21
Payer: COMMERCIAL

## 2020-10-21 VITALS
RESPIRATION RATE: 20 BRPM | TEMPERATURE: 97.8 F | HEART RATE: 60 BPM | SYSTOLIC BLOOD PRESSURE: 128 MMHG | DIASTOLIC BLOOD PRESSURE: 88 MMHG

## 2020-10-21 DIAGNOSIS — S81.801A OPEN WOUND OF RIGHT LOWER EXTREMITY, INITIAL ENCOUNTER: Primary | ICD-10-CM

## 2020-10-21 PROCEDURE — 11045 DBRDMT SUBQ TISS EACH ADDL: CPT | Performed by: PREVENTIVE MEDICINE

## 2020-10-21 PROCEDURE — 11042 DBRDMT SUBQ TIS 1ST 20SQCM/<: CPT | Performed by: PREVENTIVE MEDICINE

## 2020-10-21 RX ORDER — LIDOCAINE HYDROCHLORIDE 40 MG/ML
1 SOLUTION TOPICAL ONCE
Status: COMPLETED | OUTPATIENT
Start: 2020-10-21 | End: 2020-10-21

## 2020-10-21 RX ADMIN — LIDOCAINE HYDROCHLORIDE 1 ML: 40 SOLUTION TOPICAL at 11:20

## 2020-10-28 ENCOUNTER — OFFICE VISIT (OUTPATIENT)
Dept: WOUND CARE | Facility: HOSPITAL | Age: 70
End: 2020-10-28
Payer: COMMERCIAL

## 2020-10-28 VITALS
TEMPERATURE: 98.1 F | HEART RATE: 76 BPM | RESPIRATION RATE: 18 BRPM | DIASTOLIC BLOOD PRESSURE: 78 MMHG | SYSTOLIC BLOOD PRESSURE: 126 MMHG

## 2020-10-28 DIAGNOSIS — E66.9 OBESITY (BMI 30-39.9): ICD-10-CM

## 2020-10-28 DIAGNOSIS — S81.801A OPEN WOUND OF RIGHT LOWER EXTREMITY, INITIAL ENCOUNTER: Primary | ICD-10-CM

## 2020-10-28 DIAGNOSIS — R60.0 LEG EDEMA, RIGHT: ICD-10-CM

## 2020-10-28 PROCEDURE — 11042 DBRDMT SUBQ TIS 1ST 20SQCM/<: CPT | Performed by: PREVENTIVE MEDICINE

## 2020-10-28 RX ORDER — LIDOCAINE HYDROCHLORIDE 40 MG/ML
5 SOLUTION TOPICAL ONCE
Status: COMPLETED | OUTPATIENT
Start: 2020-10-28 | End: 2020-10-28

## 2020-10-28 RX ADMIN — LIDOCAINE HYDROCHLORIDE 5 ML: 40 SOLUTION TOPICAL at 11:12

## 2020-11-04 ENCOUNTER — OFFICE VISIT (OUTPATIENT)
Dept: WOUND CARE | Facility: HOSPITAL | Age: 70
End: 2020-11-04
Payer: COMMERCIAL

## 2020-11-04 VITALS
HEART RATE: 76 BPM | SYSTOLIC BLOOD PRESSURE: 118 MMHG | RESPIRATION RATE: 18 BRPM | DIASTOLIC BLOOD PRESSURE: 76 MMHG | TEMPERATURE: 97.6 F

## 2020-11-04 DIAGNOSIS — S81.801A OPEN WOUND OF RIGHT LOWER EXTREMITY, INITIAL ENCOUNTER: Primary | ICD-10-CM

## 2020-11-04 DIAGNOSIS — L97.912 NON-PRESSURE CHRONIC ULCER OF RIGHT LOWER LEG WITH FAT LAYER EXPOSED (HCC): ICD-10-CM

## 2020-11-04 DIAGNOSIS — R60.0 LEG EDEMA, RIGHT: ICD-10-CM

## 2020-11-04 PROCEDURE — 11042 DBRDMT SUBQ TIS 1ST 20SQCM/<: CPT | Performed by: FAMILY MEDICINE

## 2020-11-04 RX ORDER — LIDOCAINE 40 MG/G
CREAM TOPICAL ONCE
Status: COMPLETED | OUTPATIENT
Start: 2020-11-04 | End: 2020-11-04

## 2020-11-04 RX ADMIN — LIDOCAINE: 40 CREAM TOPICAL at 11:11

## 2020-11-11 ENCOUNTER — OFFICE VISIT (OUTPATIENT)
Dept: WOUND CARE | Facility: HOSPITAL | Age: 70
End: 2020-11-11
Payer: COMMERCIAL

## 2020-11-11 VITALS
TEMPERATURE: 97.9 F | RESPIRATION RATE: 18 BRPM | DIASTOLIC BLOOD PRESSURE: 86 MMHG | SYSTOLIC BLOOD PRESSURE: 146 MMHG | HEART RATE: 82 BPM

## 2020-11-11 DIAGNOSIS — S81.801A OPEN WOUND OF RIGHT LOWER EXTREMITY, INITIAL ENCOUNTER: Primary | ICD-10-CM

## 2020-11-11 PROCEDURE — 29581 APPL MULTLAYER CMPRN SYS LEG: CPT

## 2020-11-11 PROCEDURE — 99213 OFFICE O/P EST LOW 20 MIN: CPT | Performed by: FAMILY MEDICINE

## 2020-11-11 RX ORDER — LIDOCAINE 40 MG/G
CREAM TOPICAL ONCE
Status: COMPLETED | OUTPATIENT
Start: 2020-11-11 | End: 2020-11-11

## 2020-11-11 RX ADMIN — LIDOCAINE 1 APPLICATION: 40 CREAM TOPICAL at 09:06

## 2020-11-18 ENCOUNTER — OFFICE VISIT (OUTPATIENT)
Dept: WOUND CARE | Facility: HOSPITAL | Age: 70
End: 2020-11-18
Payer: COMMERCIAL

## 2020-11-18 VITALS
HEART RATE: 63 BPM | DIASTOLIC BLOOD PRESSURE: 82 MMHG | TEMPERATURE: 98.6 F | SYSTOLIC BLOOD PRESSURE: 122 MMHG | RESPIRATION RATE: 18 BRPM

## 2020-11-18 DIAGNOSIS — S81.801A OPEN WOUND OF RIGHT LOWER EXTREMITY, INITIAL ENCOUNTER: Primary | ICD-10-CM

## 2020-11-18 PROCEDURE — 99213 OFFICE O/P EST LOW 20 MIN: CPT | Performed by: FAMILY MEDICINE

## 2020-11-18 PROCEDURE — 99212 OFFICE O/P EST SF 10 MIN: CPT | Performed by: FAMILY MEDICINE

## 2020-11-18 RX ORDER — LIDOCAINE 40 MG/G
CREAM TOPICAL ONCE
Status: COMPLETED | OUTPATIENT
Start: 2020-11-18 | End: 2020-11-18

## 2020-11-18 RX ADMIN — LIDOCAINE 1 APPLICATION: 40 CREAM TOPICAL at 10:55

## 2020-11-27 ENCOUNTER — OFFICE VISIT (OUTPATIENT)
Dept: WOUND CARE | Facility: HOSPITAL | Age: 70
End: 2020-11-27
Payer: COMMERCIAL

## 2020-11-27 VITALS
TEMPERATURE: 98 F | DIASTOLIC BLOOD PRESSURE: 80 MMHG | RESPIRATION RATE: 20 BRPM | SYSTOLIC BLOOD PRESSURE: 120 MMHG | HEART RATE: 62 BPM

## 2020-11-27 DIAGNOSIS — R60.0 LEG EDEMA, RIGHT: ICD-10-CM

## 2020-11-27 DIAGNOSIS — S81.801A OPEN WOUND OF RIGHT LOWER EXTREMITY, INITIAL ENCOUNTER: Primary | ICD-10-CM

## 2020-11-27 PROCEDURE — 99213 OFFICE O/P EST LOW 20 MIN: CPT | Performed by: FAMILY MEDICINE

## 2020-11-27 PROCEDURE — 99212 OFFICE O/P EST SF 10 MIN: CPT | Performed by: FAMILY MEDICINE

## 2020-12-30 DIAGNOSIS — E03.9 HYPOTHYROIDISM, UNSPECIFIED TYPE: ICD-10-CM

## 2020-12-30 DIAGNOSIS — I10 ESSENTIAL HYPERTENSION: ICD-10-CM

## 2020-12-30 RX ORDER — LEVOTHYROXINE SODIUM 0.03 MG/1
25 TABLET ORAL
Qty: 90 TABLET | Refills: 0 | Status: SHIPPED | OUTPATIENT
Start: 2020-12-30 | End: 2021-01-13 | Stop reason: SDUPTHER

## 2020-12-30 RX ORDER — IRBESARTAN 150 MG/1
150 TABLET ORAL
Qty: 90 TABLET | Refills: 0 | Status: SHIPPED | OUTPATIENT
Start: 2020-12-30 | End: 2021-01-13 | Stop reason: SDUPTHER

## 2021-01-13 DIAGNOSIS — E03.9 HYPOTHYROIDISM, UNSPECIFIED TYPE: ICD-10-CM

## 2021-01-13 DIAGNOSIS — I10 ESSENTIAL HYPERTENSION: ICD-10-CM

## 2021-01-13 RX ORDER — LEVOTHYROXINE SODIUM 0.03 MG/1
25 TABLET ORAL
Qty: 14 TABLET | Refills: 0 | Status: SHIPPED | OUTPATIENT
Start: 2021-01-13 | End: 2021-03-24 | Stop reason: SDUPTHER

## 2021-01-13 RX ORDER — IRBESARTAN 150 MG/1
150 TABLET ORAL
Qty: 14 TABLET | Refills: 0 | Status: SHIPPED | OUTPATIENT
Start: 2021-01-13 | End: 2021-03-24 | Stop reason: SDUPTHER

## 2021-03-10 DIAGNOSIS — Z23 ENCOUNTER FOR IMMUNIZATION: ICD-10-CM

## 2021-03-17 ENCOUNTER — RA CDI HCC (OUTPATIENT)
Dept: OTHER | Facility: HOSPITAL | Age: 71
End: 2021-03-17

## 2021-03-17 NOTE — PROGRESS NOTES
[facility]  coding oppertunities             Chart reviewed, (number of) suggestions sent to provider: 1                 N18 31 [facility] kidney disease stage 3a ([facility] )    Z85 52 Personal history of malignant neoplasm of kidney     Z90 5 Acquired absence of kidney      If this is correct, please document and assess at your next visit 3/24/21

## 2021-03-18 ENCOUNTER — APPOINTMENT (OUTPATIENT)
Dept: LAB | Facility: CLINIC | Age: 71
End: 2021-03-18
Payer: COMMERCIAL

## 2021-03-18 DIAGNOSIS — E03.9 HYPOTHYROIDISM, UNSPECIFIED TYPE: ICD-10-CM

## 2021-03-18 DIAGNOSIS — N18.30 CHRONIC KIDNEY DISEASE, STAGE 3 (HCC): ICD-10-CM

## 2021-03-18 DIAGNOSIS — N40.1 BENIGN PROSTATIC HYPERPLASIA WITH NOCTURIA: ICD-10-CM

## 2021-03-18 DIAGNOSIS — T14.8XXA HEMATOMA: ICD-10-CM

## 2021-03-18 DIAGNOSIS — R73.9 HYPERGLYCEMIA: ICD-10-CM

## 2021-03-18 DIAGNOSIS — E78.5 HYPERLIPIDEMIA, UNSPECIFIED HYPERLIPIDEMIA TYPE: ICD-10-CM

## 2021-03-18 DIAGNOSIS — R35.1 BENIGN PROSTATIC HYPERPLASIA WITH NOCTURIA: ICD-10-CM

## 2021-03-18 DIAGNOSIS — C64.1 MALIGNANT NEOPLASM OF RIGHT KIDNEY, EXCEPT RENAL PELVIS (HCC): ICD-10-CM

## 2021-03-18 LAB
ALBUMIN SERPL BCP-MCNC: 3.9 G/DL (ref 3.5–5)
ALP SERPL-CCNC: 86 U/L (ref 46–116)
ALT SERPL W P-5'-P-CCNC: 26 U/L (ref 12–78)
ANION GAP SERPL CALCULATED.3IONS-SCNC: 2 MMOL/L (ref 4–13)
AST SERPL W P-5'-P-CCNC: 12 U/L (ref 5–45)
BILIRUB SERPL-MCNC: 1.08 MG/DL (ref 0.2–1)
BILIRUB UR QL STRIP: NEGATIVE
BUN SERPL-MCNC: 24 MG/DL (ref 5–25)
CALCIUM SERPL-MCNC: 10 MG/DL (ref 8.3–10.1)
CHLORIDE SERPL-SCNC: 111 MMOL/L (ref 100–108)
CHOLEST SERPL-MCNC: 205 MG/DL (ref 50–200)
CLARITY UR: CLEAR
CO2 SERPL-SCNC: 29 MMOL/L (ref 21–32)
COLOR UR: YELLOW
CREAT SERPL-MCNC: 1.51 MG/DL (ref 0.6–1.3)
ERYTHROCYTE [DISTWIDTH] IN BLOOD BY AUTOMATED COUNT: 12.4 % (ref 11.6–15.1)
GFR SERPL CREATININE-BSD FRML MDRD: 46 ML/MIN/1.73SQ M
GLUCOSE P FAST SERPL-MCNC: 108 MG/DL (ref 65–99)
GLUCOSE UR STRIP-MCNC: NEGATIVE MG/DL
HCT VFR BLD AUTO: 42.9 % (ref 36.5–49.3)
HDLC SERPL-MCNC: 39 MG/DL
HGB BLD-MCNC: 13.8 G/DL (ref 12–17)
HGB UR QL STRIP.AUTO: NEGATIVE
KETONES UR STRIP-MCNC: NEGATIVE MG/DL
LDLC SERPL CALC-MCNC: 120 MG/DL (ref 0–100)
LEUKOCYTE ESTERASE UR QL STRIP: NEGATIVE
MCH RBC QN AUTO: 30.1 PG (ref 26.8–34.3)
MCHC RBC AUTO-ENTMCNC: 32.2 G/DL (ref 31.4–37.4)
MCV RBC AUTO: 94 FL (ref 82–98)
NITRITE UR QL STRIP: NEGATIVE
PH UR STRIP.AUTO: 6.5 [PH]
PLATELET # BLD AUTO: 320 THOUSANDS/UL (ref 149–390)
PMV BLD AUTO: 9.4 FL (ref 8.9–12.7)
POTASSIUM SERPL-SCNC: 4.3 MMOL/L (ref 3.5–5.3)
PROT SERPL-MCNC: 7.1 G/DL (ref 6.4–8.2)
PROT UR STRIP-MCNC: NEGATIVE MG/DL
RBC # BLD AUTO: 4.58 MILLION/UL (ref 3.88–5.62)
SODIUM SERPL-SCNC: 142 MMOL/L (ref 136–145)
SP GR UR STRIP.AUTO: 1.02 (ref 1–1.03)
T4 SERPL-MCNC: 8.9 UG/DL (ref 4.7–13.3)
TRIGL SERPL-MCNC: 230 MG/DL
TSH SERPL DL<=0.05 MIU/L-ACNC: 2.37 UIU/ML (ref 0.36–3.74)
UROBILINOGEN UR QL STRIP.AUTO: 0.2 E.U./DL
WBC # BLD AUTO: 9.07 THOUSAND/UL (ref 4.31–10.16)

## 2021-03-18 PROCEDURE — 36415 COLL VENOUS BLD VENIPUNCTURE: CPT

## 2021-03-18 PROCEDURE — 81003 URINALYSIS AUTO W/O SCOPE: CPT

## 2021-03-18 PROCEDURE — 84436 ASSAY OF TOTAL THYROXINE: CPT

## 2021-03-18 PROCEDURE — 83036 HEMOGLOBIN GLYCOSYLATED A1C: CPT

## 2021-03-18 PROCEDURE — 80053 COMPREHEN METABOLIC PANEL: CPT

## 2021-03-18 PROCEDURE — 80061 LIPID PANEL: CPT

## 2021-03-18 PROCEDURE — 85027 COMPLETE CBC AUTOMATED: CPT

## 2021-03-18 PROCEDURE — 84443 ASSAY THYROID STIM HORMONE: CPT

## 2021-03-19 LAB
EST. AVERAGE GLUCOSE BLD GHB EST-MCNC: 108 MG/DL
HBA1C MFR BLD: 5.4 %

## 2021-03-22 RX ORDER — ASPIRIN 81 MG/1
81 TABLET ORAL 2 TIMES DAILY
COMMUNITY
Start: 2021-01-08 | End: 2021-03-22

## 2021-03-23 NOTE — PROGRESS NOTES
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 [facility] doing well following with Orthopedics  2  Hypertension, stable continue present therapy  3  Hypothyroidism, stable continue present therapy  5  BPH, stable on Flomax follows with Urology  4  BMI 44 74 diet exercise weight loss recommended  6  Hyperglycemia diet controlled  7  Hyperlipidemia diet controlled but is elevated  Diet exercise is discussed  8  Right kidney cancer status post nephrectomy follows with Urology  9  CKD - 3, stable on Arb therapy continue present therapy  10  Abdominal aortic aneurysm, dilation seen on x-ray ultrasound is ordered  11  Hepatitis-C screening discussed order placed  12   Patient to return in 6 months for office visit blood work sooner if needed    Problem List Items Addressed This Visit        Endocrine    Acquired hypothyroidism    Relevant Medications    levothyroxine 25 mcg tablet    Other Relevant Orders    CBC    Comprehensive metabolic panel    Hemoglobin A1C    Lipid Panel with Direct LDL reflex    TSH, 3rd generation with Free T4 reflex    UA (URINE) with reflex to Scope    T4       Cardiovascular and Mediastinum    Essential hypertension      Stable continue present therapy         Relevant Medications    irbesartan (AVAPRO) 150 mg tablet    Other Relevant Orders    CBC    Comprehensive metabolic panel    Hemoglobin A1C    Lipid Panel with Direct LDL reflex    TSH, 3rd generation with Free T4 reflex    UA (URINE) with reflex to Scope    T4    Abdominal aortic aneurysm (AAA) without rupture ([facility] )      Ultrasound abdominal aorta ordered         Relevant Orders    US abdominal aorta    CBC    Comprehensive metabolic panel    Hemoglobin A1C    Lipid Panel with Direct LDL reflex    TSH, 3rd generation with Free T4 reflex    UA (URINE) with reflex to Scope    T4       Musculoskeletal and Integument    Osteoarthritis - Primary      Status post right total hip replacement 1/21 at [facility]         Relevant Orders    CBC    Comprehensive metabolic panel    Hemoglobin A1C    Lipid Panel with Direct LDL reflex    TSH, 3rd generation with Free T4 reflex    UA (URINE) with reflex to Scope    T4       Genitourinary    Malignant neoplasm of right kidney, except renal pelvis ([address] )      Follows with urology         Relevant Orders    CBC    Comprehensive metabolic panel    Hemoglobin A1C    Lipid Panel with Direct LDL reflex    TSH, 3rd generation with Free T4 reflex    UA (URINE) with reflex to Scope    T4       Other    Benign prostatic hyperplasia with nocturia      Stable continue Flomax follows with Urology         Relevant Medications    tamsulosin (FLOMAX) 0 4 mg    Other Relevant Orders    CBC    Comprehensive metabolic panel    Hemoglobin A1C    Lipid Panel with Direct LDL reflex    TSH, 3rd generation with Free T4 reflex    UA (URINE) with reflex to Scope    T4    Hyperglycemia      Diet controlled         Relevant Orders    CBC    Comprehensive metabolic panel    Hemoglobin A1C    Lipid Panel with Direct LDL reflex    TSH, 3rd generation with Free T4 reflex    UA (URINE) with reflex to Scope    T4    Mixed hyperlipidemia      Diet controlled low-fat diet recommended patient was unable to exercise secondary to hip replacement and COVID    Patient will restart exercising and gym         Relevant Orders    CBC    Comprehensive metabolic panel    Hemoglobin A1C    Lipid Panel with Direct LDL reflex    TSH, 3rd generation with Free T4 reflex    UA (URINE) with reflex to Scope    T4    Morbid obesity with BMI of 40 0-44 9, adult (HCC)      BMI 44 74 diet exercise weight loss recommended         Relevant Orders    CBC    Comprehensive metabolic panel    Hemoglobin A1C    Lipid Panel with Direct LDL reflex    TSH, 3rd generation with Free T4 reflex    UA (URINE) with reflex to Scope    T4      Other Visit Diagnoses     Encounter for hepatitis C screening test for low risk patient Relevant Orders    Hepatitis C antibody                 Diagnoses and all orders for this visit:    Osteoarthritis of multiple joints, unspecified osteoarthritis type  -     CBC; Future  -     Comprehensive metabolic panel; Future  -     Hemoglobin A1C; Future  -     Lipid Panel with Direct LDL reflex; Future  -     TSH, 3rd generation with Free T4 reflex; Future  -     UA (URINE) with reflex to Scope; Future  -     T4; Future    Hypothyroidism, unspecified type  -     levothyroxine 25 mcg tablet; Take 1 tablet (25 mcg total) by mouth daily in the early morning  -     CBC; Future  -     Comprehensive metabolic panel; Future  -     Hemoglobin A1C; Future  -     Lipid Panel with Direct LDL reflex; Future  -     TSH, 3rd generation with Free T4 reflex; Future  -     UA (URINE) with reflex to Scope; Future  -     T4; Future    Essential hypertension  -     irbesartan (AVAPRO) 150 mg tablet; Take 1 tablet (150 mg total) by mouth daily at bedtime  -     CBC; Future  -     Comprehensive metabolic panel; Future  -     Hemoglobin A1C; Future  -     Lipid Panel with Direct LDL reflex; Future  -     TSH, 3rd generation with Free T4 reflex; Future  -     UA (URINE) with reflex to Scope; Future  -     T4; Future    Acquired hypothyroidism  -     CBC; Future  -     Comprehensive metabolic panel; Future  -     Hemoglobin A1C; Future  -     Lipid Panel with Direct LDL reflex; Future  -     TSH, 3rd generation with Free T4 reflex; Future  -     UA (URINE) with reflex to Scope; Future  -     T4; Future    Benign prostatic hyperplasia with nocturia  -     tamsulosin (FLOMAX) 0 4 mg; Take 1 capsule (0 4 mg total) by mouth daily with dinner  -     CBC; Future  -     Comprehensive metabolic panel; Future  -     Hemoglobin A1C; Future  -     Lipid Panel with Direct LDL reflex; Future  -     TSH, 3rd generation with Free T4 reflex; Future  -     UA (URINE) with reflex to Scope;  Future  -     T4; Future    Malignant neoplasm of right kidney, except renal pelvis (HCC)  -     CBC; Future  -     Comprehensive metabolic panel; Future  -     Hemoglobin A1C; Future  -     Lipid Panel with Direct LDL reflex; Future  -     TSH, 3rd generation with Free T4 reflex; Future  -     UA (URINE) with reflex to Scope; Future  -     T4; Future    Hyperglycemia  -     CBC; Future  -     Comprehensive metabolic panel; Future  -     Hemoglobin A1C; Future  -     Lipid Panel with Direct LDL reflex; Future  -     TSH, 3rd generation with Free T4 reflex; Future  -     UA (URINE) with reflex to Scope; Future  -     T4; Future    Mixed hyperlipidemia  -     CBC; Future  -     Comprehensive metabolic panel; Future  -     Hemoglobin A1C; Future  -     Lipid Panel with Direct LDL reflex; Future  -     TSH, 3rd generation with Free T4 reflex; Future  -     UA (URINE) with reflex to Scope; Future  -     T4; Future    Morbid obesity with BMI of 40 0-44 9, adult (HCC)  -     CBC; Future  -     Comprehensive metabolic panel; Future  -     Hemoglobin A1C; Future  -     Lipid Panel with Direct LDL reflex; Future  -     TSH, 3rd generation with Free T4 reflex; Future  -     UA (URINE) with reflex to Scope; Future  -     T4; Future    Abdominal aortic aneurysm (AAA) without rupture ([facility] [address] )  -     US abdominal aorta; Future  -     CBC; Future  -     Comprehensive metabolic panel; Future  -     Hemoglobin A1C; Future  -     Lipid Panel with Direct LDL reflex; Future  -     TSH, 3rd generation with Free T4 reflex; Future  -     UA (URINE) with reflex to Scope; Future  -     T4; Future    Encounter for hepatitis C screening test for low risk patient  -     Hepatitis C antibody; Future              Subjective:      Patient ID: Clemente Metcalf is a 69 y o  male  CC:    Chief Complaint   Patient presents with    Follow-up     review labs done 3/18/21    Hyperglycemia       HPI:     Patient doing well  Patient had a right total hip replacement in January doing fine with that    However upon the workup on I a lumbar spine x-ray at [facility] there is a question of abdominal aortic aneurysm  His ortho pot is recommending an ultrasound of abdominal aorta  I will order this  Blood work was reviewed with the patient      The following portions of the patient's history were reviewed and updated as appropriate: allergies, current medications, past family history, past medical history, past social history, past surgical history and problem list       Review of Systems   Constitutional: Negative  HENT: Negative  Eyes: Negative  Respiratory: Negative  Cardiovascular:        HPI   Gastrointestinal: Negative  Endocrine: Negative  Genitourinary: Negative  Musculoskeletal:        HPI   Skin: Negative  Allergic/Immunologic: Negative  Neurological: Negative  Hematological: Negative  Psychiatric/Behavioral: Negative  Data to review:       Objective:    Vitals:    03/24/21 0816   BP: 136/80   BP Location: Left arm   Patient Position: Sitting   Cuff Size: Adult   Pulse: 100   Temp: 98 1 °F (36 7 °C)   TempSrc: Temporal   Weight: 124 kg (273 lb)   Height: 5' 5 5" (1 664 m)        Physical Exam  Vitals signs and nursing note reviewed  Constitutional:       Appearance: Normal appearance  HENT:      Head: Normocephalic and atraumatic  Eyes:      General: No scleral icterus  Neck:      Musculoskeletal: Neck supple  Vascular: No carotid bruit  Cardiovascular:      Rate and Rhythm: Normal rate and regular rhythm  Heart sounds: Normal heart sounds  Pulmonary:      Effort: Pulmonary effort is normal       Breath sounds: Normal breath sounds  Abdominal:      General: Bowel sounds are normal       Palpations: Abdomen is soft  Tenderness: There is no abdominal tenderness  Musculoskeletal:      Right lower leg: No edema  Left lower leg: No edema  Skin:     General: Skin is warm and dry  Neurological:      General: No focal deficit present  Mental Status: He is alert  Psychiatric:         Mood and Affect: Mood normal          BMI Counseling: Body mass index is 44 74 kg/m²  The BMI is above normal  Nutrition recommendations include moderation in carbohydrate intake and reducing intake of cholesterol  Exercise recommendations include exercising 3-5 times per week  BMI Counseling: Body mass index is 44 74 kg/m²  The BMI is above normal  Nutrition recommendations include moderation in carbohydrate intake and reducing intake of cholesterol  Exercise recommendations include exercising 3-5 times per week

## 2021-03-24 ENCOUNTER — OFFICE VISIT (OUTPATIENT)
Dept: FAMILY MEDICINE CLINIC | Facility: CLINIC | Age: 71
End: 2021-03-24
Payer: COMMERCIAL

## 2021-03-24 VITALS
SYSTOLIC BLOOD PRESSURE: 136 MMHG | BODY MASS INDEX: 43.87 KG/M2 | TEMPERATURE: 98.1 F | HEART RATE: 100 BPM | WEIGHT: 273 LBS | HEIGHT: 66 IN | DIASTOLIC BLOOD PRESSURE: 80 MMHG

## 2021-03-24 DIAGNOSIS — E78.2 MIXED HYPERLIPIDEMIA: ICD-10-CM

## 2021-03-24 DIAGNOSIS — I71.4 ABDOMINAL AORTIC ANEURYSM (AAA) WITHOUT RUPTURE (HCC): ICD-10-CM

## 2021-03-24 DIAGNOSIS — N40.1 BENIGN PROSTATIC HYPERPLASIA WITH NOCTURIA: ICD-10-CM

## 2021-03-24 DIAGNOSIS — R73.9 HYPERGLYCEMIA: ICD-10-CM

## 2021-03-24 DIAGNOSIS — C64.1 MALIGNANT NEOPLASM OF RIGHT KIDNEY, EXCEPT RENAL PELVIS (HCC): ICD-10-CM

## 2021-03-24 DIAGNOSIS — M15.9 OSTEOARTHRITIS OF MULTIPLE JOINTS, UNSPECIFIED OSTEOARTHRITIS TYPE: Primary | ICD-10-CM

## 2021-03-24 DIAGNOSIS — R35.1 BENIGN PROSTATIC HYPERPLASIA WITH NOCTURIA: ICD-10-CM

## 2021-03-24 DIAGNOSIS — E03.9 HYPOTHYROIDISM, UNSPECIFIED TYPE: ICD-10-CM

## 2021-03-24 DIAGNOSIS — E03.9 ACQUIRED HYPOTHYROIDISM: ICD-10-CM

## 2021-03-24 DIAGNOSIS — Z11.59 ENCOUNTER FOR HEPATITIS C SCREENING TEST FOR LOW RISK PATIENT: ICD-10-CM

## 2021-03-24 DIAGNOSIS — I10 ESSENTIAL HYPERTENSION: ICD-10-CM

## 2021-03-24 DIAGNOSIS — E66.01 MORBID OBESITY WITH BMI OF 40.0-44.9, ADULT (HCC): ICD-10-CM

## 2021-03-24 PROBLEM — I71.40 ABDOMINAL AORTIC ANEURYSM (AAA) WITHOUT RUPTURE: Status: ACTIVE | Noted: 2021-03-24

## 2021-03-24 PROBLEM — S81.801A OPEN WOUND OF RIGHT LOWER EXTREMITY: Status: RESOLVED | Noted: 2020-11-27 | Resolved: 2021-03-24

## 2021-03-24 PROBLEM — E66.9 OBESITY (BMI 30-39.9): Status: RESOLVED | Noted: 2019-10-22 | Resolved: 2021-03-24

## 2021-03-24 PROCEDURE — 3725F SCREEN DEPRESSION PERFORMED: CPT | Performed by: FAMILY MEDICINE

## 2021-03-24 PROCEDURE — 3008F BODY MASS INDEX DOCD: CPT | Performed by: FAMILY MEDICINE

## 2021-03-24 PROCEDURE — 1160F RVW MEDS BY RX/DR IN RCRD: CPT | Performed by: FAMILY MEDICINE

## 2021-03-24 PROCEDURE — 99214 OFFICE O/P EST MOD 30 MIN: CPT | Performed by: FAMILY MEDICINE

## 2021-03-24 PROCEDURE — 3079F DIAST BP 80-89 MM HG: CPT | Performed by: FAMILY MEDICINE

## 2021-03-24 PROCEDURE — 3075F SYST BP GE 130 - 139MM HG: CPT | Performed by: FAMILY MEDICINE

## 2021-03-24 PROCEDURE — 1036F TOBACCO NON-USER: CPT | Performed by: FAMILY MEDICINE

## 2021-03-24 RX ORDER — TAMSULOSIN HYDROCHLORIDE 0.4 MG/1
0.4 CAPSULE ORAL
Qty: 90 CAPSULE | Refills: 3
Start: 2021-03-24 | End: 2021-07-26 | Stop reason: SDUPTHER

## 2021-03-24 RX ORDER — IRBESARTAN 150 MG/1
150 TABLET ORAL
Qty: 90 TABLET | Refills: 3 | Status: SHIPPED | OUTPATIENT
Start: 2021-03-24 | End: 2022-04-11 | Stop reason: SDUPTHER

## 2021-03-24 RX ORDER — LEVOTHYROXINE SODIUM 0.03 MG/1
25 TABLET ORAL
Qty: 90 TABLET | Refills: 3 | Status: SHIPPED | OUTPATIENT
Start: 2021-03-24 | End: 2022-04-11 | Stop reason: SDUPTHER

## 2021-03-24 NOTE — ASSESSMENT & PLAN NOTE
Diet controlled low-fat diet recommended patient was unable to exercise secondary to hip replacement and COVID    Patient will restart exercising and gym

## 2021-03-24 NOTE — ASSESSMENT & PLAN NOTE
Status post right total hip replacement 1/21 at [facility] DISPLAY PLAN FREE TEXT

## 2021-03-24 NOTE — PATIENT INSTRUCTIONS
Diet exercise weight loss recommended  Follow all specialist per their instructions   Return in 6 months for office visit blood work sooner if needed

## 2021-03-24 NOTE — ASSESSMENT & PLAN NOTE
Lab Results   Component Value Date    EGFR 46 03/18/2021    EGFR 49 07/15/2020    EGFR 45 07/02/2020    CREATININE 1 51 (H) 03/18/2021    CREATININE 1 43 (H) 07/15/2020    CREATININE 1 54 (H) 07/02/2020    stable, Arb therapy, Avapro

## 2021-03-25 ENCOUNTER — HOSPITAL ENCOUNTER (OUTPATIENT)
Dept: ULTRASOUND IMAGING | Facility: HOSPITAL | Age: 71
Discharge: HOME | End: 2021-03-25
Payer: COMMERCIAL

## 2021-03-25 DIAGNOSIS — I71.4 ABDOMINAL AORTIC ANEURYSM (AAA) WITHOUT RUPTURE (HCC): ICD-10-CM

## 2021-03-25 PROCEDURE — 76775 US EXAM ABDO BACK WALL LIM: CPT

## 2021-04-01 DIAGNOSIS — I71.4 ABDOMINAL AORTIC ANEURYSM (AAA) WITHOUT RUPTURE (HCC): Primary | ICD-10-CM

## 2021-04-22 ENCOUNTER — CONSULT (OUTPATIENT)
Dept: VASCULAR SURGERY | Facility: CLINIC | Age: 71
End: 2021-04-22
Payer: COMMERCIAL

## 2021-04-22 VITALS
TEMPERATURE: 98.2 F | WEIGHT: 271.2 LBS | HEIGHT: 77 IN | BODY MASS INDEX: 32.02 KG/M2 | DIASTOLIC BLOOD PRESSURE: 80 MMHG | RESPIRATION RATE: 17 BRPM | HEART RATE: 72 BPM | SYSTOLIC BLOOD PRESSURE: 140 MMHG

## 2021-04-22 DIAGNOSIS — E78.2 MIXED HYPERLIPIDEMIA: ICD-10-CM

## 2021-04-22 DIAGNOSIS — I10 ESSENTIAL HYPERTENSION: Primary | ICD-10-CM

## 2021-04-22 DIAGNOSIS — I71.4 ABDOMINAL AORTIC ANEURYSM (AAA) WITHOUT RUPTURE (HCC): ICD-10-CM

## 2021-04-22 DIAGNOSIS — E03.9 ACQUIRED HYPOTHYROIDISM: ICD-10-CM

## 2021-04-22 PROCEDURE — 99204 OFFICE O/P NEW MOD 45 MIN: CPT | Performed by: PHYSICIAN ASSISTANT

## 2021-04-22 PROCEDURE — 3079F DIAST BP 80-89 MM HG: CPT | Performed by: PHYSICIAN ASSISTANT

## 2021-04-22 PROCEDURE — 1036F TOBACCO NON-USER: CPT | Performed by: PHYSICIAN ASSISTANT

## 2021-04-22 PROCEDURE — 3077F SYST BP >= 140 MM HG: CPT | Performed by: PHYSICIAN ASSISTANT

## 2021-04-22 PROCEDURE — 3008F BODY MASS INDEX DOCD: CPT | Performed by: PHYSICIAN ASSISTANT

## 2021-04-22 RX ORDER — HYALURONATE SOD, CROSS-LINKED 30 MG/3 ML
SYRINGE (ML) INTRAARTICULAR
COMMUNITY
Start: 2021-04-15 | End: 2022-01-11

## 2021-04-22 NOTE — ASSESSMENT & PLAN NOTE
69year old male former smoker w/ HTN, HLD, CKD3, hypothyroidism, hx of kidney cancer s/p nephrectomy, presenting for review of recent ultrasound of the abdominal aorta, which demonstrates a 3 3cm proximal AAA    US Abdominal Aorta  -Mild proximal AAA measuring 3 3 x 3 4 cm    Plan:  -Patient remains asymptomatic  Denies chest, back or abdominal pain  -Imaging reviewed with patient  Evidence of proximal AAA  Currently measuring <5 5cm, therefore will continue with conservative management at this time   No surgical intervention warranted at this time  -Discussed pathophysiology of aneurysmal disease and indications for surgical treatment  -Recommend adequate blood pressure control  -Will obtain AOIL in 1 year  -Follow up in 1 year for review of imaging

## 2021-04-22 NOTE — PROGRESS NOTES
Assessment/Plan:    Abdominal aortic aneurysm (AAA) without rupture [facility])  69year old male former smoker w/ HTN, HLD, CKD3, hypothyroidism, hx of kidney cancer, presenting for review of recent ultrasound of the abdominal aorta, which demonstrates a 3 3cm proximal AAA    US Abdominal Aorta  -Mild proximal AAA measuring 3 3 x 3 4 cm    Plan:  -Patient remains asymptomatic  Denies chest, back or abdominal pain  -Imaging reviewed with patient  Evidence of proximal AAA  Currently measuring <5 5cm, therefore will continue with conservative management at this time  No surgical intervention warranted at this time  -Discussed pathophysiology of aneurysmal disease and indications for surgical treatment  -Recommend adequate blood pressure control  -Will obtain AOIL in 1 year  -Follow up in 1 year for review of imaging    Essential hypertension  -BP stable in the office today  -Continue adequate BP control  -Medical management per PCP    Mixed hyperlipidemia  -Stable  -Encourage low cholesterol, low fat diet  -Medical management per PCP    Acquired hypothyroidism  -Stable  -Continue levothyroxine, regular TSH checks  -Medical management per PCP       Diagnoses and all orders for this visit:    Essential hypertension    Abdominal aortic aneurysm (AAA) without rupture ([address] )  -     Ambulatory referral to Vascular Surgery  -     VAS ABDOMINAL AORTA/ILIACS; WITH CARLENE'S; Future    Mixed hyperlipidemia    Acquired hypothyroidism    Other orders  -     Visco-3 injection          Subjective:      Patient ID: Keeley Burt is a 69 y o  male  69year old male presenting for review of recent abdominal ultrasound which demonstrated a 3 3cm proximal AAA  Patient remains asymptomatic  He denies abdominal pain, chest pain or back pain  He reports that his spine doctor had found it on imaging and referred him to undergo ultrasound  He is not aware of a family hx of AAA  He has HTN which he takes medication for  He follows with his PCP q6mo  He denies claudication or rest pain  The following portions of the patient's history were reviewed and updated as appropriate: allergies, current medications, past family history, past medical history, past social history, past surgical history and problem list     New patient presents in office for AAA who was referred by Yaron Crystal DO  Patient had an US Abdominal Aorta done on 03/25/2021  Patient denies having any change in appetite, pain after eating or lower back pain  Patient is former smoker  Review of Systems   Constitutional: Negative  HENT: Positive for hearing loss  Eyes: Negative  Respiratory: Negative  Cardiovascular: Negative  Gastrointestinal: Negative  Endocrine: Negative  Genitourinary: Negative  Musculoskeletal: Negative  Skin: Negative  Allergic/Immunologic: Negative  Neurological: Negative  Hematological: Bruises/bleeds easily  Psychiatric/Behavioral: Negative  I have reviewed and made appropriate changes to the review of systems input by the medical assistant      Vitals:    04/22/21 1254 04/22/21 1257   BP: 130/80 140/80   BP Location: Left arm Right arm   Patient Position: Sitting Sitting   Cuff Size: Adult Adult   Pulse: 72    Resp: 17    Temp: 98 2 °F (36 8 °C)    TempSrc: Tympanic    Weight: 123 kg (271 lb 3 2 oz)    Height: 6' 5" (1 956 m)        Patient Active Problem List   Diagnosis    Inguinal hernia unilateral, non-recurrent    Allergic rhinitis    Benign prostatic hyperplasia with nocturia    Cholelithiasis    Stage 3a chronic kidney disease    Colon polyp    ED (erectile dysfunction)    Gait disturbance    Hyperglycemia    Mixed hyperlipidemia    Essential hypertension    Left knee pain    Osteoarthritis    Malignant neoplasm of right kidney, except renal pelvis (HCC)    Health care maintenance    Tinnitus    Personal history of kidney cancer    Gilbert's syndrome    Acquired hypothyroidism    Morbid obesity with BMI of 40 0-44 9, adult ([address] )    Abdominal aortic aneurysm (AAA) without rupture [facility])       Past Surgical History:   Procedure Laterality Date    APPENDECTOMY      COLONOSCOPY      CYSTOSCOPY      Diagnostic    HERNIA REPAIR Right 7/21/2016    Procedure: REPAIR HERNIA INGUINAL, LAPAROSCOPIC;  Surgeon: Jaime Sow MD;  Location: [facility];  Service:     HIP ARTHROPLASTY Left     twice 1999 and 2011    HIP ARTHROPLASTY Right     [address] HERNIA REPAIR  07/21/2016    description: laparoscopic right inguinal hernia repair with mesh     NEPHRECTOMY Right     due to a tumor    ROTATOR CUFF REPAIR Left     TOTAL HIP ARTHROPLASTY      TOTAL HIP ARTHROPLASTY Bilateral        Family History   Problem Relation Age of Onset    Heart attack Mother     Cancer Father        Social History     Socioeconomic History    Marital status: /Civil Union     Spouse name: Not on file    Number of children: Not on file    Years of education: Not on file    Highest education level: Not on file   Occupational History    Not on file   Social Needs    Financial resource strain: Not on file    Food insecurity     Worry: Not on file     Inability: Not on file   [employer] needs     Medical: Not on file     Non-medical: Not on file   Tobacco Use    Smoking status: Former Smoker    Smokeless tobacco: Never Used   Substance and Sexual Activity    Alcohol use: Yes     Comment: "very little" Per allscripts - social    Drug use: No    Sexual activity: Not on file   Lifestyle    Physical activity     Days per week: Not on file     Minutes per session: Not on file    Stress: Not on file   Relationships    Social connections     Talks on phone: Not on file     Gets together: Not on file     Attends Restorationism service: Not on file     Active member of club or organization: Not on file     Attends meetings of clubs or organizations: Not on file     Relationship status: Not on file    Intimate partner violence     Fear of current or ex partner: Not on file     Emotionally abused: Not on file     Physically abused: Not on file     Forced sexual activity: Not on file   Other Topics Concern    Not on file   Social History Narrative    Not on file       Allergies   Allergen Reactions    Nsaids Other (See Comments)     Instructed to avoid all ibuprofen's/NSAIDS  as pt only has one kidney         Current Outpatient Medications:     irbesartan (AVAPRO) 150 mg tablet, Take 1 tablet (150 mg total) by mouth daily at bedtime, Disp: 90 tablet, Rfl: 3    levothyroxine 25 mcg tablet, Take 1 tablet (25 mcg total) by mouth daily in the early morning, Disp: 90 tablet, Rfl: 3    mupirocin (BACTROBAN) 2 % ointment, Apply topically 3 (three) times a day, Disp: 22 g, Rfl: 0    tamsulosin (FLOMAX) 0 4 mg, Take 1 capsule (0 4 mg total) by mouth daily with dinner, Disp: 90 capsule, Rfl: 3    Visco-3 injection, , Disp: , Rfl:     Objective:      /80 (BP Location: Right arm, Patient Position: Sitting, Cuff Size: Adult)   Pulse 72   Temp 98 2 °F (36 8 °C) (Tympanic)   Resp 17   Ht 6' 5" (1 956 m)   Wt 123 kg (271 lb 3 2 oz)   BMI 32 16 kg/m²          Physical Exam  Constitutional:       General: He is not in acute distress  Appearance: Normal appearance  He is not ill-appearing, toxic-appearing or diaphoretic  HENT:      Head: Normocephalic and atraumatic  Right Ear: External ear normal       Left Ear: External ear normal       Nose: Nose normal       Mouth/Throat:      Mouth: Mucous membranes are moist       Pharynx: Oropharynx is clear  Eyes:      General: No scleral icterus  Extraocular Movements: Extraocular movements intact  Conjunctiva/sclera: Conjunctivae normal    Neck:      Musculoskeletal: Normal range of motion and neck supple  Cardiovascular:      Rate and Rhythm: Normal rate and regular rhythm        Pulses:           Radial pulses are 2+ on the right side and 2+ on the left side  Heart sounds: Normal heart sounds  Pulmonary:      Effort: Pulmonary effort is normal  No respiratory distress  Breath sounds: Normal breath sounds  Abdominal:      General: Abdomen is flat  Palpations: Abdomen is soft  There is no mass  Tenderness: There is no abdominal tenderness  Musculoskeletal: Normal range of motion  Right lower leg: No edema  Left lower leg: No edema  Skin:     General: Skin is warm and dry  Capillary Refill: Capillary refill takes less than 2 seconds  Neurological:      General: No focal deficit present  Mental Status: He is alert and oriented to person, place, and time  Mental status is at baseline  Cranial Nerves: No cranial nerve deficit  Sensory: No sensory deficit  Motor: No weakness     Psychiatric:         Mood and Affect: Mood normal          Behavior: Behavior normal

## 2021-04-22 NOTE — PATIENT INSTRUCTIONS
Nonruptured Abdominal Aortic Aneurysm   AMBULATORY CARE:   An abdominal aortic aneurysm (AAA)  is a bulging or weak area in your abdominal aorta  Over time, the bulge may grow and is at risk for tearing or rupturing  The aorta is a large blood vessel that extends from your heart to your abdomen  The part of the aorta that extends into your abdomen is called your abdominal aorta  Your abdominal aorta brings blood to your stomach, pelvis, and legs  An AAA that ruptures is a life-threatening emergency  Signs and symptoms: An AAA usually does not have signs or symptoms if it has not ruptured  If the AAA starts to leak or ruptures, you may have any of the following:  · Sudden pain in your abdomen, groin, back, legs, or buttocks    · Nausea and vomiting    · A lump or swelling in your abdomen    · Stiff abdominal muscles    · Numbness or tingling in your legs    · Pale, sweaty, or clammy skin    · Dizziness, fainting or loss of consciousness    Call your local emergency number (911 in the [place]), or have someone else call if:   · You faint or lose consciousness  · You cannot be woken  Seek care immediately if:  The following signs or symptoms may mean the AAA is at risk of rupturing:  · You have sudden sharp pain in your abdomen, groin, back, legs, or buttocks  · You have nausea and vomiting  · You feel dizzy  · You have stiffness or swelling in your abdomen, or a lump in your abdomen  · You have numbness or tingling in your legs  · Your skin is pale, sweaty, or clammy  Call your doctor if:   · You have questions or concerns about your condition or care  Treatment for an AAA  may not be needed  Your healthcare provider may monitor the size of your AAA with tests, such as an ultrasound  You may be given medicines to prevent the AAA from growing  Examples include medicines to lower your blood pressure or cholesterol level   If your AAA gets bigger, starts to leak, or ruptures, you may need any of the following:  · Endovascular repair  is a procedure that uses a graft to repair your AAA  The graft stops blood flow to the aneurysm and protects your abdominal aorta  You may need to have more than 1 endovascular repair  · Open repair  is surgery to repair or remove an AAA  Manage a nonruptured AAA:  You can help prevent your AAA from growing or rupturing by doing the following:  · Do not smoke  Nicotine and other chemicals in cigarettes and cigars can increase your blood pressure  It can also damage your aorta and increase the size of your AAA  Ask your healthcare provider for information if you currently smoke and need help to quit  E-cigarettes or smokeless tobacco still contain nicotine  Talk to your healthcare provider before you use these products  · Exercise as directed  Exercise can help control your blood pressure and cholesterol level  Ask your healthcare provider how much exercise you need each day and which exercises are best for you  · Follow the meal plan recommended by your healthcare provider  Talk to your dietitian about a heart-healthy or low-sodium eating plan  Meal plans will help you lower your cholesterol and blood pressure  They will also help you reach a healthy weight  · Do not lift anything heavier than 10 pounds  Heavy lifting can increase pressure in your abdominal aorta  This can increase your risk for a ruptured AAA  Follow up with your doctor as directed: You will need regular tests and follow-up visits to monitor the size of your AAA  Keep all appointments  Write down your questions so you remember to ask them during your visits  © Copyright [employer] Information is for End User's use only and may not be sold, redistributed or otherwise used for commercial purposes  All illustrations and images included in CareNotes® are the copyrighted property of A D A M , Inc  or [facility] Marilyn Goode   The above information is an  only   It is not intended as medical advice for individual conditions or treatments  Talk to your doctor, nurse or pharmacist before following any medical regimen to see if it is safe and effective for you

## 2021-07-13 ENCOUNTER — TELEPHONE (OUTPATIENT)
Dept: UROLOGY | Facility: MEDICAL CENTER | Age: 71
End: 2021-07-13

## 2021-07-13 DIAGNOSIS — N40.1 BENIGN PROSTATIC HYPERPLASIA WITH NOCTURIA: Primary | ICD-10-CM

## 2021-07-13 DIAGNOSIS — R35.1 BENIGN PROSTATIC HYPERPLASIA WITH NOCTURIA: Primary | ICD-10-CM

## 2021-07-13 NOTE — TELEPHONE ENCOUNTER
Patient of Dr Chino Qurioz in [facility]    Patient called to reschedule his appointment  Patient rescheduled appointment for 10/29/21 with Dr Chino Quiroz  Patient will need an order for psa to be mailed to patient's home  Patient will do the test one week prior to appointment

## 2021-07-26 DIAGNOSIS — N40.1 BENIGN PROSTATIC HYPERPLASIA WITH NOCTURIA: ICD-10-CM

## 2021-07-26 DIAGNOSIS — R35.1 BENIGN PROSTATIC HYPERPLASIA WITH NOCTURIA: ICD-10-CM

## 2021-07-26 RX ORDER — TAMSULOSIN HYDROCHLORIDE 0.4 MG/1
0.4 CAPSULE ORAL
Qty: 90 CAPSULE | Refills: 0
Start: 2021-07-26 | End: 2021-08-02 | Stop reason: SDUPTHER

## 2021-08-02 DIAGNOSIS — N40.1 BENIGN PROSTATIC HYPERPLASIA WITH NOCTURIA: ICD-10-CM

## 2021-08-02 DIAGNOSIS — R35.1 BENIGN PROSTATIC HYPERPLASIA WITH NOCTURIA: ICD-10-CM

## 2021-08-02 RX ORDER — TAMSULOSIN HYDROCHLORIDE 0.4 MG/1
0.4 CAPSULE ORAL
Qty: 90 CAPSULE | Refills: 3
Start: 2021-08-02 | End: 2021-08-04 | Stop reason: SDUPTHER

## 2021-08-02 NOTE — TELEPHONE ENCOUNTER
I am completely out of my Tamsulosin, and Kelsie never received a script last week  Please call me with any questions

## 2021-08-04 ENCOUNTER — NURSE TRIAGE (OUTPATIENT)
Dept: OTHER | Facility: OTHER | Age: 71
End: 2021-08-04

## 2021-08-04 DIAGNOSIS — R35.1 BENIGN PROSTATIC HYPERPLASIA WITH NOCTURIA: ICD-10-CM

## 2021-08-04 DIAGNOSIS — N40.1 BENIGN PROSTATIC HYPERPLASIA WITH NOCTURIA: ICD-10-CM

## 2021-08-04 RX ORDER — TAMSULOSIN HYDROCHLORIDE 0.4 MG/1
0.4 CAPSULE ORAL
Qty: 90 CAPSULE | Refills: 3 | Status: SHIPPED | OUTPATIENT
Start: 2021-08-04 | End: 2022-01-11

## 2021-08-04 NOTE — TELEPHONE ENCOUNTER
Regarding: medication (ordered by MD but never sent electronically)  ----- Message from Yecenia Client sent at 8/4/2021 10:54 AM EDT -----  "I ordered a refill of Tamulosin and it still has not been called into the pharmacy  "

## 2021-08-04 NOTE — TELEPHONE ENCOUNTER
Answer Assessment - Initial Assessment Questions  1  NAME of MEDICATION: "What medicine are you calling about?"      tamsulosin  2  QUESTION: "What is your question?"      Order was not received by the pharmacy  3  PRESCRIBING HCP: "Who prescribed it?" Reason: if prescribed by specialist, call should be referred to that group        Svetlana Minus    Protocols used: MEDICATION QUESTION CALL-ADULT-[place]

## 2021-08-04 NOTE — TELEPHONE ENCOUNTER
AP provider pool: Patient's prescription for tamsulosin (Flomax) was not sent through to the pharmacy  The ordering class was: no print  Please sned the prescription to the pharmacy ASAP as the patient is out of medication  This is the second time patient did not get refill as requested due to the ordering class marked no print  Ramiro Berg

## 2021-09-24 ENCOUNTER — APPOINTMENT (OUTPATIENT)
Dept: LAB | Facility: CLINIC | Age: 71
End: 2021-09-24
Payer: COMMERCIAL

## 2021-09-24 DIAGNOSIS — Z11.59 ENCOUNTER FOR HEPATITIS C SCREENING TEST FOR LOW RISK PATIENT: ICD-10-CM

## 2021-09-24 DIAGNOSIS — R35.1 BENIGN PROSTATIC HYPERPLASIA WITH NOCTURIA: ICD-10-CM

## 2021-09-24 DIAGNOSIS — E03.9 HYPOTHYROIDISM, UNSPECIFIED TYPE: ICD-10-CM

## 2021-09-24 DIAGNOSIS — E03.9 ACQUIRED HYPOTHYROIDISM: ICD-10-CM

## 2021-09-24 DIAGNOSIS — E66.01 MORBID OBESITY WITH BMI OF 40.0-44.9, ADULT (HCC): ICD-10-CM

## 2021-09-24 DIAGNOSIS — E78.2 MIXED HYPERLIPIDEMIA: ICD-10-CM

## 2021-09-24 DIAGNOSIS — M15.9 OSTEOARTHRITIS OF MULTIPLE JOINTS, UNSPECIFIED OSTEOARTHRITIS TYPE: ICD-10-CM

## 2021-09-24 DIAGNOSIS — I71.4 ABDOMINAL AORTIC ANEURYSM (AAA) WITHOUT RUPTURE (HCC): ICD-10-CM

## 2021-09-24 DIAGNOSIS — R73.9 HYPERGLYCEMIA: ICD-10-CM

## 2021-09-24 DIAGNOSIS — C64.1 MALIGNANT NEOPLASM OF RIGHT KIDNEY, EXCEPT RENAL PELVIS (HCC): ICD-10-CM

## 2021-09-24 DIAGNOSIS — N40.1 BENIGN PROSTATIC HYPERPLASIA WITH NOCTURIA: ICD-10-CM

## 2021-09-24 DIAGNOSIS — I10 ESSENTIAL HYPERTENSION: ICD-10-CM

## 2021-09-24 LAB
ALBUMIN SERPL BCP-MCNC: 3.6 G/DL (ref 3.5–5)
ALP SERPL-CCNC: 75 U/L (ref 46–116)
ALT SERPL W P-5'-P-CCNC: 20 U/L (ref 12–78)
ANION GAP SERPL CALCULATED.3IONS-SCNC: 4 MMOL/L (ref 4–13)
AST SERPL W P-5'-P-CCNC: 9 U/L (ref 5–45)
BILIRUB SERPL-MCNC: 0.75 MG/DL (ref 0.2–1)
BILIRUB UR QL STRIP: NEGATIVE
BUN SERPL-MCNC: 25 MG/DL (ref 5–25)
CALCIUM SERPL-MCNC: 9.7 MG/DL (ref 8.3–10.1)
CHLORIDE SERPL-SCNC: 110 MMOL/L (ref 100–108)
CHOLEST SERPL-MCNC: 181 MG/DL (ref 50–200)
CLARITY UR: CLEAR
CO2 SERPL-SCNC: 26 MMOL/L (ref 21–32)
COLOR UR: YELLOW
CREAT SERPL-MCNC: 1.5 MG/DL (ref 0.6–1.3)
ERYTHROCYTE [DISTWIDTH] IN BLOOD BY AUTOMATED COUNT: 12 % (ref 11.6–15.1)
EST. AVERAGE GLUCOSE BLD GHB EST-MCNC: 114 MG/DL
GFR SERPL CREATININE-BSD FRML MDRD: 46 ML/MIN/1.73SQ M
GLUCOSE P FAST SERPL-MCNC: 111 MG/DL (ref 65–99)
GLUCOSE UR STRIP-MCNC: NEGATIVE MG/DL
HBA1C MFR BLD: 5.6 %
HCT VFR BLD AUTO: 44.4 % (ref 36.5–49.3)
HCV AB SER QL: NORMAL
HDLC SERPL-MCNC: 31 MG/DL
HGB BLD-MCNC: 14.6 G/DL (ref 12–17)
HGB UR QL STRIP.AUTO: NEGATIVE
KETONES UR STRIP-MCNC: NEGATIVE MG/DL
LDLC SERPL CALC-MCNC: 98 MG/DL (ref 0–100)
LEUKOCYTE ESTERASE UR QL STRIP: NEGATIVE
MCH RBC QN AUTO: 30.8 PG (ref 26.8–34.3)
MCHC RBC AUTO-ENTMCNC: 32.9 G/DL (ref 31.4–37.4)
MCV RBC AUTO: 94 FL (ref 82–98)
NITRITE UR QL STRIP: NEGATIVE
PH UR STRIP.AUTO: 6 [PH]
PLATELET # BLD AUTO: 314 THOUSANDS/UL (ref 149–390)
PMV BLD AUTO: 9.5 FL (ref 8.9–12.7)
POTASSIUM SERPL-SCNC: 4.2 MMOL/L (ref 3.5–5.3)
PROT SERPL-MCNC: 6.7 G/DL (ref 6.4–8.2)
PROT UR STRIP-MCNC: NEGATIVE MG/DL
RBC # BLD AUTO: 4.74 MILLION/UL (ref 3.88–5.62)
SODIUM SERPL-SCNC: 140 MMOL/L (ref 136–145)
SP GR UR STRIP.AUTO: 1.02 (ref 1–1.03)
T4 SERPL-MCNC: 9.5 UG/DL (ref 4.7–13.3)
TRIGL SERPL-MCNC: 259 MG/DL
TSH SERPL DL<=0.05 MIU/L-ACNC: 2.67 UIU/ML (ref 0.36–3.74)
UROBILINOGEN UR QL STRIP.AUTO: 0.2 E.U./DL
WBC # BLD AUTO: 10.13 THOUSAND/UL (ref 4.31–10.16)

## 2021-09-24 PROCEDURE — 36415 COLL VENOUS BLD VENIPUNCTURE: CPT

## 2021-09-24 PROCEDURE — 84443 ASSAY THYROID STIM HORMONE: CPT

## 2021-09-24 PROCEDURE — 80061 LIPID PANEL: CPT

## 2021-09-24 PROCEDURE — 80053 COMPREHEN METABOLIC PANEL: CPT

## 2021-09-24 PROCEDURE — 85027 COMPLETE CBC AUTOMATED: CPT

## 2021-09-24 PROCEDURE — 83036 HEMOGLOBIN GLYCOSYLATED A1C: CPT

## 2021-09-24 PROCEDURE — 84436 ASSAY OF TOTAL THYROXINE: CPT

## 2021-09-24 PROCEDURE — 81003 URINALYSIS AUTO W/O SCOPE: CPT

## 2021-09-24 PROCEDURE — 86803 HEPATITIS C AB TEST: CPT

## 2021-10-01 ENCOUNTER — OFFICE VISIT (OUTPATIENT)
Dept: FAMILY MEDICINE CLINIC | Facility: CLINIC | Age: 71
End: 2021-10-01
Payer: COMMERCIAL

## 2021-10-01 VITALS
BODY MASS INDEX: 28.93 KG/M2 | HEART RATE: 72 BPM | DIASTOLIC BLOOD PRESSURE: 78 MMHG | WEIGHT: 245 LBS | RESPIRATION RATE: 16 BRPM | SYSTOLIC BLOOD PRESSURE: 132 MMHG | HEIGHT: 77 IN

## 2021-10-01 DIAGNOSIS — Z23 ENCOUNTER FOR IMMUNIZATION: ICD-10-CM

## 2021-10-01 DIAGNOSIS — R63.4 WEIGHT LOSS, ABNORMAL: Primary | ICD-10-CM

## 2021-10-01 DIAGNOSIS — E03.9 ACQUIRED HYPOTHYROIDISM: ICD-10-CM

## 2021-10-01 DIAGNOSIS — I10 ESSENTIAL HYPERTENSION: ICD-10-CM

## 2021-10-01 DIAGNOSIS — R10.30 LOWER ABDOMINAL PAIN: ICD-10-CM

## 2021-10-01 DIAGNOSIS — E80.4 GILBERT'S SYNDROME: ICD-10-CM

## 2021-10-01 DIAGNOSIS — R73.9 HYPERGLYCEMIA: ICD-10-CM

## 2021-10-01 DIAGNOSIS — K80.20 CALCULUS OF GALLBLADDER WITHOUT CHOLECYSTITIS WITHOUT OBSTRUCTION: ICD-10-CM

## 2021-10-01 DIAGNOSIS — E78.2 MIXED HYPERLIPIDEMIA: ICD-10-CM

## 2021-10-01 DIAGNOSIS — I71.4 ABDOMINAL AORTIC ANEURYSM (AAA) WITHOUT RUPTURE (HCC): ICD-10-CM

## 2021-10-01 DIAGNOSIS — K63.5 POLYP OF COLON, UNSPECIFIED PART OF COLON, UNSPECIFIED TYPE: ICD-10-CM

## 2021-10-01 DIAGNOSIS — R19.8 CHANGE IN BOWEL MOVEMENT: ICD-10-CM

## 2021-10-01 DIAGNOSIS — Z00.00 HEALTH CARE MAINTENANCE: ICD-10-CM

## 2021-10-01 DIAGNOSIS — C64.1 MALIGNANT NEOPLASM OF RIGHT KIDNEY, EXCEPT RENAL PELVIS (HCC): ICD-10-CM

## 2021-10-01 PROBLEM — E66.01 MORBID OBESITY WITH BMI OF 40.0-44.9, ADULT (HCC): Status: RESOLVED | Noted: 2021-03-24 | Resolved: 2021-10-01

## 2021-10-01 PROCEDURE — 99215 OFFICE O/P EST HI 40 MIN: CPT

## 2021-10-01 PROCEDURE — 3725F SCREEN DEPRESSION PERFORMED: CPT

## 2021-10-01 PROCEDURE — 90662 IIV NO PRSV INCREASED AG IM: CPT

## 2021-10-01 PROCEDURE — G0008 ADMIN INFLUENZA VIRUS VAC: HCPCS

## 2021-10-01 PROCEDURE — 1170F FXNL STATUS ASSESSED: CPT

## 2021-10-01 PROCEDURE — 1101F PT FALLS ASSESS-DOCD LE1/YR: CPT

## 2021-10-01 PROCEDURE — 3288F FALL RISK ASSESSMENT DOCD: CPT

## 2021-10-01 PROCEDURE — G0439 PPPS, SUBSEQ VISIT: HCPCS

## 2021-10-01 PROCEDURE — 1125F AMNT PAIN NOTED PAIN PRSNT: CPT

## 2021-10-14 ENCOUNTER — HOSPITAL ENCOUNTER (OUTPATIENT)
Dept: CT IMAGING | Facility: HOSPITAL | Age: 71
Discharge: HOME | End: 2021-10-14
Payer: COMMERCIAL

## 2021-10-14 DIAGNOSIS — I71.4 ABDOMINAL AORTIC ANEURYSM (AAA) WITHOUT RUPTURE (HCC): ICD-10-CM

## 2021-10-14 DIAGNOSIS — R63.4 WEIGHT LOSS, ABNORMAL: ICD-10-CM

## 2021-10-14 DIAGNOSIS — K63.5 POLYP OF COLON, UNSPECIFIED PART OF COLON, UNSPECIFIED TYPE: ICD-10-CM

## 2021-10-14 DIAGNOSIS — R10.30 LOWER ABDOMINAL PAIN: ICD-10-CM

## 2021-10-14 DIAGNOSIS — K80.20 CALCULUS OF GALLBLADDER WITHOUT CHOLECYSTITIS WITHOUT OBSTRUCTION: ICD-10-CM

## 2021-10-14 DIAGNOSIS — R19.8 CHANGE IN BOWEL MOVEMENT: ICD-10-CM

## 2021-10-14 DIAGNOSIS — C64.1 MALIGNANT NEOPLASM OF RIGHT KIDNEY, EXCEPT RENAL PELVIS (HCC): ICD-10-CM

## 2021-10-14 PROCEDURE — 74176 CT ABD & PELVIS W/O CONTRAST: CPT

## 2021-10-14 PROCEDURE — G1004 CDSM NDSC: HCPCS

## 2021-10-14 PROCEDURE — 71250 CT THORAX DX C-: CPT

## 2021-10-20 PROBLEM — R91.8 PULMONARY NODULES: Status: ACTIVE | Noted: 2021-10-20

## 2021-10-20 PROBLEM — K52.9 COLITIS: Status: ACTIVE | Noted: 2021-10-20

## 2021-10-20 PROBLEM — I70.0 ABDOMINAL AORTIC ATHEROSCLEROSIS (HCC): Status: ACTIVE | Noted: 2021-10-20

## 2021-10-21 ENCOUNTER — APPOINTMENT (OUTPATIENT)
Dept: LAB | Facility: CLINIC | Age: 71
End: 2021-10-21
Payer: COMMERCIAL

## 2021-10-21 LAB — PSA SERPL-MCNC: 0.3 NG/ML (ref 0–4)

## 2021-10-21 PROCEDURE — 84153 ASSAY OF PSA TOTAL: CPT

## 2021-10-27 ENCOUNTER — OFFICE VISIT (OUTPATIENT)
Dept: FAMILY MEDICINE CLINIC | Facility: CLINIC | Age: 71
End: 2021-10-27
Payer: COMMERCIAL

## 2021-10-27 VITALS
HEART RATE: 64 BPM | WEIGHT: 240 LBS | DIASTOLIC BLOOD PRESSURE: 78 MMHG | SYSTOLIC BLOOD PRESSURE: 132 MMHG | HEIGHT: 77 IN | BODY MASS INDEX: 28.34 KG/M2

## 2021-10-27 DIAGNOSIS — R73.9 HYPERGLYCEMIA: ICD-10-CM

## 2021-10-27 DIAGNOSIS — E03.9 ACQUIRED HYPOTHYROIDISM: ICD-10-CM

## 2021-10-27 DIAGNOSIS — C64.1 MALIGNANT NEOPLASM OF RIGHT KIDNEY, EXCEPT RENAL PELVIS (HCC): ICD-10-CM

## 2021-10-27 DIAGNOSIS — E80.4 GILBERT'S SYNDROME: ICD-10-CM

## 2021-10-27 DIAGNOSIS — R63.4 WEIGHT LOSS: ICD-10-CM

## 2021-10-27 DIAGNOSIS — R91.8 PULMONARY NODULES: ICD-10-CM

## 2021-10-27 DIAGNOSIS — N18.31 STAGE 3A CHRONIC KIDNEY DISEASE (HCC): ICD-10-CM

## 2021-10-27 DIAGNOSIS — K63.5 POLYP OF COLON, UNSPECIFIED PART OF COLON, UNSPECIFIED TYPE: ICD-10-CM

## 2021-10-27 DIAGNOSIS — K52.9 COLITIS: Primary | ICD-10-CM

## 2021-10-27 DIAGNOSIS — R35.1 BENIGN PROSTATIC HYPERPLASIA WITH NOCTURIA: ICD-10-CM

## 2021-10-27 DIAGNOSIS — R19.8 CHANGE IN BOWEL MOVEMENT: ICD-10-CM

## 2021-10-27 DIAGNOSIS — E78.2 MIXED HYPERLIPIDEMIA: ICD-10-CM

## 2021-10-27 DIAGNOSIS — I70.0 ABDOMINAL AORTIC ATHEROSCLEROSIS (HCC): ICD-10-CM

## 2021-10-27 DIAGNOSIS — N40.1 BENIGN PROSTATIC HYPERPLASIA WITH NOCTURIA: ICD-10-CM

## 2021-10-27 DIAGNOSIS — R26.9 GAIT DISTURBANCE: ICD-10-CM

## 2021-10-27 DIAGNOSIS — I71.4 ABDOMINAL AORTIC ANEURYSM (AAA) WITHOUT RUPTURE (HCC): ICD-10-CM

## 2021-10-27 DIAGNOSIS — I10 ESSENTIAL HYPERTENSION: ICD-10-CM

## 2021-10-27 PROCEDURE — 99215 OFFICE O/P EST HI 40 MIN: CPT | Performed by: FAMILY MEDICINE

## 2021-10-27 PROCEDURE — 3075F SYST BP GE 130 - 139MM HG: CPT | Performed by: FAMILY MEDICINE

## 2021-10-27 PROCEDURE — 3078F DIAST BP <80 MM HG: CPT | Performed by: FAMILY MEDICINE

## 2021-10-27 PROCEDURE — 1036F TOBACCO NON-USER: CPT | Performed by: FAMILY MEDICINE

## 2021-10-27 PROCEDURE — 1160F RVW MEDS BY RX/DR IN RCRD: CPT | Performed by: FAMILY MEDICINE

## 2021-10-27 RX ORDER — ROSUVASTATIN CALCIUM 5 MG/1
5 TABLET, COATED ORAL DAILY
Qty: 30 TABLET | Refills: 5 | Status: SHIPPED | OUTPATIENT
Start: 2021-10-27 | End: 2022-07-18 | Stop reason: ALTCHOICE

## 2021-10-27 RX ORDER — CLOPIDOGREL BISULFATE 75 MG/1
75 TABLET ORAL DAILY
Qty: 30 TABLET | Refills: 5 | Status: SHIPPED | OUTPATIENT
Start: 2021-10-27 | End: 2022-07-18 | Stop reason: ALTCHOICE

## 2021-10-29 ENCOUNTER — OFFICE VISIT (OUTPATIENT)
Dept: UROLOGY | Facility: MEDICAL CENTER | Age: 71
End: 2021-10-29
Payer: COMMERCIAL

## 2021-10-29 VITALS — WEIGHT: 242 LBS | HEIGHT: 77 IN | BODY MASS INDEX: 28.57 KG/M2

## 2021-10-29 DIAGNOSIS — R35.1 BENIGN PROSTATIC HYPERPLASIA WITH NOCTURIA: Primary | ICD-10-CM

## 2021-10-29 DIAGNOSIS — N40.1 BENIGN PROSTATIC HYPERPLASIA WITH NOCTURIA: Primary | ICD-10-CM

## 2021-10-29 DIAGNOSIS — Z12.5 PROSTATE CANCER SCREENING: ICD-10-CM

## 2021-10-29 LAB — POST-VOID RESIDUAL VOLUME, ML POC: 13 ML

## 2021-10-29 PROCEDURE — 3008F BODY MASS INDEX DOCD: CPT | Performed by: FAMILY MEDICINE

## 2021-10-29 PROCEDURE — 99214 OFFICE O/P EST MOD 30 MIN: CPT | Performed by: PHYSICIAN ASSISTANT

## 2021-10-29 PROCEDURE — 51798 US URINE CAPACITY MEASURE: CPT | Performed by: PHYSICIAN ASSISTANT

## 2021-11-02 ENCOUNTER — OFFICE VISIT (OUTPATIENT)
Dept: VASCULAR SURGERY | Facility: CLINIC | Age: 71
End: 2021-11-02
Payer: COMMERCIAL

## 2021-11-02 VITALS
HEART RATE: 77 BPM | SYSTOLIC BLOOD PRESSURE: 132 MMHG | BODY MASS INDEX: 27.75 KG/M2 | DIASTOLIC BLOOD PRESSURE: 82 MMHG | WEIGHT: 235 LBS | HEIGHT: 77 IN

## 2021-11-02 DIAGNOSIS — K52.9 COLITIS: Primary | ICD-10-CM

## 2021-11-02 DIAGNOSIS — I71.4 ABDOMINAL AORTIC ANEURYSM (AAA) WITHOUT RUPTURE (HCC): ICD-10-CM

## 2021-11-02 PROCEDURE — 3079F DIAST BP 80-89 MM HG: CPT | Performed by: SURGERY

## 2021-11-02 PROCEDURE — 3008F BODY MASS INDEX DOCD: CPT | Performed by: SURGERY

## 2021-11-02 PROCEDURE — 1036F TOBACCO NON-USER: CPT | Performed by: SURGERY

## 2021-11-02 PROCEDURE — 99213 OFFICE O/P EST LOW 20 MIN: CPT | Performed by: SURGERY

## 2021-11-02 PROCEDURE — 3075F SYST BP GE 130 - 139MM HG: CPT | Performed by: SURGERY

## 2021-11-02 PROCEDURE — 1160F RVW MEDS BY RX/DR IN RCRD: CPT | Performed by: SURGERY

## 2021-11-16 ENCOUNTER — LAB REQUISITION (OUTPATIENT)
Dept: LAB | Facility: HOSPITAL | Age: 71
End: 2021-11-16
Payer: COMMERCIAL

## 2021-11-16 DIAGNOSIS — R19.7 DIARRHEA, UNSPECIFIED: ICD-10-CM

## 2021-11-16 DIAGNOSIS — R19.4 CHANGE IN BOWEL HABIT: ICD-10-CM

## 2021-11-16 DIAGNOSIS — K51.80 OTHER ULCERATIVE COLITIS WITHOUT COMPLICATIONS (HCC): ICD-10-CM

## 2021-11-16 PROCEDURE — 88305 TISSUE EXAM BY PATHOLOGIST: CPT | Performed by: PATHOLOGY

## 2021-11-29 ENCOUNTER — HOSPITAL ENCOUNTER (OUTPATIENT)
Dept: NON INVASIVE DIAGNOSTICS | Facility: CLINIC | Age: 71
Discharge: HOME | End: 2021-11-29
Payer: COMMERCIAL

## 2021-11-29 DIAGNOSIS — K52.9 COLITIS: ICD-10-CM

## 2021-11-29 PROCEDURE — 93975 VASCULAR STUDY: CPT | Performed by: SURGERY

## 2021-11-29 PROCEDURE — 93975 VASCULAR STUDY: CPT

## 2021-12-27 ENCOUNTER — VBI (OUTPATIENT)
Dept: ADMINISTRATIVE | Facility: OTHER | Age: 71
End: 2021-12-27

## 2022-01-05 ENCOUNTER — RA CDI HCC (OUTPATIENT)
Dept: OTHER | Facility: HOSPITAL | Age: 72
End: 2022-01-05

## 2022-01-05 ENCOUNTER — APPOINTMENT (OUTPATIENT)
Dept: LAB | Facility: CLINIC | Age: 72
End: 2022-01-05
Payer: COMMERCIAL

## 2022-01-05 DIAGNOSIS — R91.8 PULMONARY NODULES: ICD-10-CM

## 2022-01-05 DIAGNOSIS — R63.4 WEIGHT LOSS: ICD-10-CM

## 2022-01-05 DIAGNOSIS — I10 ESSENTIAL HYPERTENSION: ICD-10-CM

## 2022-01-05 DIAGNOSIS — I70.0 ABDOMINAL AORTIC ATHEROSCLEROSIS (HCC): ICD-10-CM

## 2022-01-05 DIAGNOSIS — N18.31 STAGE 3A CHRONIC KIDNEY DISEASE (HCC): ICD-10-CM

## 2022-01-05 DIAGNOSIS — E80.4 GILBERT'S SYNDROME: ICD-10-CM

## 2022-01-05 DIAGNOSIS — C64.1 MALIGNANT NEOPLASM OF RIGHT KIDNEY, EXCEPT RENAL PELVIS (HCC): ICD-10-CM

## 2022-01-05 DIAGNOSIS — N40.1 BENIGN PROSTATIC HYPERPLASIA WITH NOCTURIA: ICD-10-CM

## 2022-01-05 DIAGNOSIS — R73.9 HYPERGLYCEMIA: ICD-10-CM

## 2022-01-05 DIAGNOSIS — E03.9 ACQUIRED HYPOTHYROIDISM: ICD-10-CM

## 2022-01-05 DIAGNOSIS — R35.1 BENIGN PROSTATIC HYPERPLASIA WITH NOCTURIA: ICD-10-CM

## 2022-01-05 DIAGNOSIS — R26.9 GAIT DISTURBANCE: ICD-10-CM

## 2022-01-05 DIAGNOSIS — K63.5 POLYP OF COLON, UNSPECIFIED PART OF COLON, UNSPECIFIED TYPE: ICD-10-CM

## 2022-01-05 DIAGNOSIS — I71.4 ABDOMINAL AORTIC ANEURYSM (AAA) WITHOUT RUPTURE (HCC): ICD-10-CM

## 2022-01-05 DIAGNOSIS — E78.2 MIXED HYPERLIPIDEMIA: ICD-10-CM

## 2022-01-05 DIAGNOSIS — R19.8 CHANGE IN BOWEL MOVEMENT: ICD-10-CM

## 2022-01-05 DIAGNOSIS — K52.9 COLITIS: ICD-10-CM

## 2022-01-05 LAB
ALBUMIN SERPL BCP-MCNC: 3.8 G/DL (ref 3.5–5)
ALP SERPL-CCNC: 84 U/L (ref 46–116)
ALT SERPL W P-5'-P-CCNC: 27 U/L (ref 12–78)
ANION GAP SERPL CALCULATED.3IONS-SCNC: 3 MMOL/L (ref 4–13)
AST SERPL W P-5'-P-CCNC: 18 U/L (ref 5–45)
BILIRUB SERPL-MCNC: 0.91 MG/DL (ref 0.2–1)
BILIRUB UR QL STRIP: NEGATIVE
BUN SERPL-MCNC: 22 MG/DL (ref 5–25)
CALCIUM SERPL-MCNC: 9.8 MG/DL (ref 8.3–10.1)
CHLORIDE SERPL-SCNC: 107 MMOL/L (ref 100–108)
CLARITY UR: CLEAR
CO2 SERPL-SCNC: 31 MMOL/L (ref 21–32)
COLOR UR: YELLOW
CREAT SERPL-MCNC: 1.51 MG/DL (ref 0.6–1.3)
ERYTHROCYTE [DISTWIDTH] IN BLOOD BY AUTOMATED COUNT: 11.9 % (ref 11.6–15.1)
GFR SERPL CREATININE-BSD FRML MDRD: 45 ML/MIN/1.73SQ M
GLUCOSE P FAST SERPL-MCNC: 119 MG/DL (ref 65–99)
GLUCOSE UR STRIP-MCNC: NEGATIVE MG/DL
HCT VFR BLD AUTO: 45.1 % (ref 36.5–49.3)
HGB BLD-MCNC: 14.9 G/DL (ref 12–17)
HGB UR QL STRIP.AUTO: NEGATIVE
KETONES UR STRIP-MCNC: NEGATIVE MG/DL
LDLC SERPL DIRECT ASSAY-MCNC: 95 MG/DL (ref 0–100)
LEUKOCYTE ESTERASE UR QL STRIP: NEGATIVE
MCH RBC QN AUTO: 31 PG (ref 26.8–34.3)
MCHC RBC AUTO-ENTMCNC: 33 G/DL (ref 31.4–37.4)
MCV RBC AUTO: 94 FL (ref 82–98)
NITRITE UR QL STRIP: NEGATIVE
PH UR STRIP.AUTO: 7.5 [PH]
PLATELET # BLD AUTO: 295 THOUSANDS/UL (ref 149–390)
PMV BLD AUTO: 9.6 FL (ref 8.9–12.7)
POTASSIUM SERPL-SCNC: 5 MMOL/L (ref 3.5–5.3)
PROT SERPL-MCNC: 6.6 G/DL (ref 6.4–8.2)
PROT UR STRIP-MCNC: NEGATIVE MG/DL
RBC # BLD AUTO: 4.8 MILLION/UL (ref 3.88–5.62)
SODIUM SERPL-SCNC: 141 MMOL/L (ref 136–145)
SP GR UR STRIP.AUTO: 1.01 (ref 1–1.03)
T4 FREE SERPL-MCNC: 0.9 NG/DL (ref 0.76–1.46)
TSH SERPL DL<=0.05 MIU/L-ACNC: 3.92 UIU/ML (ref 0.36–3.74)
UROBILINOGEN UR QL STRIP.AUTO: 0.2 E.U./DL
WBC # BLD AUTO: 8.18 THOUSAND/UL (ref 4.31–10.16)

## 2022-01-05 PROCEDURE — 84443 ASSAY THYROID STIM HORMONE: CPT

## 2022-01-05 PROCEDURE — 84439 ASSAY OF FREE THYROXINE: CPT

## 2022-01-05 PROCEDURE — 83721 ASSAY OF BLOOD LIPOPROTEIN: CPT

## 2022-01-05 PROCEDURE — 85027 COMPLETE CBC AUTOMATED: CPT

## 2022-01-05 PROCEDURE — 36415 COLL VENOUS BLD VENIPUNCTURE: CPT

## 2022-01-05 PROCEDURE — 80053 COMPREHEN METABOLIC PANEL: CPT

## 2022-01-05 PROCEDURE — 81003 URINALYSIS AUTO W/O SCOPE: CPT

## 2022-01-05 NOTE — PROGRESS NOTES
[facility] [address]  coding opportunities          Number of diagnosis code(s) already on the problem list added to FYI fla               Number of suggestions used: 4         Patients insurance company: [address]  (Medicare Advantage and Commercial)     Visit status: Patient arrived for their scheduled appointment        [facility]  coding opportunities          Number of diagnosis code(s) already on the problem list added to FYI fla    Based on clinical documentation indicated in your record, it appears that the patient may have the following conditions;      I71 4 Abdominal aortic aneurysm without rupture ([address] )  I70 0 Abdominal aortic atherosclerosis ([address] )  N18 31 Chronic kidney disease stage 3a ([address] )  C64 1 Malignant neoplasm of right kidney, except renal pelvis ([address] )    If this is correct, please document and assess at your next visit  22                   Patients insurance company: [address]  (Medicare Advantage and Commercial)

## 2022-01-11 ENCOUNTER — OFFICE VISIT (OUTPATIENT)
Dept: FAMILY MEDICINE CLINIC | Facility: CLINIC | Age: 72
End: 2022-01-11
Payer: COMMERCIAL

## 2022-01-11 VITALS
DIASTOLIC BLOOD PRESSURE: 78 MMHG | HEIGHT: 77 IN | WEIGHT: 238 LBS | RESPIRATION RATE: 18 BRPM | HEART RATE: 68 BPM | SYSTOLIC BLOOD PRESSURE: 136 MMHG | OXYGEN SATURATION: 97 % | BODY MASS INDEX: 28.1 KG/M2

## 2022-01-11 DIAGNOSIS — I71.4 ABDOMINAL AORTIC ANEURYSM (AAA) WITHOUT RUPTURE (HCC): ICD-10-CM

## 2022-01-11 DIAGNOSIS — K63.5 POLYP OF COLON, UNSPECIFIED PART OF COLON, UNSPECIFIED TYPE: ICD-10-CM

## 2022-01-11 DIAGNOSIS — E78.2 MIXED HYPERLIPIDEMIA: ICD-10-CM

## 2022-01-11 DIAGNOSIS — R73.9 HYPERGLYCEMIA: ICD-10-CM

## 2022-01-11 DIAGNOSIS — E80.4 GILBERT'S SYNDROME: ICD-10-CM

## 2022-01-11 DIAGNOSIS — R91.8 PULMONARY NODULES: ICD-10-CM

## 2022-01-11 DIAGNOSIS — E03.9 ACQUIRED HYPOTHYROIDISM: Primary | ICD-10-CM

## 2022-01-11 DIAGNOSIS — I70.0 ABDOMINAL AORTIC ATHEROSCLEROSIS (HCC): ICD-10-CM

## 2022-01-11 DIAGNOSIS — I10 ESSENTIAL HYPERTENSION: ICD-10-CM

## 2022-01-11 DIAGNOSIS — C64.1 MALIGNANT NEOPLASM OF RIGHT KIDNEY, EXCEPT RENAL PELVIS (HCC): ICD-10-CM

## 2022-01-11 DIAGNOSIS — N18.31 STAGE 3A CHRONIC KIDNEY DISEASE (HCC): ICD-10-CM

## 2022-01-11 PROCEDURE — 99214 OFFICE O/P EST MOD 30 MIN: CPT | Performed by: FAMILY MEDICINE

## 2022-01-11 PROCEDURE — 3078F DIAST BP <80 MM HG: CPT | Performed by: FAMILY MEDICINE

## 2022-01-11 PROCEDURE — 3075F SYST BP GE 130 - 139MM HG: CPT | Performed by: FAMILY MEDICINE

## 2022-01-11 PROCEDURE — 1003F LEVEL OF ACTIVITY ASSESS: CPT | Performed by: FAMILY MEDICINE

## 2022-01-11 PROCEDURE — 1160F RVW MEDS BY RX/DR IN RCRD: CPT | Performed by: FAMILY MEDICINE

## 2022-01-11 PROCEDURE — 3008F BODY MASS INDEX DOCD: CPT | Performed by: FAMILY MEDICINE

## 2022-01-11 NOTE — PROGRESS NOTES
Assessment and Plan:  1  Hypothyroidism, stable continue present therapy  2  Colon polyp up-to-date with colonoscopy  3  Abdominal aortic aneurysm, stable follow-up vascular surgery  4  Abdominal aortic atherosclerosis follow-up vascular surgery and Plavix/statin therapy  5  Hypertension, stable continue present therapy  6  Right renal cancer, status post nephrectomy follows with Urology  7  Pulmonary nodules, radiology recommended 3 months repeat  Patient declined because is going to call for any however will complete in March when he comes back  8  Gilbert's syndrome, continue monitor bilirubin  9  Hyperglycemia diet-controlled  10  Hyperlipidemia, stable Crestor 5 mg daily  11   Patient to return in 6 months for office visit blood work sooner if need      Problem List Items Addressed This Visit        Digestive    Colon polyp     Up-to-date with colonoscopy         Relevant Orders    CBC    Comprehensive metabolic panel    Hemoglobin A1C    Lipid Panel with Direct LDL reflex    TSH, 3rd generation with Free T4 reflex    UA (URINE) with reflex to Scope       Endocrine    Acquired hypothyroidism - Primary     Stable continue present therapy         Relevant Orders    CBC    Comprehensive metabolic panel    Hemoglobin A1C    Lipid Panel with Direct LDL reflex    TSH, 3rd generation with Free T4 reflex    UA (URINE) with reflex to Scope       Cardiovascular and Mediastinum    Essential hypertension     Stable continue present therapy         Relevant Orders    CBC    Comprehensive metabolic panel    Hemoglobin A1C    Lipid Panel with Direct LDL reflex    TSH, 3rd generation with Free T4 reflex    UA (URINE) with reflex to Scope    Abdominal aortic aneurysm (AAA) without rupture (HCC)     Stable follows with vascular surgery         Relevant Orders    CBC    Comprehensive metabolic panel    Hemoglobin A1C    Lipid Panel with Direct LDL reflex    TSH, 3rd generation with Free T4 reflex    UA (URINE) with reflex to Scope    Abdominal aortic atherosclerosis ([facility] )     Patient is on Plavix/statin therapy, follow-up vascular surgery         Relevant Orders    CBC    Comprehensive metabolic panel    Hemoglobin A1C    Lipid Panel with Direct LDL reflex    TSH, 3rd generation with Free T4 reflex    UA (URINE) with reflex to Scope       Genitourinary    Stage 3a chronic kidney disease ([facility] )     Lab Results   Component Value Date    EGFR 45 01/05/2022    EGFR 46 09/24/2021    EGFR 46 03/18/2021    CREATININE 1 51 (H) 01/05/2022    CREATININE 1 50 (H) 09/24/2021    CREATININE 1 51 (H) 03/18/2021   Stable on Arb         Relevant Orders    CBC    Comprehensive metabolic panel    Hemoglobin A1C    Lipid Panel with Direct LDL reflex    TSH, 3rd generation with Free T4 reflex    UA (URINE) with reflex to Scope    Malignant neoplasm of right kidney, except renal pelvis ([facility] )     Follows with Urology CT chest ordered see below         Relevant Orders    CT chest wo contrast    CBC    Comprehensive metabolic panel    Hemoglobin A1C    Lipid Panel with Direct LDL reflex    TSH, 3rd generation with Free T4 reflex    UA (URINE) with reflex to Scope       Other    Hyperglycemia     Diet controlled         Relevant Orders    CBC    Comprehensive metabolic panel    Hemoglobin A1C    Lipid Panel with Direct LDL reflex    TSH, 3rd generation with Free T4 reflex    UA (URINE) with reflex to Scope    Mixed hyperlipidemia     Stable on Crestor 5 mg daily         Relevant Orders    CBC    Comprehensive metabolic panel    Hemoglobin A1C    Lipid Panel with Direct LDL reflex    TSH, 3rd generation with Free T4 reflex    UA (URINE) with reflex to Scope    Gilbert's syndrome     Continue to monitor bilirubin         Relevant Orders    CBC    Comprehensive metabolic panel    Hemoglobin A1C    Lipid Panel with Direct LDL reflex    TSH, 3rd generation with Free T4 reflex    UA (URINE) with reflex to Scope    Pulmonary nodules     Patient leaving for [place] patient agrees to complete CT scan in March         Relevant Orders    CT chest wo contrast    CBC    Comprehensive metabolic panel    Hemoglobin A1C    Lipid Panel with Direct LDL reflex    TSH, 3rd generation with Free T4 reflex    UA (URINE) with reflex to Scope                 Diagnoses and all orders for this visit:    Acquired hypothyroidism  -     CBC; Future  -     Comprehensive metabolic panel; Future  -     Hemoglobin A1C; Future  -     Lipid Panel with Direct LDL reflex; Future  -     TSH, 3rd generation with Free T4 reflex; Future  -     UA (URINE) with reflex to Scope; Future    Polyp of colon, unspecified part of colon, unspecified type  -     CBC; Future  -     Comprehensive metabolic panel; Future  -     Hemoglobin A1C; Future  -     Lipid Panel with Direct LDL reflex; Future  -     TSH, 3rd generation with Free T4 reflex; Future  -     UA (URINE) with reflex to Scope; Future    Abdominal aortic aneurysm (AAA) without rupture (HCC)  -     CBC; Future  -     Comprehensive metabolic panel; Future  -     Hemoglobin A1C; Future  -     Lipid Panel with Direct LDL reflex; Future  -     TSH, 3rd generation with Free T4 reflex; Future  -     UA (URINE) with reflex to Scope; Future    Abdominal aortic atherosclerosis (HCC)  -     CBC; Future  -     Comprehensive metabolic panel; Future  -     Hemoglobin A1C; Future  -     Lipid Panel with Direct LDL reflex; Future  -     TSH, 3rd generation with Free T4 reflex; Future  -     UA (URINE) with reflex to Scope; Future    Essential hypertension  -     CBC; Future  -     Comprehensive metabolic panel; Future  -     Hemoglobin A1C; Future  -     Lipid Panel with Direct LDL reflex; Future  -     TSH, 3rd generation with Free T4 reflex; Future  -     UA (URINE) with reflex to Scope; Future    Malignant neoplasm of right kidney, except renal pelvis (HCC)  -     CT chest wo contrast; Future  -     CBC; Future  -     Comprehensive metabolic panel;  Future  - Hemoglobin A1C; Future  -     Lipid Panel with Direct LDL reflex; Future  -     TSH, 3rd generation with Free T4 reflex; Future  -     UA (URINE) with reflex to Scope; Future    Stage 3a chronic kidney disease (HCC)  -     CBC; Future  -     Comprehensive metabolic panel; Future  -     Hemoglobin A1C; Future  -     Lipid Panel with Direct LDL reflex; Future  -     TSH, 3rd generation with Free T4 reflex; Future  -     UA (URINE) with reflex to Scope; Future    Gilbert's syndrome  -     CBC; Future  -     Comprehensive metabolic panel; Future  -     Hemoglobin A1C; Future  -     Lipid Panel with Direct LDL reflex; Future  -     TSH, 3rd generation with Free T4 reflex; Future  -     UA (URINE) with reflex to Scope; Future    Hyperglycemia  -     CBC; Future  -     Comprehensive metabolic panel; Future  -     Hemoglobin A1C; Future  -     Lipid Panel with Direct LDL reflex; Future  -     TSH, 3rd generation with Free T4 reflex; Future  -     UA (URINE) with reflex to Scope; Future    Mixed hyperlipidemia  -     CBC; Future  -     Comprehensive metabolic panel; Future  -     Hemoglobin A1C; Future  -     Lipid Panel with Direct LDL reflex; Future  -     TSH, 3rd generation with Free T4 reflex; Future  -     UA (URINE) with reflex to Scope; Future    Pulmonary nodules  -     CT chest wo contrast; Future  -     CBC; Future  -     Comprehensive metabolic panel; Future  -     Hemoglobin A1C; Future  -     Lipid Panel with Direct LDL reflex; Future  -     TSH, 3rd generation with Free T4 reflex; Future  -     UA (URINE) with reflex to Scope; Future              Subjective:      Patient ID: King Barton is a 66 y o  male  CC:    Chief Complaint   Patient presents with    Follow-up     2 month       HPI:    Patient doing well without any medical complaints concerns the present time    Blood work was discussed with the patient      The following portions of the patient's history were reviewed and updated as appropriate: allergies, current medications, past family history, past medical history, past social history, past surgical history and problem list       Review of Systems   Constitutional: Negative  HENT: Negative  Eyes: Negative  Respiratory: Negative  Cardiovascular: Negative  Gastrointestinal: Negative  Endocrine: Negative  Genitourinary: Negative  Musculoskeletal: Negative  Skin: Negative  Allergic/Immunologic: Negative  Neurological: Negative  Hematological: Negative  Psychiatric/Behavioral: Negative  Data to review:       Objective:    Vitals:    01/11/22 0822   BP: 136/78   BP Location: Left arm   Patient Position: Sitting   Cuff Size: Adult   Pulse: 68   Resp: 18   SpO2: 97%   Weight: 108 kg (238 lb)   Height: 6' 5" (1 956 m)        Physical Exam  Vitals and nursing note reviewed  Constitutional:       Appearance: Normal appearance  Eyes:      General: No scleral icterus  Neck:      Vascular: No carotid bruit  Cardiovascular:      Rate and Rhythm: Normal rate and regular rhythm  Heart sounds: Normal heart sounds  Pulmonary:      Effort: Pulmonary effort is normal       Breath sounds: Normal breath sounds  Abdominal:      General: Bowel sounds are normal       Palpations: Abdomen is soft  Tenderness: There is no abdominal tenderness  Musculoskeletal:      Cervical back: Neck supple  Right lower leg: No edema  Left lower leg: No edema  Skin:     General: Skin is warm and dry  Neurological:      General: No focal deficit present  Mental Status: He is alert  Psychiatric:         Mood and Affect: Mood normal            BMI Counseling: Body mass index is 28 22 kg/m²  The BMI is above normal  Nutrition recommendations include moderation in carbohydrate intake and reducing intake of cholesterol  Exercise recommendations include exercising 3-5 times per week  Rationale for BMI follow-up plan is due to patient being overweight or obese

## 2022-01-11 NOTE — PATIENT INSTRUCTIONS
Complete CT follow-up of chest in March for lung nodules  Diet exercise weight loss recommended  Patient to return in 6 months for office visit and blood work  Follow specialist per their instructions

## 2022-02-21 ENCOUNTER — OFFICE VISIT (OUTPATIENT)
Dept: FAMILY MEDICINE CLINIC | Facility: CLINIC | Age: 72
End: 2022-02-21
Payer: COMMERCIAL

## 2022-02-21 VITALS
TEMPERATURE: 98 F | HEART RATE: 76 BPM | DIASTOLIC BLOOD PRESSURE: 74 MMHG | HEIGHT: 77 IN | SYSTOLIC BLOOD PRESSURE: 114 MMHG | WEIGHT: 235 LBS | BODY MASS INDEX: 27.75 KG/M2

## 2022-02-21 DIAGNOSIS — M25.511 CHRONIC RIGHT SHOULDER PAIN: ICD-10-CM

## 2022-02-21 DIAGNOSIS — N18.31 STAGE 3A CHRONIC KIDNEY DISEASE (HCC): ICD-10-CM

## 2022-02-21 DIAGNOSIS — M75.111 INCOMPLETE TEAR OF RIGHT ROTATOR CUFF, UNSPECIFIED WHETHER TRAUMATIC: ICD-10-CM

## 2022-02-21 DIAGNOSIS — I10 ESSENTIAL HYPERTENSION: ICD-10-CM

## 2022-02-21 DIAGNOSIS — Z01.818 PREOPERATIVE CLEARANCE: Primary | ICD-10-CM

## 2022-02-21 DIAGNOSIS — M67.921 TENDINOPATHY OF RIGHT BICEPS TENDON: ICD-10-CM

## 2022-02-21 DIAGNOSIS — G89.29 CHRONIC RIGHT SHOULDER PAIN: ICD-10-CM

## 2022-02-21 DIAGNOSIS — I70.0 ABDOMINAL AORTIC ATHEROSCLEROSIS (HCC): ICD-10-CM

## 2022-02-21 DIAGNOSIS — I71.4 ABDOMINAL AORTIC ANEURYSM (AAA) WITHOUT RUPTURE (HCC): ICD-10-CM

## 2022-02-21 PROCEDURE — 99214 OFFICE O/P EST MOD 30 MIN: CPT | Performed by: FAMILY MEDICINE

## 2022-02-21 PROCEDURE — 3078F DIAST BP <80 MM HG: CPT | Performed by: FAMILY MEDICINE

## 2022-02-21 PROCEDURE — 1160F RVW MEDS BY RX/DR IN RCRD: CPT | Performed by: FAMILY MEDICINE

## 2022-02-21 PROCEDURE — 3008F BODY MASS INDEX DOCD: CPT | Performed by: FAMILY MEDICINE

## 2022-02-21 PROCEDURE — 1036F TOBACCO NON-USER: CPT | Performed by: FAMILY MEDICINE

## 2022-02-21 PROCEDURE — 3074F SYST BP LT 130 MM HG: CPT | Performed by: FAMILY MEDICINE

## 2022-02-21 NOTE — PROGRESS NOTES
Assessment and Plan:   1  Preoperative clearance, patient was examined patient is stable for upcoming surgery  Full clearance is pending results of preoperative blood work in EKG  Addendum 02/23/2022  Preoperative blood work in EKG reviewed  Patient is stable and fully cleared for upcoming surgery  2  Right rotator cuff tear/tendinopathy right biceps/chronic right shoulder pain, patient is for arthroscopic rotator cuff repair/show acromial decompression/biceps tenotomy on 02/24/2022 at [facility] with orthopedic surgeon Dr Radha Ramos  3  Hypertension, stable continue present therapy  4  Abdominal aortic aneurysm, 4/21, aneurysm 3 3   follow-up vascular surgery  5  Abdominal aortic atherosclerosis, stable on Plavix/statin follows with vascular surgery   6  CKD-3, is on Arb, preoperative blood work pending  7   Return at scheduled appointment sooner if needed    Problem List Items Addressed This Visit        Cardiovascular and Mediastinum    Essential hypertension     Stable continue present therapy         Abdominal aortic aneurysm (AAA) without rupture ([address] )     Stable follows with vascular surgery ultrasound 4/21, 3 3 cm         Abdominal aortic atherosclerosis ([address] )     On Plavix/statin therapy follows with vascular surgery            Genitourinary    Stage 3a chronic kidney disease ([address] )     Lab Results   Component Value Date    EGFR 45 01/05/2022    EGFR 46 09/24/2021    EGFR 46 03/18/2021    CREATININE 1 51 (H) 01/05/2022    CREATININE 1 50 (H) 09/24/2021    CREATININE 1 51 (H) 03/18/2021   Preop blood work pending           Other Visit Diagnoses     Preoperative clearance    -  Primary    Incomplete tear of right rotator cuff, unspecified whether traumatic        Tendinopathy of right biceps tendon        Chronic right shoulder pain                     Diagnoses and all orders for this visit:    Preoperative clearance    Incomplete tear of right rotator cuff, unspecified whether traumatic    Tendinopathy of right biceps tendon    Chronic right shoulder pain    Essential hypertension    Abdominal aortic aneurysm (AAA) without rupture (HCC)    Abdominal aortic atherosclerosis (HCC)    Stage 3a chronic kidney disease (HCC)              Subjective:      Patient ID: Shanika Perez is a 66 y o  male  CC:    Chief Complaint   Patient presents with    Pre-op Exam     Right shoulder arthroscopy rotator cuff repair scheduled this Thursday with [facility]       HPI:    Patient will be having arthroscopic right shoulder surgery 02/24/2022 at [facility]   He will having right rotator cuff repair/subacromial decompression/biceps tendinopathy  With orthopedic surgeon Dr Jazmin Yo  Patient will be going for his preoperative EKG and blood work this afternoon at the hospital      The following portions of the patient's history were reviewed and updated as appropriate: allergies, current medications, past family history, past medical history, past social history, past surgical history and problem list       Review of Systems   Constitutional: Negative  HENT: Negative  Eyes: Negative  Respiratory: Negative  Cardiovascular: Negative  Gastrointestinal: Negative  Endocrine: Negative  Genitourinary: Negative  Musculoskeletal:        HPI   Skin: Negative  Allergic/Immunologic: Negative  Neurological: Negative  Hematological: Negative  Psychiatric/Behavioral: Negative  Data to review:       Objective:    Vitals:    02/21/22 0928   BP: 114/74   Pulse: 76   Temp: 98 °F (36 7 °C)   Weight: 107 kg (235 lb)   Height: 6' 5" (1 956 m)        Physical Exam  Vitals and nursing note reviewed  Constitutional:       Appearance: Normal appearance  HENT:      Head: Normocephalic and atraumatic        Right Ear: Tympanic membrane normal       Left Ear: Tympanic membrane normal       Nose: Nose normal       Mouth/Throat:      Mouth: Mucous membranes are moist       Pharynx: Oropharynx is clear  No oropharyngeal exudate or posterior oropharyngeal erythema  Eyes:      General: No scleral icterus  Neck:      Vascular: No carotid bruit  Cardiovascular:      Rate and Rhythm: Normal rate and regular rhythm  Heart sounds: Normal heart sounds  Pulmonary:      Effort: Pulmonary effort is normal       Breath sounds: Normal breath sounds  Abdominal:      General: Bowel sounds are normal       Palpations: Abdomen is soft  Tenderness: There is no abdominal tenderness  Musculoskeletal:      Cervical back: Neck supple  Right lower leg: No edema  Left lower leg: No edema  Skin:     General: Skin is warm and dry  Neurological:      General: No focal deficit present  Mental Status: He is alert     Psychiatric:         Mood and Affect: Mood normal

## 2022-02-21 NOTE — ASSESSMENT & PLAN NOTE
Lab Results   Component Value Date    EGFR 45 01/05/2022    EGFR 46 09/24/2021    EGFR 46 03/18/2021    CREATININE 1 51 (H) 01/05/2022    CREATININE 1 50 (H) 09/24/2021    CREATININE 1 51 (H) 03/18/2021   Preop blood work pending

## 2022-02-23 ENCOUNTER — TELEPHONE (OUTPATIENT)
Dept: FAMILY MEDICINE CLINIC | Facility: CLINIC | Age: 72
End: 2022-02-23

## 2022-02-23 NOTE — TELEPHONE ENCOUNTER
Preoperative blood work has been reviewed    However I need a copy of his preoperative EKG for final clearance

## 2022-02-23 NOTE — TELEPHONE ENCOUNTER
Alicia Arriaga from dr pearson's office called in stating that the patient is having surgery tomorrow and was in the office on 02/21/2022 for a pre op clearance  Dr Dexter Drummond didn't finalize the clearance as he was waiting on the results of the lab which are now finalized in epic  Alicia Arriaga would like for dr Dexter Drummond to finish this in order for patient to receive his surgery tomorrow  Alicia Arriaga can be reached at [phone], fax number [phone]  Please advise   Thank you

## 2022-04-11 DIAGNOSIS — E03.9 HYPOTHYROIDISM, UNSPECIFIED TYPE: ICD-10-CM

## 2022-04-11 DIAGNOSIS — I10 ESSENTIAL HYPERTENSION: ICD-10-CM

## 2022-04-11 RX ORDER — IRBESARTAN 150 MG/1
150 TABLET ORAL
Qty: 90 TABLET | Refills: 1 | Status: SHIPPED | OUTPATIENT
Start: 2022-04-11

## 2022-04-11 RX ORDER — LEVOTHYROXINE SODIUM 0.03 MG/1
25 TABLET ORAL
Qty: 90 TABLET | Refills: 1 | Status: SHIPPED | OUTPATIENT
Start: 2022-04-11 | End: 2022-07-18 | Stop reason: ALTCHOICE

## 2022-04-11 NOTE — TELEPHONE ENCOUNTER
Last OV with Office: 2/21/2022  Last visit with PCP : Visit date not found      Next visit with the Office :7/19/2022   Next visit with PCP : 7/19/2022    Pt called for refill [facility]

## 2022-04-28 ENCOUNTER — HOSPITAL ENCOUNTER (OUTPATIENT)
Dept: NON INVASIVE DIAGNOSTICS | Facility: CLINIC | Age: 72
Discharge: HOME | End: 2022-04-28
Payer: COMMERCIAL

## 2022-04-28 DIAGNOSIS — I71.4 ABDOMINAL AORTIC ANEURYSM (AAA) WITHOUT RUPTURE (HCC): ICD-10-CM

## 2022-04-28 PROCEDURE — 93978 VASCULAR STUDY: CPT

## 2022-04-28 PROCEDURE — 93923 UPR/LXTR ART STDY 3+ LVLS: CPT

## 2022-07-14 ENCOUNTER — APPOINTMENT (OUTPATIENT)
Dept: LAB | Facility: CLINIC | Age: 72
End: 2022-07-14
Payer: COMMERCIAL

## 2022-07-14 DIAGNOSIS — K63.5 POLYP OF COLON, UNSPECIFIED PART OF COLON, UNSPECIFIED TYPE: ICD-10-CM

## 2022-07-14 DIAGNOSIS — E78.2 MIXED HYPERLIPIDEMIA: ICD-10-CM

## 2022-07-14 DIAGNOSIS — R73.9 HYPERGLYCEMIA: ICD-10-CM

## 2022-07-14 DIAGNOSIS — I70.0 ABDOMINAL AORTIC ATHEROSCLEROSIS (HCC): ICD-10-CM

## 2022-07-14 DIAGNOSIS — R91.8 PULMONARY NODULES: ICD-10-CM

## 2022-07-14 DIAGNOSIS — C64.1 MALIGNANT NEOPLASM OF RIGHT KIDNEY, EXCEPT RENAL PELVIS (HCC): ICD-10-CM

## 2022-07-14 DIAGNOSIS — I10 ESSENTIAL HYPERTENSION: ICD-10-CM

## 2022-07-14 DIAGNOSIS — I71.40 ABDOMINAL AORTIC ANEURYSM (AAA) WITHOUT RUPTURE: ICD-10-CM

## 2022-07-14 DIAGNOSIS — E80.4 GILBERT'S SYNDROME: ICD-10-CM

## 2022-07-14 DIAGNOSIS — N18.31 STAGE 3A CHRONIC KIDNEY DISEASE (HCC): ICD-10-CM

## 2022-07-14 DIAGNOSIS — E03.9 ACQUIRED HYPOTHYROIDISM: ICD-10-CM

## 2022-07-14 LAB
ALBUMIN SERPL BCP-MCNC: 3.7 G/DL (ref 3.5–5)
ALP SERPL-CCNC: 70 U/L (ref 46–116)
ALT SERPL W P-5'-P-CCNC: 24 U/L (ref 12–78)
ANION GAP SERPL CALCULATED.3IONS-SCNC: 3 MMOL/L (ref 4–13)
AST SERPL W P-5'-P-CCNC: 15 U/L (ref 5–45)
BILIRUB SERPL-MCNC: 1.22 MG/DL (ref 0.2–1)
BILIRUB UR QL STRIP: NEGATIVE
BUN SERPL-MCNC: 20 MG/DL (ref 5–25)
CALCIUM SERPL-MCNC: 9.6 MG/DL (ref 8.3–10.1)
CHLORIDE SERPL-SCNC: 109 MMOL/L (ref 100–108)
CHOLEST SERPL-MCNC: 172 MG/DL
CLARITY UR: CLEAR
CO2 SERPL-SCNC: 28 MMOL/L (ref 21–32)
COLOR UR: NORMAL
CREAT SERPL-MCNC: 1.35 MG/DL (ref 0.6–1.3)
ERYTHROCYTE [DISTWIDTH] IN BLOOD BY AUTOMATED COUNT: 12 % (ref 11.6–15.1)
EST. AVERAGE GLUCOSE BLD GHB EST-MCNC: 114 MG/DL
GFR SERPL CREATININE-BSD FRML MDRD: 52 ML/MIN/1.73SQ M
GLUCOSE P FAST SERPL-MCNC: 109 MG/DL (ref 65–99)
GLUCOSE UR STRIP-MCNC: NEGATIVE MG/DL
HBA1C MFR BLD: 5.6 %
HCT VFR BLD AUTO: 40.3 % (ref 36.5–49.3)
HDLC SERPL-MCNC: 36 MG/DL
HGB BLD-MCNC: 13.6 G/DL (ref 12–17)
HGB UR QL STRIP.AUTO: NEGATIVE
KETONES UR STRIP-MCNC: NEGATIVE MG/DL
LDLC SERPL CALC-MCNC: 102 MG/DL (ref 0–100)
LEUKOCYTE ESTERASE UR QL STRIP: NEGATIVE
MCH RBC QN AUTO: 30.2 PG (ref 26.8–34.3)
MCHC RBC AUTO-ENTMCNC: 33.7 G/DL (ref 31.4–37.4)
MCV RBC AUTO: 90 FL (ref 82–98)
NITRITE UR QL STRIP: NEGATIVE
PH UR STRIP.AUTO: 6.5 [PH]
PLATELET # BLD AUTO: 314 THOUSANDS/UL (ref 149–390)
PMV BLD AUTO: 9.6 FL (ref 8.9–12.7)
POTASSIUM SERPL-SCNC: 4 MMOL/L (ref 3.5–5.3)
PROT SERPL-MCNC: 6.5 G/DL (ref 6.4–8.2)
PROT UR STRIP-MCNC: NEGATIVE MG/DL
RBC # BLD AUTO: 4.5 MILLION/UL (ref 3.88–5.62)
SODIUM SERPL-SCNC: 140 MMOL/L (ref 136–145)
SP GR UR STRIP.AUTO: 1.01 (ref 1–1.03)
TRIGL SERPL-MCNC: 169 MG/DL
TSH SERPL DL<=0.05 MIU/L-ACNC: 4.24 UIU/ML (ref 0.45–4.5)
UROBILINOGEN UR STRIP-ACNC: <2 MG/DL
WBC # BLD AUTO: 9.06 THOUSAND/UL (ref 4.31–10.16)

## 2022-07-14 PROCEDURE — 80061 LIPID PANEL: CPT

## 2022-07-14 PROCEDURE — 81003 URINALYSIS AUTO W/O SCOPE: CPT

## 2022-07-14 PROCEDURE — 84443 ASSAY THYROID STIM HORMONE: CPT

## 2022-07-14 PROCEDURE — 85027 COMPLETE CBC AUTOMATED: CPT

## 2022-07-14 PROCEDURE — 80053 COMPREHEN METABOLIC PANEL: CPT

## 2022-07-14 PROCEDURE — 36415 COLL VENOUS BLD VENIPUNCTURE: CPT

## 2022-07-14 PROCEDURE — 83036 HEMOGLOBIN GLYCOSYLATED A1C: CPT

## 2022-07-18 ENCOUNTER — APPOINTMENT (EMERGENCY)
Dept: RADIOLOGY | Facility: HOSPITAL | Age: 72
End: 2022-07-18
Payer: COMMERCIAL

## 2022-07-18 ENCOUNTER — HOSPITAL ENCOUNTER (OUTPATIENT)
Facility: HOSPITAL | Age: 72
Setting detail: OBSERVATION
Discharge: HOME | End: 2022-07-19
Attending: EMERGENCY MEDICINE | Admitting: INTERNAL MEDICINE
Payer: COMMERCIAL

## 2022-07-18 ENCOUNTER — OFFICE VISIT (OUTPATIENT)
Dept: URGENT CARE | Facility: MEDICAL CENTER | Age: 72
End: 2022-07-18
Payer: COMMERCIAL

## 2022-07-18 VITALS
OXYGEN SATURATION: 98 % | WEIGHT: 235 LBS | DIASTOLIC BLOOD PRESSURE: 97 MMHG | BODY MASS INDEX: 27.87 KG/M2 | SYSTOLIC BLOOD PRESSURE: 153 MMHG | RESPIRATION RATE: 18 BRPM | HEART RATE: 144 BPM | TEMPERATURE: 96.5 F

## 2022-07-18 DIAGNOSIS — I48.91 ATRIAL FIBRILLATION, NEW ONSET (HCC): Primary | ICD-10-CM

## 2022-07-18 DIAGNOSIS — E78.2 MIXED HYPERLIPIDEMIA: ICD-10-CM

## 2022-07-18 DIAGNOSIS — R77.8 ELEVATED TROPONIN: ICD-10-CM

## 2022-07-18 DIAGNOSIS — I48.91 NEW ONSET ATRIAL FIBRILLATION (HCC): Primary | ICD-10-CM

## 2022-07-18 PROBLEM — R79.89 ELEVATED D-DIMER: Status: ACTIVE | Noted: 2022-07-18

## 2022-07-18 LAB
2HR DELTA HS TROPONIN: 17 NG/L
4HR DELTA HS TROPONIN: 32 NG/L
ANION GAP SERPL CALCULATED.3IONS-SCNC: 11 MMOL/L (ref 4–13)
ATRIAL RATE: 110 BPM
ATRIAL RATE: 54 BPM
ATRIAL RATE: 66 BPM
ATRIAL RATE: 82 BPM
BASOPHILS # BLD AUTO: 0.07 THOUSANDS/ΜL (ref 0–0.1)
BASOPHILS NFR BLD AUTO: 1 % (ref 0–1)
BUN SERPL-MCNC: 22 MG/DL (ref 5–25)
CALCIUM SERPL-MCNC: 9.6 MG/DL (ref 8.3–10.1)
CARDIAC TROPONIN I PNL SERPL HS: 11 NG/L
CARDIAC TROPONIN I PNL SERPL HS: 28 NG/L
CARDIAC TROPONIN I PNL SERPL HS: 43 NG/L
CHLORIDE SERPL-SCNC: 105 MMOL/L (ref 96–108)
CO2 SERPL-SCNC: 27 MMOL/L (ref 21–32)
CREAT SERPL-MCNC: 1.39 MG/DL (ref 0.6–1.3)
D DIMER PPP FEU-MCNC: 0.58 UG/ML FEU
EOSINOPHIL # BLD AUTO: 0.24 THOUSAND/ΜL (ref 0–0.61)
EOSINOPHIL NFR BLD AUTO: 3 % (ref 0–6)
ERYTHROCYTE [DISTWIDTH] IN BLOOD BY AUTOMATED COUNT: 11.9 % (ref 11.6–15.1)
FLUAV RNA RESP QL NAA+PROBE: NEGATIVE
FLUBV RNA RESP QL NAA+PROBE: NEGATIVE
GFR SERPL CREATININE-BSD FRML MDRD: 50 ML/MIN/1.73SQ M
GLUCOSE SERPL-MCNC: 123 MG/DL (ref 65–140)
HCT VFR BLD AUTO: 41.6 % (ref 36.5–49.3)
HGB BLD-MCNC: 14 G/DL (ref 12–17)
IMM GRANULOCYTES # BLD AUTO: 0.05 THOUSAND/UL (ref 0–0.2)
IMM GRANULOCYTES NFR BLD AUTO: 1 % (ref 0–2)
LYMPHOCYTES # BLD AUTO: 1.21 THOUSANDS/ΜL (ref 0.6–4.47)
LYMPHOCYTES NFR BLD AUTO: 13 % (ref 14–44)
MCH RBC QN AUTO: 30.1 PG (ref 26.8–34.3)
MCHC RBC AUTO-ENTMCNC: 33.7 G/DL (ref 31.4–37.4)
MCV RBC AUTO: 90 FL (ref 82–98)
MONOCYTES # BLD AUTO: 0.76 THOUSAND/ΜL (ref 0.17–1.22)
MONOCYTES NFR BLD AUTO: 8 % (ref 4–12)
NEUTROPHILS # BLD AUTO: 7.05 THOUSANDS/ΜL (ref 1.85–7.62)
NEUTS SEG NFR BLD AUTO: 74 % (ref 43–75)
NRBC BLD AUTO-RTO: 0 /100 WBCS
NT-PROBNP SERPL-MCNC: 249 PG/ML
P AXIS: 22 DEGREES
P AXIS: 65 DEGREES
P AXIS: 75 DEGREES
PLATELET # BLD AUTO: 285 THOUSANDS/UL (ref 149–390)
PMV BLD AUTO: 9.3 FL (ref 8.9–12.7)
POTASSIUM SERPL-SCNC: 3.6 MMOL/L (ref 3.5–5.3)
PR INTERVAL: 166 MS
PR INTERVAL: 190 MS
PR INTERVAL: 192 MS
QRS AXIS: -1 DEGREES
QRS AXIS: -32 DEGREES
QRS AXIS: -37 DEGREES
QRS AXIS: -40 DEGREES
QRSD INTERVAL: 80 MS
QRSD INTERVAL: 86 MS
QRSD INTERVAL: 88 MS
QRSD INTERVAL: 96 MS
QT INTERVAL: 288 MS
QT INTERVAL: 350 MS
QT INTERVAL: 376 MS
QT INTERVAL: 442 MS
QTC INTERVAL: 394 MS
QTC INTERVAL: 401 MS
QTC INTERVAL: 419 MS
QTC INTERVAL: 448 MS
RBC # BLD AUTO: 4.65 MILLION/UL (ref 3.88–5.62)
RSV RNA RESP QL NAA+PROBE: NEGATIVE
SARS-COV-2 RNA RESP QL NAA+PROBE: NEGATIVE
SODIUM SERPL-SCNC: 143 MMOL/L (ref 135–147)
T WAVE AXIS: 31 DEGREES
T WAVE AXIS: 37 DEGREES
T WAVE AXIS: 42 DEGREES
T WAVE AXIS: 61 DEGREES
VENTRICULAR RATE: 146 BPM
VENTRICULAR RATE: 54 BPM
VENTRICULAR RATE: 66 BPM
VENTRICULAR RATE: 79 BPM
WBC # BLD AUTO: 9.38 THOUSAND/UL (ref 4.31–10.16)

## 2022-07-18 PROCEDURE — 85025 COMPLETE CBC W/AUTO DIFF WBC: CPT

## 2022-07-18 PROCEDURE — 99213 OFFICE O/P EST LOW 20 MIN: CPT | Performed by: PHYSICIAN ASSISTANT

## 2022-07-18 PROCEDURE — 96360 HYDRATION IV INFUSION INIT: CPT

## 2022-07-18 PROCEDURE — 36415 COLL VENOUS BLD VENIPUNCTURE: CPT

## 2022-07-18 PROCEDURE — 93005 ELECTROCARDIOGRAM TRACING: CPT

## 2022-07-18 PROCEDURE — 71045 X-RAY EXAM CHEST 1 VIEW: CPT

## 2022-07-18 PROCEDURE — 93005 ELECTROCARDIOGRAM TRACING: CPT | Performed by: PHYSICIAN ASSISTANT

## 2022-07-18 PROCEDURE — 85379 FIBRIN DEGRADATION QUANT: CPT

## 2022-07-18 PROCEDURE — 99285 EMERGENCY DEPT VISIT HI MDM: CPT | Performed by: EMERGENCY MEDICINE

## 2022-07-18 PROCEDURE — 93010 ELECTROCARDIOGRAM REPORT: CPT | Performed by: INTERNAL MEDICINE

## 2022-07-18 PROCEDURE — 84484 ASSAY OF TROPONIN QUANT: CPT

## 2022-07-18 PROCEDURE — 80048 BASIC METABOLIC PNL TOTAL CA: CPT

## 2022-07-18 PROCEDURE — S9083 URGENT CARE CENTER GLOBAL: HCPCS | Performed by: PHYSICIAN ASSISTANT

## 2022-07-18 PROCEDURE — 99220 PR INITIAL OBSERVATION CARE/DAY 70 MINUTES: CPT | Performed by: INTERNAL MEDICINE

## 2022-07-18 PROCEDURE — 83880 ASSAY OF NATRIURETIC PEPTIDE: CPT

## 2022-07-18 PROCEDURE — 0241U HB NFCT DS VIR RESP RNA 4 TRGT: CPT | Performed by: INTERNAL MEDICINE

## 2022-07-18 PROCEDURE — 99285 EMERGENCY DEPT VISIT HI MDM: CPT

## 2022-07-18 RX ORDER — LOSARTAN POTASSIUM 50 MG/1
50 TABLET ORAL DAILY
Status: DISCONTINUED | OUTPATIENT
Start: 2022-07-19 | End: 2022-07-19 | Stop reason: HOSPADM

## 2022-07-18 RX ORDER — LANOLIN ALCOHOL/MO/W.PET/CERES
6 CREAM (GRAM) TOPICAL
Status: DISCONTINUED | OUTPATIENT
Start: 2022-07-18 | End: 2022-07-19 | Stop reason: HOSPADM

## 2022-07-18 RX ORDER — ATORVASTATIN CALCIUM 40 MG/1
40 TABLET, FILM COATED ORAL
Status: DISCONTINUED | OUTPATIENT
Start: 2022-07-19 | End: 2022-07-19 | Stop reason: HOSPADM

## 2022-07-18 RX ORDER — MAGNESIUM HYDROXIDE/ALUMINUM HYDROXICE/SIMETHICONE 120; 1200; 1200 MG/30ML; MG/30ML; MG/30ML
30 SUSPENSION ORAL EVERY 6 HOURS PRN
Status: DISCONTINUED | OUTPATIENT
Start: 2022-07-18 | End: 2022-07-19 | Stop reason: HOSPADM

## 2022-07-18 RX ORDER — SODIUM CHLORIDE 9 MG/ML
3 INJECTION INTRAVENOUS
Status: DISCONTINUED | OUTPATIENT
Start: 2022-07-18 | End: 2022-07-19 | Stop reason: HOSPADM

## 2022-07-18 RX ORDER — ACETAMINOPHEN 325 MG/1
650 TABLET ORAL EVERY 6 HOURS PRN
Status: DISCONTINUED | OUTPATIENT
Start: 2022-07-18 | End: 2022-07-19 | Stop reason: HOSPADM

## 2022-07-18 RX ORDER — ONDANSETRON 2 MG/ML
4 INJECTION INTRAMUSCULAR; INTRAVENOUS EVERY 6 HOURS PRN
Status: DISCONTINUED | OUTPATIENT
Start: 2022-07-18 | End: 2022-07-19 | Stop reason: HOSPADM

## 2022-07-18 RX ORDER — FAMOTIDINE 20 MG/1
20 TABLET, FILM COATED ORAL DAILY
Status: DISCONTINUED | OUTPATIENT
Start: 2022-07-18 | End: 2022-07-19 | Stop reason: HOSPADM

## 2022-07-18 RX ADMIN — MELATONIN TAB 3 MG 6 MG: 3 TAB at 23:58

## 2022-07-18 RX ADMIN — SODIUM CHLORIDE 1000 ML: 0.9 INJECTION, SOLUTION INTRAVENOUS at 12:58

## 2022-07-18 RX ADMIN — FAMOTIDINE 20 MG: 20 TABLET ORAL at 23:58

## 2022-07-18 RX ADMIN — RIVAROXABAN 20 MG: 20 TABLET, FILM COATED ORAL at 16:28

## 2022-07-18 NOTE — H&P
[address] [facility]  H&P- Promise Pierre 1950, 66 y o  male MRN: [id number]  Unit/Bed#: ED 17 Encounter: [id number]  Primary Care Provider: Randy Walls DO   Date and time admitted to hospital: 7/18/2022 12:01 PM    * New onset a-fib [facility])  Assessment & Plan  Patient seen by acute Care with complaints of chest pain and shortness of breath  Noted to have heart rate in the 140s; EKG showed AFib RVR  As per acute care note; this represents new onset AFib and patient transferred to [facility] ED for further evaluation  EKG on day of admission showed 1 episode of AFib RVR; currently rate controlled in normal sinus rhythm  Started on Xarelto in the ED; chads Vasc -3  Patient currently without chest pain or shortness of breath  Monitor on telemetry, consult to Cardiology    Acquired hypothyroidism  Assessment & Plan  Patient reports no longer taking levothyroxine  TSH on recent labs within normal limits  Defer to primary care    Essential hypertension  Assessment & Plan  Blood pressure currently well controlled; reports compliance with irbesartan  Continue present therapy    Mixed hyperlipidemia  Assessment & Plan  Patient reports he is no longer taking statin therapy  Will re-initiate statin therapy    Stage 3 chronic kidney disease [facility])  Assessment & Plan  Lab Results   Component Value Date    EGFR 50 07/18/2022    EGFR 52 07/14/2022    EGFR 45 01/05/2022    CREATININE 1 39 (H) 07/18/2022    CREATININE 1 35 (H) 07/14/2022    CREATININE 1 51 (H) 01/05/2022     Baseline creatinine appears to be approximately 1 5; patient at baseline  Monitor and avoid nephrotoxic medications and relative hypotension      VTE Prophylaxis: Rivaroxaban (Xarelto)  / sequential compression device   Code Status:  Full  POLST: POLST form is not discussed and not completed at this time    Discussion with family: Discussed treatment plan with family and patient who agree with current plan; encouraged to ask questions and participate  Anticipated Length of Stay:  Patient will be admitted on an Observation basis with an anticipated length of stay of  less than 2 midnights  Justification for Hospital Stay:  AFib RVR    Total Time for Visit, including Counseling / Coordination of Care: 60 minutes  Greater than 50% of this total time spent on direct patient counseling and coordination of care  Chief Complaint:   Chest pain with shortness of breath    History of Present Illness:    Michael Tobias is a 66 y o  male past medical history of hypertension, hyperlipidemia, CKD 3, BPH, renal cell carcinoma of right kidney (right nephrectomy -2006) who presented to urgent care for evaluation of new onset chest pain, palpitations, shortness of breath that occurred at rest on morning of presentation  Patient reports he was seated doing a crossword puzzle this morning when symptoms began suddenly  Wife transported to urgent care free was noticed to have AFib with RVR  Patient then transferred via EMS to [address] [place] [address] ED for further evaluation  Since arrival in the ED, heart rates have been well controlled and patient currently in normal sinus rhythm  Patient denies alcohol, tobacco (quit smoking in 2526), and illicit drug use  Denies any other acute symptoms such as headache, nausea/vomiting, fever/chills, diarrhea/constipation, abdominal pain  Will be admitted for evaluation of AFib RVR  Review of Systems:    Review of Systems   Constitutional: Positive for activity change  Negative for chills and fever  HENT: Negative for ear pain and sore throat  Eyes: Negative for pain and visual disturbance  Respiratory: Positive for shortness of breath  Negative for cough  Cardiovascular: Positive for chest pain and palpitations  Gastrointestinal: Negative for abdominal pain and vomiting  Genitourinary: Negative for dysuria and hematuria  Musculoskeletal: Negative for arthralgias and back pain     Skin: Negative for color change and rash  Neurological: Negative for seizures and syncope  All other systems reviewed and are negative  Past Medical and Surgical History:     Past Medical History:   Diagnosis Date    Arthritis     Cancer [facility])     right kidney     Cellulitis of left leg     June 2016    Chronic kidney disease     Exercises 3 to 4 times per week     Full dentures     Hypertension     Inguinal hernia, right     last assessed: 7/6/2016    Malignant neoplasm of kidney [facility] [address] )     unspecified laterality Managed by Poppy Krishnamurthy MD Last assessed: 3/1/2016    Renal cell carcinoma ([facility] [address] )     Seasonal allergies     Thrombosis     superficial left leg June 2016    Varicose veins of both lower extremities        Past Surgical History:   Procedure Laterality Date    APPENDECTOMY      COLONOSCOPY      CYSTOSCOPY      Diagnostic    HERNIA REPAIR Right 7/21/2016    Procedure: REPAIR HERNIA INGUINAL, LAPAROSCOPIC;  Surgeon: Jaime Dobbins MD;  Location: [facility];  Service:     HIP ARTHROPLASTY Left     twice 1999 and 2011    HIP ARTHROPLASTY Right     [address]  07/21/2016    description: laparoscopic right inguinal hernia repair with mesh     NEPHRECTOMY Right     due to a tumor    ROTATOR CUFF REPAIR Left     TOTAL HIP ARTHROPLASTY      TOTAL HIP ARTHROPLASTY Bilateral        Meds/Allergies:    Prior to Admission medications    Medication Sig Start Date End Date Taking?  Authorizing Provider   irbesartan (AVAPRO) 150 mg tablet Take 1 tablet (150 mg total) by mouth daily at bedtime 4/11/22  Yes Alex Maradiaga DO   clopidogrel (Plavix) 75 mg tablet Take 1 tablet (75 mg total) by mouth daily 10/27/21 7/18/22  Alex Maradiaga DO   levothyroxine 25 mcg tablet Take 1 tablet (25 mcg total) by mouth daily in the early morning 4/11/22 7/18/22  Alxe Maradiaga DO   rosuvastatin (CRESTOR) 5 mg tablet Take 1 tablet (5 mg total) by mouth daily 10/27/21 7/18/22  Alex Maradiaga DO     I have reviewed home medications with patient personally  Allergies: Allergies   Allergen Reactions    Nsaids Other (See Comments)     Instructed to avoid all ibuprofen's/NSAIDS  as pt only has one kidney       Social History:     Marital Status: /Civil Union   Occupation:   Patient Pre-hospital Living Situation:  Independently with wife  Patient Pre-hospital Level of Mobility:  Full  Patient Pre-hospital Diet Restrictions:  None  Substance Use History:   Social History     Substance and Sexual Activity   Alcohol Use Never     Social History     Tobacco Use   Smoking Status Former Smoker   Smokeless Tobacco Never Used     Social History     Substance and Sexual Activity   Drug Use No       Family History:    Family History   Problem Relation Age of Onset    Heart attack Mother     Cancer Father        Physical Exam:     Vitals:   Blood Pressure: 120/58 (07/18/22 1600)  Pulse: 58 (07/18/22 1600)  Temperature: 98 4 °F (36 9 °C) (07/18/22 1257)  Temp Source: Oral (07/18/22 1257)  Respirations: 14 (07/18/22 1600)  SpO2: 97 % (07/18/22 1600)    Physical Exam  Vitals and nursing note reviewed  Constitutional:       Appearance: He is well-developed  HENT:      Head: Normocephalic and atraumatic  Eyes:      Conjunctiva/sclera: Conjunctivae normal    Cardiovascular:      Rate and Rhythm: Normal rate and regular rhythm  Heart sounds: No murmur heard  Pulmonary:      Effort: Pulmonary effort is normal  No respiratory distress  Breath sounds: Normal breath sounds  Abdominal:      Palpations: Abdomen is soft  Tenderness: There is no abdominal tenderness  Musculoskeletal:      Cervical back: Neck supple  Skin:     General: Skin is warm and dry  Neurological:      Mental Status: He is alert  Additional Data:     Lab Results: I have personally reviewed pertinent reports        Results from last 7 days   Lab Units 07/18/22  1228   WBC Thousand/uL 9 38   HEMOGLOBIN g/dL 14 0   HEMATOCRIT % 41 6   PLATELETS Thousands/uL 285   NEUTROS PCT % 74   LYMPHS PCT % 13*   MONOS PCT % 8   EOS PCT % 3     Results from last 7 days   Lab Units 07/18/22  1228 07/14/22  0708   SODIUM mmol/L 143 140   POTASSIUM mmol/L 3 6 4 0   CHLORIDE mmol/L 105 109*   CO2 mmol/L 27 28   BUN mg/dL 22 20   CREATININE mg/dL 1 39* 1 35*   ANION GAP mmol/L 11 3*   CALCIUM mg/dL 9 6 9 6   ALBUMIN g/dL  --  3 7   TOTAL BILIRUBIN mg/dL  --  1 22*   ALK PHOS U/L  --  70   ALT U/L  --  24   AST U/L  --  15   GLUCOSE RANDOM mg/dL 123  --              Results from last 7 days   Lab Units 07/14/22  0708   HEMOGLOBIN A1C % 5 6           Imaging: I have personally reviewed pertinent reports  X-ray chest 1 view portable   ED Interpretation by Arabella Jackson MD (07/18 1504)   No acute cardiopulmonary disease      Final Result by Colton Shipley MD (07/18 1540)      No acute cardiopulmonary disease  Workstation performed: [id number]             EKG, Pathology, and Other Studies Reviewed on Admission:   · EKG:  Normal sinus rhythm    Allscripts / Epic Records Reviewed: Yes     ** Please Note: This note has been constructed using a voice recognition system   **

## 2022-07-18 NOTE — PROGRESS NOTES
[address]        NAME: Denisse Parisi is a 66 y o  male  : 1950    MRN: [id number]  DATE: 2022  TIME: 11:20 AM    Assessment and Plan   Atrial fibrillation, new onset ([facility] [address] ) [I48 91]  1  Atrial fibrillation, new onset [facility])           Patient Instructions       Go to ER      Chief Complaint     Chief Complaint   Patient presents with    Chest Pain     Patient relates he was at home sitting down doing a puzzle and started with chest pain to center of his chest  C/O dizziness and nausea  Denies shortness of breath  EKG on arrival in room 6 and Haris Segura PA-C notified  No chest pain at this time  History of Present Illness       34-year-old male presents with dizziness and chest discomfort shortness of breath  Patient reports today he was sitting down doing a crossword puzzle when all the son symptoms started  Denies any fevers or chills  No abdominal pain nausea vomiting or diarrhea  Chest Pain   This is a new problem  The current episode started today  The onset quality is sudden  The problem occurs constantly  The pain is present in the substernal region  The pain is moderate  The quality of the pain is described as pressure  The pain does not radiate  Associated symptoms include dizziness, irregular heartbeat and palpitations  Pertinent negatives include no abdominal pain, back pain, claudication, diaphoresis, sputum production, syncope or vomiting  The pain is aggravated by movement  He has tried rest for the symptoms  The treatment provided no relief  Risk factors include being elderly and male gender  His past medical history is significant for aortic aneurysm, DVT and hypertension  Review of Systems   Review of Systems   Constitutional: Negative  Negative for diaphoresis  HENT: Negative  Eyes: Negative  Respiratory: Negative  Negative for sputum production  Cardiovascular: Positive for chest pain and palpitations   Negative for claudication and syncope  Gastrointestinal: Negative  Negative for abdominal pain and vomiting  Musculoskeletal: Negative  Negative for back pain  Skin: Negative  Neurological: Positive for dizziness           Current Medications       Current Outpatient Medications:     clopidogrel (Plavix) 75 mg tablet, Take 1 tablet (75 mg total) by mouth daily, Disp: 30 tablet, Rfl: 5    irbesartan (AVAPRO) 150 mg tablet, Take 1 tablet (150 mg total) by mouth daily at bedtime, Disp: 90 tablet, Rfl: 1    levothyroxine 25 mcg tablet, Take 1 tablet (25 mcg total) by mouth daily in the early morning, Disp: 90 tablet, Rfl: 1    rosuvastatin (CRESTOR) 5 mg tablet, Take 1 tablet (5 mg total) by mouth daily, Disp: 30 tablet, Rfl: 5    Current Allergies     Allergies as of 07/18/2022 - Reviewed 07/18/2022   Allergen Reaction Noted    Nsaids Other (See Comments) 07/13/2016            The following portions of the patient's history were reviewed and updated as appropriate: allergies, current medications, past family history, past medical history, past social history, past surgical history and problem list      Past Medical History:   Diagnosis Date    Arthritis     [facility]     right kidney     Cellulitis of left leg     June 2016    Chronic kidney disease     Exercises 3 to 4 times per week     Full dentures     Hypertension     Inguinal hernia, right     last assessed: 7/6/2016    Malignant neoplasm of kidney ([facility] [address] )     unspecified laterality Managed by Erendira Bustos MD Last assessed: 3/1/2016    [facility]     Seasonal allergies     Thrombosis     superficial left leg June 2016    Varicose veins of both lower extremities        Past Surgical History:   Procedure Laterality Date    APPENDECTOMY      COLONOSCOPY      CYSTOSCOPY      Diagnostic    HERNIA REPAIR Right 7/21/2016    Procedure: REPAIR HERNIA INGUINAL, LAPAROSCOPIC;  Surgeon: Zainab Palacios MD;  Location: [facility];  Service:     HIP ARTHROPLASTY Left     twice 1999 and 2011    HIP ARTHROPLASTY Right     1999    LAPAROSCOPIC INGUINAL HERNIA REPAIR  07/21/2016    description: laparoscopic right inguinal hernia repair with mesh     NEPHRECTOMY Right     due to a tumor    ROTATOR CUFF REPAIR Left     TOTAL HIP ARTHROPLASTY      TOTAL HIP ARTHROPLASTY Bilateral        Family History   Problem Relation Age of Onset    Heart attack Mother     Cancer Father          Medications have been verified  Objective   /97   Pulse (!) 144   Temp (!) 96 5 °F (35 8 °C) (Tympanic)   Resp 18   Wt 107 kg (235 lb)   SpO2 98%   BMI 27 87 kg/m²   No LMP for male patient  Physical Exam     Physical Exam  Vitals and nursing note reviewed  Constitutional:       General: He is not in acute distress  Appearance: Normal appearance  He is well-developed  HENT:      Head: Normocephalic and atraumatic  Right Ear: Hearing, tympanic membrane, ear canal and external ear normal  There is no impacted cerumen  Left Ear: Hearing, tympanic membrane, ear canal and external ear normal  There is no impacted cerumen  Nose: Nose normal       Mouth/Throat:      Pharynx: Uvula midline  No oropharyngeal exudate  Eyes:      General:         Right eye: No discharge  Left eye: No discharge  Conjunctiva/sclera: Conjunctivae normal    Cardiovascular:      Rate and Rhythm: Tachycardia present  Rhythm irregularly irregular  Pulses: Normal pulses  Heart sounds: Normal heart sounds  No murmur heard  Pulmonary:      Effort: Pulmonary effort is normal  No respiratory distress  Breath sounds: Normal breath sounds  No decreased breath sounds, wheezing, rhonchi or rales  Abdominal:      General: Bowel sounds are normal       Palpations: Abdomen is soft  Tenderness: There is no abdominal tenderness  Musculoskeletal:         General: Normal range of motion  Cervical back: Normal range of motion and neck supple  Lymphadenopathy:      Cervical: No cervical adenopathy  Skin:     General: Skin is warm and dry  Neurological:      Mental Status: He is alert and oriented to person, place, and time  Psychiatric:         Mood and Affect: Mood normal            EKG reviewed  Patient is in AFib at 146 beats per minute    Patient is being sent by EMS to ER for evaluation new onset AFib

## 2022-07-18 NOTE — ASSESSMENT & PLAN NOTE
Lab Results   Component Value Date    EGFR 50 07/18/2022    EGFR 52 07/14/2022    EGFR 45 01/05/2022    CREATININE 1 39 (H) 07/18/2022    CREATININE 1 35 (H) 07/14/2022    CREATININE 1 51 (H) 01/05/2022     Baseline creatinine appears to be approximately 1 5; patient at baseline  Monitor and avoid nephrotoxic medications and relative hypotension

## 2022-07-18 NOTE — ASSESSMENT & PLAN NOTE
Patient seen by acute Care with complaints of chest pain and shortness of breath  Noted to have heart rate in the 140s; EKG showed AFib RVR  As per acute care note; this represents new onset AFib and patient transferred to [facility] ED for further evaluation  EKG on day of admission showed 1 episode of AFib RVR; currently rate controlled in normal sinus rhythm  Started on Xarelto in the ED; chads Vasc -3  Patient currently without chest pain or shortness of breath  Monitor on telemetry, consult to Cardiology

## 2022-07-18 NOTE — ASSESSMENT & PLAN NOTE
Patient reports no longer taking levothyroxine  TSH on recent labs within normal limits  Defer to primary care

## 2022-07-18 NOTE — ED ATTENDING ATTESTATION
7/18/2022  I, Louisa Cat DO, saw and evaluated the patient  I have discussed the patient with the resident/non-physician practitioner and agree with the resident's/non-physician practitioner's findings, Plan of Care, and MDM as documented in the resident's/non-physician practitioner's note, except where noted  All available labs and Radiology studies were reviewed  I was present for key portions of any procedure(s) performed by the resident/non-physician practitioner and I was immediately available to provide assistance  At this point I agree with the current assessment done in the Emergency Department  I have conducted an independent evaluation of this patient a history and physical is as follows:    67 yo M h/o HTN, AAA; presenting for evaluation of new Afib  Pt states that around 10:30 am he was doing a crossword puzzle, felt chest discomfort/indigestion/burning, dizzy, sensation of heart beating fast and irregular  Went to Urgent Care, found to be in Afib, no history of similar  Currently in NSR and no complaints laying in bed    On my evaluation, pt requested to get up/out of bed to use bathroom and became lightheaded/dizzy upon standing, sat back down and used urinal in bed   Repeat EKG still NSR    MDM: 67 yo M with episode of Afib- cardiac monitor, cardiac workup, ddimer, TSH WNL 4 days ago, IVF, reassess      ED Course         Critical Care Time  Procedures

## 2022-07-18 NOTE — ED PROVIDER NOTES
History  Chief Complaint   Patient presents with    Atrial Fibrillation     Sent from urgent care for new onset on afib  Pt reports CP starting this morning with dizziness  Denies SOB  Patient is a 67 y/o M with PMH CKD, HTN presenting with new onset atrial fibrillation  Patient states around 10am this morning he had sudden onset chest discomfort described as a burning type sensation  Associated with dizziness without pre-syncope/syncope or falls  States he went to urgent care and was told he was in atrial fibrillation  Pt denies history of a fib, known history of DVT/PE, recent illness, known thyroid dysfunction, excessive alcohol use  Pt does not take AC, no prior stroke or VTE  Prior to Admission Medications   Prescriptions Last Dose Informant Patient Reported?  Taking?   irbesartan (AVAPRO) 150 mg tablet 7/17/2022 at Unknown time  No Yes   Sig: Take 1 tablet (150 mg total) by mouth daily at bedtime      Facility-Administered Medications: None       Past Medical History:   Diagnosis Date    Arthritis     Cancer ([facility] [address] )     right kidney     Cellulitis of left leg     June 2016    Chronic kidney disease     Exercises 3 to 4 times per week     Full dentures     Hypertension     Inguinal hernia, right     last assessed: 7/6/2016    Malignant neoplasm of kidney ([facility] [address] )     unspecified laterality Managed by Conrado Eubanks MD Last assessed: 3/1/2016    [facility]     Seasonal allergies     Thrombosis     superficial left leg June 2016    Varicose veins of both lower extremities        Past Surgical History:   Procedure Laterality Date    APPENDECTOMY      COLONOSCOPY      CYSTOSCOPY      Diagnostic    HERNIA REPAIR Right 7/21/2016    Procedure: REPAIR HERNIA INGUINAL, LAPAROSCOPIC;  Surgeon: Alirio Cardenas MD;  Location: [facility];  Service:     HIP ARTHROPLASTY Left     twice 1999 and 2011    HIP ARTHROPLASTY Right     1999    [facility] 07/21/2016    description: laparoscopic right inguinal hernia repair with mesh     NEPHRECTOMY Right     due to a tumor    ROTATOR CUFF REPAIR Left     TOTAL HIP ARTHROPLASTY      TOTAL HIP ARTHROPLASTY Bilateral        Family History   Problem Relation Age of Onset    Heart attack Mother     Cancer Father      I have reviewed and agree with the history as documented  E-Cigarette/Vaping     E-Cigarette/Vaping Substances     Social History     Tobacco Use    Smoking status: Former Smoker    Smokeless tobacco: Never Used   Substance Use Topics    Alcohol use: Never    Drug use: No        Review of Systems   Constitutional: Negative for chills and fever  HENT: Negative for ear pain and sore throat  Eyes: Negative for pain and visual disturbance  Respiratory: Negative for cough and shortness of breath  Cardiovascular: Positive for chest pain  Negative for palpitations  Gastrointestinal: Negative for abdominal pain and vomiting  Genitourinary: Negative for dysuria and hematuria  Musculoskeletal: Negative for arthralgias and back pain  Skin: Negative for color change and rash  Neurological: Positive for dizziness  Negative for seizures and syncope  All other systems reviewed and are negative  Physical Exam  ED Triage Vitals   Temperature Pulse Respirations Blood Pressure SpO2   07/18/22 1257 07/18/22 1205 07/18/22 1205 07/18/22 1205 07/18/22 1205   98 4 °F (36 9 °C) 78 16 116/82 98 %      Temp Source Heart Rate Source Patient Position - Orthostatic VS BP Location FiO2 (%)   07/18/22 1205 07/18/22 1205 07/18/22 1205 07/18/22 1205 --   Oral Monitor Sitting Left arm       Pain Score       --                    Orthostatic Vital Signs  Vitals:    07/18/22 1300 07/18/22 1330 07/18/22 1430 07/18/22 1600   BP: 112/68 124/69 126/61 120/58   Pulse: 68 58 (!) 54 58   Patient Position - Orthostatic VS: Sitting  Sitting Sitting       Physical Exam  Vitals and nursing note reviewed  Constitutional:       General: He is not in acute distress  Appearance: He is well-developed  He is not toxic-appearing  HENT:      Head: Normocephalic and atraumatic  Right Ear: External ear normal       Left Ear: External ear normal       Nose: Nose normal       Mouth/Throat:      Pharynx: Oropharynx is clear  No oropharyngeal exudate or posterior oropharyngeal erythema  Eyes:      Extraocular Movements: Extraocular movements intact  Conjunctiva/sclera: Conjunctivae normal       Pupils: Pupils are equal, round, and reactive to light  Cardiovascular:      Rate and Rhythm: Normal rate and regular rhythm  Pulses: Normal pulses  Heart sounds: Normal heart sounds  No murmur heard  No friction rub  No gallop  Pulmonary:      Effort: Pulmonary effort is normal  No respiratory distress  Breath sounds: Normal breath sounds  No wheezing, rhonchi or rales  Abdominal:      General: Abdomen is flat  There is no distension  Palpations: Abdomen is soft  Tenderness: There is no abdominal tenderness  There is no guarding or rebound  Musculoskeletal:         General: No tenderness  Normal range of motion  Cervical back: Normal range of motion  No rigidity  Right lower leg: No edema  Left lower leg: No edema  Skin:     General: Skin is warm and dry  Capillary Refill: Capillary refill takes less than 2 seconds  Neurological:      General: No focal deficit present  Mental Status: He is alert     Psychiatric:         Mood and Affect: Mood normal          ED Medications  Medications   sodium chloride (PF) 0 9 % injection 3 mL (has no administration in time range)   rivaroxaban (XARELTO) tablet 20 mg (20 mg Oral Given 7/18/22 1628)   sodium chloride 0 9 % bolus 1,000 mL (0 mL Intravenous Stopped 7/18/22 1408)       Diagnostic Studies  Results Reviewed     Procedure Component Value Units Date/Time    COVID/FLU/RSV [[id number]] Collected: 07/18/22 7498 Lab Status: In process Specimen: Nares from Nose Updated: 07/18/22 1653    HS Troponin I 2hr [[id number]]  (Normal) Collected: 07/18/22 1441    Lab Status: Final result Specimen: Blood from Arm, Left Updated: 07/18/22 1512     hs TnI 2hr 28 ng/L      Delta 2hr hsTnI 17 ng/L     HS Troponin I 4hr [[id number]]     Lab Status: No result Specimen: Blood     Basic metabolic panel [[id number]]  (Abnormal) Collected: 07/18/22 1228    Lab Status: Final result Specimen: Blood from Arm, Left Updated: 07/18/22 1305     Sodium 143 mmol/L      Potassium 3 6 mmol/L      Chloride 105 mmol/L      CO2 27 mmol/L      ANION GAP 11 mmol/L      BUN 22 mg/dL      Creatinine 1 39 mg/dL      Glucose 123 mg/dL      Calcium 9 6 mg/dL      eGFR 50 ml/min/1 73sq m     Narrative:      [facility] guidelines for Chronic Kidney Disease (CKD):     Stage 1 with normal or high GFR (GFR > 90 mL/min/1 73 square meters)    Stage 2 Mild CKD (GFR = 60-89 mL/min/1 73 square meters)    Stage 3A Moderate CKD (GFR = 45-59 mL/min/1 73 square meters)    Stage 3B Moderate CKD (GFR = 30-44 mL/min/1 73 square meters)    Stage 4 Severe CKD (GFR = 15-29 mL/min/1 73 square meters)    Stage 5 End Stage CKD (GFR <15 mL/min/1 73 square meters)  Note: GFR calculation is accurate only with a steady state creatinine    NT-BNP PRO [[id number]]  (Abnormal) Collected: 07/18/22 1228    Lab Status: Final result Specimen: Blood from Arm, Left Updated: 07/18/22 1305     NT-proBNP 249 pg/mL     D-dimer, quantitative [[id number]]  (Abnormal) Collected: 07/18/22 1228    Lab Status: Final result Specimen: Blood from Arm, Left Updated: 07/18/22 1300     D-Dimer, Quant 0 58 ug/ml FEU     Narrative:       In the evaluation for possible pulmonary embolism, in the appropriate (Well's Score of 4 or less) patient, the age adjusted d-dimer cutoff for this patient can be calculated as:    Age x 0 01 (in ug/mL) for Age-adjusted D-dimer exclusion threshold for a patient over 50 years  HS Troponin 0hr (reflex protocol) [[id number]]  (Normal) Collected: 07/18/22 1228    Lab Status: Final result Specimen: Blood from Arm, Left Updated: 07/18/22 1258     hs TnI 0hr 11 ng/L     CBC and differential [[id number]]  (Abnormal) Collected: 07/18/22 1228    Lab Status: Final result Specimen: Blood from Arm, Left Updated: 07/18/22 1233     WBC 9 38 Thousand/uL      RBC 4 65 Million/uL      Hemoglobin 14 0 g/dL      Hematocrit 41 6 %      MCV 90 fL      MCH 30 1 pg      MCHC 33 7 g/dL      RDW 11 9 %      MPV 9 3 fL      Platelets 025 Thousands/uL      nRBC 0 /100 WBCs      Neutrophils Relative 74 %      Immat GRANS % 1 %      Lymphocytes Relative 13 %      Monocytes Relative 8 %      Eosinophils Relative 3 %      Basophils Relative 1 %      Neutrophils Absolute 7 05 Thousands/µL      Immature Grans Absolute 0 05 Thousand/uL      Lymphocytes Absolute 1 21 Thousands/µL      Monocytes Absolute 0 76 Thousand/µL      Eosinophils Absolute 0 24 Thousand/µL      Basophils Absolute 0 07 Thousands/µL                  X-ray chest 1 view portable   ED Interpretation by Arabella Jackson MD (07/18 1505)   No acute cardiopulmonary disease      Final Result by Colton Shipley MD (07/18 1540)      No acute cardiopulmonary disease  Workstation performed: [id number]               Procedures  Procedures      ED Course  ED Course as of 07/18/22 1653   Mon Jul 18, 2022   1220 EKG at 1005 from urgent care demonstrates a fib with RVR, evidenced again on EMS EKG at 1140 with rate 143   EKG at 1205 demonstrates NSR with rate 79 without ST or T wave changes   1247 Re-evaluated pt as was informed he felt dizzy when he stood up, pt states this always happens, no syncope   1337 D-Dimer, Quant(!): 0 58  Age adjusts to negative   1338 Creatinine(!): 1 39  At baseline   1504 Repeat EKG now sinus bradycardia to 54, no other significant changes, no STEMI   1534 hs TnI 2hr: 28  D/w pt, agrees fro admission [id number] SCORE    Flowsheet Row Most Recent Value   [id number]    Age 1 Filed at: 07/18/2022 1653   Sex 0 Filed at: 07/18/2022 1653   CHF History 0 Filed at: 07/18/2022 1653   HTN History 1 Filed at: 07/18/2022 1653   Stroke or TIA Symptoms Previously 0 Filed at: 07/18/2022 1653   Vascular Disease History 0 Filed at: 07/18/2022 1653   Diabetes History 0 Filed at: 07/18/2022 1653   [id number] Score 2 Filed at: 07/18/2022 1653            HEART Risk Score    Flowsheet Row Most Recent Value   Heart Score Risk Calculator    History 0 Filed at: 07/18/2022 1653   ECG 0 Filed at: 07/18/2022 1653   Age 2 Filed at: 07/18/2022 1653   Risk Factors 1 Filed at: 07/18/2022 1653   Troponin 0 Filed at: 07/18/2022 1653   HEART Score 3 Filed at: 07/18/2022 1653                      SBIRT 22yo+    Flowsheet Row Most Recent Value   SBIRT (25 yo +)    In order to provide better care to our patients, we are screening all of our patients for alcohol and drug use  Would it be okay to ask you these screening questions? No Filed at: 07/18/2022 1408                MDM  Number of Diagnoses or Management Options  Elevated troponin  New onset atrial fibrillation [facility])  Diagnosis management comments: 65 y/o M presenting with new onset a fib, on initial evaluation self converted back to NSR  No signs of infection, recent TSH normal, prior hx of treated renal cancer, no current DVT/PE risk factors  Cardiac w/u including d dimer  D dimer age adjusts to negative  Repeat EKG's consistently in NSR without ischemic changes  Pt with episode of dizziness upon standing  Fluids started, reassessed with improvement  Delta troponin of 17, discussed with pt recommendation of admission based on elevated troponin  Pt agreeable for obs with tele, SLIM to admit         Disposition  Final diagnoses:   New onset atrial fibrillation (HCC)   Elevated troponin     Time reflects when diagnosis was documented in both MDM as applicable and the Disposition within this note     Time User Action Codes Description Comment    7/18/2022  3:42 PM Silvina Topete Add [I48 91] New onset atrial fibrillation ([address] )     7/18/2022  3:42 PM Silvina Topete Add [R77 8] Elevated troponin       ED Disposition     ED Disposition   Admit    Condition   Stable    Date/Time   Mon Jul 18, 2022  3:42 PM    Comment   Case was discussed with Dr Francisco Macedo and the patient's admission status was agreed to be Admission Status: observation status to the service of Dr Francisco Macedo   Follow-up Information    None         Patient's Medications   Discharge Prescriptions    No medications on file     No discharge procedures on file  PDMP Review     None           ED Provider  Attending physically available and evaluated Jose Underwood  JANE managed the patient along with the ED Attending      Electronically Signed by         Frank Gilmore MD  07/18/22 1145       Frank Gilmore MD  07/18/22 7083

## 2022-07-18 NOTE — ASSESSMENT & PLAN NOTE
Blood pressure currently well controlled; reports compliance with irbesartan  Continue present therapy

## 2022-07-19 ENCOUNTER — APPOINTMENT (INPATIENT)
Dept: NON INVASIVE DIAGNOSTICS | Facility: HOSPITAL | Age: 72
End: 2022-07-19
Payer: COMMERCIAL

## 2022-07-19 VITALS
WEIGHT: 235 LBS | TEMPERATURE: 97.8 F | BODY MASS INDEX: 27.75 KG/M2 | DIASTOLIC BLOOD PRESSURE: 69 MMHG | SYSTOLIC BLOOD PRESSURE: 132 MMHG | HEIGHT: 77 IN | OXYGEN SATURATION: 96 % | RESPIRATION RATE: 16 BRPM | HEART RATE: 51 BPM

## 2022-07-19 LAB
ALBUMIN SERPL BCP-MCNC: 3.2 G/DL (ref 3.5–5)
ALP SERPL-CCNC: 69 U/L (ref 46–116)
ALT SERPL W P-5'-P-CCNC: 21 U/L (ref 12–78)
ANION GAP SERPL CALCULATED.3IONS-SCNC: 9 MMOL/L (ref 4–13)
AORTIC ROOT: 3.5 CM
AORTIC VALVE MEAN VELOCITY: 11.3 M/S
AST SERPL W P-5'-P-CCNC: 12 U/L (ref 5–45)
ATRIAL RATE: 54 BPM
AV LVOT MEAN GRADIENT: 3 MMHG
AV LVOT PEAK GRADIENT: 5 MMHG
AV MEAN GRADIENT: 6 MMHG
AV PEAK GRADIENT: 10 MMHG
BASOPHILS # BLD AUTO: 0.07 THOUSANDS/ΜL (ref 0–0.1)
BASOPHILS NFR BLD AUTO: 1 % (ref 0–1)
BILIRUB SERPL-MCNC: 0.95 MG/DL (ref 0.2–1)
BUN SERPL-MCNC: 21 MG/DL (ref 5–25)
CALCIUM ALBUM COR SERPL-MCNC: 9.6 MG/DL (ref 8.3–10.1)
CALCIUM SERPL-MCNC: 9 MG/DL (ref 8.3–10.1)
CARDIAC TROPONIN I PNL SERPL HS: 14 NG/L (ref 8–18)
CHLORIDE SERPL-SCNC: 107 MMOL/L (ref 96–108)
CO2 SERPL-SCNC: 27 MMOL/L (ref 21–32)
CREAT SERPL-MCNC: 1.3 MG/DL (ref 0.6–1.3)
DOP CALC AO PEAK VEL: 1.55 M/S
DOP CALC AO VTI: 34.55 CM
DOP CALC LVOT PEAK VEL VTI: 26.69 CM
DOP CALC LVOT PEAK VEL: 1.14 M/S
E WAVE DECELERATION TIME: 261 MS
E/A RATIO: 1.29
EOSINOPHIL # BLD AUTO: 0.35 THOUSAND/ΜL (ref 0–0.61)
EOSINOPHIL NFR BLD AUTO: 3 % (ref 0–6)
ERYTHROCYTE [DISTWIDTH] IN BLOOD BY AUTOMATED COUNT: 12 % (ref 11.6–15.1)
FRACTIONAL SHORTENING: 47 (ref 28–44)
GFR SERPL CREATININE-BSD FRML MDRD: 54 ML/MIN/1.73SQ M
GLUCOSE P FAST SERPL-MCNC: 113 MG/DL (ref 65–99)
GLUCOSE SERPL-MCNC: 113 MG/DL (ref 65–140)
HCT VFR BLD AUTO: 38.3 % (ref 36.5–49.3)
HGB BLD-MCNC: 12.9 G/DL (ref 12–17)
IMM GRANULOCYTES # BLD AUTO: 0.06 THOUSAND/UL (ref 0–0.2)
IMM GRANULOCYTES NFR BLD AUTO: 1 % (ref 0–2)
INTERVENTRICULAR SEPTUM IN DIASTOLE (PARASTERNAL SHORT AXIS VIEW): 1.6 CM
INTERVENTRICULAR SEPTUM: 1.6 CM (ref 0.6–1.1)
LAAS-AP2: 26.9 CM2
LAAS-AP4: 23.7 CM2
LEFT ATRIUM SIZE: 4.7 CM
LEFT ATRIUM VOLUME INDEX (MOD BIPLANE): 19.6
LEFT INTERNAL DIMENSION IN SYSTOLE: 2.6 CM (ref 2.1–4)
LEFT VENTRICULAR INTERNAL DIMENSION IN DIASTOLE: 4.9 CM (ref 3.5–6)
LEFT VENTRICULAR POSTERIOR WALL IN END DIASTOLE: 1.5 CM
LEFT VENTRICULAR STROKE VOLUME: 87 ML
LVSV (TEICH): 87 ML
LYMPHOCYTES # BLD AUTO: 1.41 THOUSANDS/ΜL (ref 0.6–4.47)
LYMPHOCYTES NFR BLD AUTO: 12 % (ref 14–44)
MAGNESIUM SERPL-MCNC: 1.4 MG/DL (ref 1.6–2.6)
MCH RBC QN AUTO: 30.5 PG (ref 26.8–34.3)
MCHC RBC AUTO-ENTMCNC: 33.7 G/DL (ref 31.4–37.4)
MCV RBC AUTO: 91 FL (ref 82–98)
MONOCYTES # BLD AUTO: 0.9 THOUSAND/ΜL (ref 0.17–1.22)
MONOCYTES NFR BLD AUTO: 8 % (ref 4–12)
MV E'TISSUE VEL-LAT: 9 CM/S
MV E'TISSUE VEL-SEP: 9 CM/S
MV PEAK A VEL: 0.59 M/S
MV PEAK E VEL: 76 CM/S
MV STENOSIS PRESSURE HALF TIME: 76 MS
MV VALVE AREA P 1/2 METHOD: 2.9
NEUTROPHILS # BLD AUTO: 8.54 THOUSANDS/ΜL (ref 1.85–7.62)
NEUTS SEG NFR BLD AUTO: 75 % (ref 43–75)
NRBC BLD AUTO-RTO: 0 /100 WBCS
P AXIS: 84 DEGREES
PHOSPHATE SERPL-MCNC: 2.9 MG/DL (ref 2.3–4.1)
PLATELET # BLD AUTO: 246 THOUSANDS/UL (ref 149–390)
PMV BLD AUTO: 9 FL (ref 8.9–12.7)
POTASSIUM SERPL-SCNC: 3.9 MMOL/L (ref 3.5–5.3)
PR INTERVAL: 200 MS
PROT SERPL-MCNC: 6.1 G/DL (ref 6.4–8.4)
QRS AXIS: 20 DEGREES
QRSD INTERVAL: 82 MS
QT INTERVAL: 450 MS
QTC INTERVAL: 426 MS
RBC # BLD AUTO: 4.23 MILLION/UL (ref 3.88–5.62)
RIGHT ATRIAL 2D VOLUME: 74 ML
RIGHT ATRIUM AREA SYSTOLE A4C: 23.5 CM2
RIGHT VENTRICLE ID DIMENSION: 3.7 CM
SL CV LEFT ATRIUM LENGTH A2C: 6.3 CM
SL CV LV EF: 60
SL CV PED ECHO LEFT VENTRICLE DIASTOLIC VOLUME (MOD BIPLANE) 2D: 112 ML
SL CV PED ECHO LEFT VENTRICLE SYSTOLIC VOLUME (MOD BIPLANE) 2D: 26 ML
SODIUM SERPL-SCNC: 143 MMOL/L (ref 135–147)
T WAVE AXIS: 41 DEGREES
VENTRICULAR RATE: 54 BPM
WBC # BLD AUTO: 11.33 THOUSAND/UL (ref 4.31–10.16)

## 2022-07-19 PROCEDURE — 85025 COMPLETE CBC W/AUTO DIFF WBC: CPT | Performed by: INTERNAL MEDICINE

## 2022-07-19 PROCEDURE — 93010 ELECTROCARDIOGRAM REPORT: CPT | Performed by: INTERNAL MEDICINE

## 2022-07-19 PROCEDURE — 84100 ASSAY OF PHOSPHORUS: CPT | Performed by: INTERNAL MEDICINE

## 2022-07-19 PROCEDURE — 93306 TTE W/DOPPLER COMPLETE: CPT | Performed by: INTERNAL MEDICINE

## 2022-07-19 PROCEDURE — 93306 TTE W/DOPPLER COMPLETE: CPT

## 2022-07-19 PROCEDURE — 83735 ASSAY OF MAGNESIUM: CPT | Performed by: INTERNAL MEDICINE

## 2022-07-19 PROCEDURE — 99205 OFFICE O/P NEW HI 60 MIN: CPT | Performed by: PHYSICIAN ASSISTANT

## 2022-07-19 PROCEDURE — 99239 HOSP IP/OBS DSCHRG MGMT >30: CPT | Performed by: PHYSICIAN ASSISTANT

## 2022-07-19 PROCEDURE — 84484 ASSAY OF TROPONIN QUANT: CPT | Performed by: PHYSICIAN ASSISTANT

## 2022-07-19 PROCEDURE — 80053 COMPREHEN METABOLIC PANEL: CPT | Performed by: INTERNAL MEDICINE

## 2022-07-19 PROCEDURE — NC001 PR NO CHARGE: Performed by: PHYSICIAN ASSISTANT

## 2022-07-19 RX ORDER — ATORVASTATIN CALCIUM 40 MG/1
40 TABLET, FILM COATED ORAL
Qty: 30 TABLET | Refills: 0 | Status: SHIPPED | OUTPATIENT
Start: 2022-07-19 | End: 2022-07-20 | Stop reason: SDUPTHER

## 2022-07-19 RX ORDER — MAGNESIUM SULFATE 1 G/100ML
1 INJECTION INTRAVENOUS ONCE
Status: COMPLETED | OUTPATIENT
Start: 2022-07-19 | End: 2022-07-19

## 2022-07-19 RX ADMIN — MAGNESIUM SULFATE HEPTAHYDRATE 1 G: 1 INJECTION, SOLUTION INTRAVENOUS at 10:36

## 2022-07-19 RX ADMIN — ATORVASTATIN CALCIUM 40 MG: 40 TABLET, FILM COATED ORAL at 16:06

## 2022-07-19 RX ADMIN — LOSARTAN POTASSIUM 50 MG: 50 TABLET, FILM COATED ORAL at 08:04

## 2022-07-19 RX ADMIN — FAMOTIDINE 20 MG: 20 TABLET ORAL at 08:04

## 2022-07-19 RX ADMIN — RIVAROXABAN 20 MG: 20 TABLET, FILM COATED ORAL at 16:06

## 2022-07-19 NOTE — PROGRESS NOTES
[facility]  Progress Note - [facility] 1950, 66 y o  male MRN: [id number]  Unit/Bed#: [address] [id number] Encounter: [id number]  Primary Care Provider: Soni Camarena DO   Date and time admitted to hospital: 7/18/2022 12:01 PM    * New onset a-fib [facility])  Assessment & Plan  Patient with complaints of chest pain and shortness of breath  · Noted to have heart rate in the 140s; EKG showed AFib RVR  · EKG on day of admission showed 1 episode of AFib RVR; currently rate controlled in normal sinus rhythm without BB therapy  · Started on Xarelto in the ED; chads Vasc -3  · Trop 11 - 28-43  Trend once now  Echo ordered  · Monitor on telemetry, consult to Cardiology    Acquired hypothyroidism  Assessment & Plan  · Patient reports no longer taking levothyroxine  · TSH on recent labs within normal limits  · Defer to primary care    Essential hypertension  Assessment & Plan  · Blood pressure currently well controlled; reports compliance with irbesartan  · Continue present therapy    Mixed hyperlipidemia  Assessment & Plan  · Patient reports he is no longer taking statin therapy  · Will re-initiate statin therapy    Stage 3 chronic kidney disease [facility])  Assessment & Plan  Lab Results   Component Value Date    EGFR 54 07/19/2022    EGFR 50 07/18/2022    EGFR 52 07/14/2022    CREATININE 1 30 07/19/2022    CREATININE 1 39 (H) 07/18/2022    CREATININE 1 35 (H) 07/14/2022     · Baseline creatinine appears to be approximately 1 5; patient at baseline  · Monitor and avoid nephrotoxic medications and relative hypotension        VTE Pharmacologic Prophylaxis: VTE Score: 3 Moderate Risk (Score 3-4) - Pharmacological DVT Prophylaxis Ordered: rivaroxaban (Xarelto)  Patient Centered Rounds: I performed bedside rounds with nursing staff today    Discussions with Specialists or Other Care Team Provider: cm    Education and Discussions with Family / Patient: Updated  (wife) via phone     Time Spent for Care: 30 minutes  More than 50% of total time spent on counseling and coordination of care as described above  Current Length of Stay: 0 day(s)  Current Patient Status: Inpatient   Certification Statement: The patient will continue to require additional inpatient hospital stay due to echo pending cards clearance  Discharge Plan: Anticipate discharge later today or tomorrow to home  Code Status: Level 1 - Full Code    Subjective:   No complaints wants to go home    Objective:     Vitals:   Temp (24hrs), Av 7 °F (36 5 °C), Min:96 5 °F (35 8 °C), Max:98 4 °F (36 9 °C)    Temp:  [96 5 °F (35 8 °C)-98 4 °F (36 9 °C)] 98 °F (36 7 °C)  HR:  [] 51  Resp:  [14-20] 16  BP: (101-153)/(58-97) 131/67  SpO2:  [95 %-98 %] 97 %  There is no height or weight on file to calculate BMI  Input and Output Summary (last 24 hours): Intake/Output Summary (Last 24 hours) at 2022 0920  Last data filed at 2022 1408  Gross per 24 hour   Intake 1000 ml   Output --   Net 1000 ml       Physical Exam:   Physical Exam  Vitals and nursing note reviewed  HENT:      Head: Normocephalic  Nose: Nose normal       Mouth/Throat:      Mouth: Mucous membranes are moist    Eyes:      Conjunctiva/sclera: Conjunctivae normal    Cardiovascular:      Rate and Rhythm: Normal rate and regular rhythm  Pulses: Normal pulses  Heart sounds: Normal heart sounds  Pulmonary:      Effort: Pulmonary effort is normal       Breath sounds: Normal breath sounds  Abdominal:      General: Bowel sounds are normal       Palpations: Abdomen is soft  Musculoskeletal:         General: No swelling  Normal range of motion  Cervical back: Normal range of motion  Skin:     General: Skin is warm and dry  Capillary Refill: Capillary refill takes less than 2 seconds  Neurological:      General: No focal deficit present  Mental Status: He is alert     Psychiatric:         Mood and Affect: Mood normal          Behavior: Behavior normal           Additional Data:     Labs:  Results from last 7 days   Lab Units 07/19/22  0616   WBC Thousand/uL 11 33*   HEMOGLOBIN g/dL 12 9   HEMATOCRIT % 38 3   PLATELETS Thousands/uL 246   NEUTROS PCT % 75   LYMPHS PCT % 12*   MONOS PCT % 8   EOS PCT % 3     Results from last 7 days   Lab Units 07/19/22  0616   SODIUM mmol/L 143   POTASSIUM mmol/L 3 9   CHLORIDE mmol/L 107   CO2 mmol/L 27   BUN mg/dL 21   CREATININE mg/dL 1 30   ANION GAP mmol/L 9   CALCIUM mg/dL 9 0   ALBUMIN g/dL 3 2*   TOTAL BILIRUBIN mg/dL 0 95   ALK PHOS U/L 69   ALT U/L 21   AST U/L 12   GLUCOSE RANDOM mg/dL 113             Results from last 7 days   Lab Units 07/14/22  0708   HEMOGLOBIN A1C % 5 6           Lines/Drains:  Invasive Devices  Report    Peripheral Intravenous Line  Duration           Peripheral IV 07/18/22 Left Antecubital <1 day                  Telemetry:  Telemetry Orders (From admission, onward)             48 Hour Telemetry Monitoring  Continuous x 48 hours        References:    Telemetry Guidelines   Question:  Reason for 48 Hour Telemetry  Answer:  Arrhythmias Requiring Medical Therapy (eg  SVT, Vtach/fib, Bradycardia, Uncontrolled A-fib)                 Telemetry Reviewed: Normal Sinus Rhythm  Indication for Continued Telemetry Use: No indication for continued use  Will discontinue  Imaging: No pertinent imaging reviewed      Recent Cultures (last 7 days):         Last 24 Hours Medication List:   Current Facility-Administered Medications   Medication Dose Route Frequency Provider Last Rate    acetaminophen  650 mg Oral Q6H PRN Bucky Brandon MD      aluminum-magnesium hydroxide-simethicone  30 mL Oral Q6H PRN Bucky Brandon MD      atorvastatin  40 mg Oral Daily With Rianna Mims MD      famotidine  20 mg Oral Daily Bucky Brandon MD      losartan  50 mg Oral Daily Bucky Barndon MD      magnesium sulfate  1 g Intravenous Once Cynthia Herbert PA-C      melatonin 6 mg Oral HS Lidia Andrade PA-C      ondansetron  4 mg Intravenous Q6H PRN Molly Ledezma MD      rivaroxaban  20 mg Oral Daily With Andres Parkinson MD      sodium chloride (PF)  3 mL Intravenous Q1H PRN Molly Ledezma MD          Today, Patient Was Seen By: Star Brittle, PA-C    **Please Note: This note may have been constructed using a voice recognition system  **

## 2022-07-19 NOTE — DISCHARGE INSTR - AVS FIRST PAGE
Dear Nathan Lamas,     It was our pleasure to care for you here at [facility]  At this time we provide for you here, the most important instructions / recommendations at discharge:     Notable Medication Adjustments -   Xarelto blood thinner to prevent stroke  Lipitor is for high cholesterol to prevent heart attack and stroke  Testing Required after Discharge -   None  Important follow up information -   Call your pcp to make an appt  Cardiology will call you to make an appt to fu  Other Instructions -   None  Please review this entire after visit summary as additional general instructions including medication list, appointments, activity, diet, any pertinent wound care, and other additional recommendations from your care team that may be provided for you        Sincerely,     Edwin Silver PA-C

## 2022-07-19 NOTE — CONSULTS
Consult - Cardiology   Denisse Parisi 66 y o  male MRN: [id number]  Unit/Bed#: [facility] 68 2 -01 Encounter: [id number]        Reason For Consult:  Atrial fibrillation                 Assessment:  Atrial fibrillation with accelerated ventricular rate   -Seen at emergent care with spontaneous return of sinus rhythm prior to ED arrival   -New diagnosis   -seeminly aware of heart rate and rhythm   -TSH normal 4 24   -No ETOH   -Some signs/Sx of PRAMOD w/o prior testing  Hypertension   -O/p Rx:  Irbesartan 150 mg daily  Other  CKD  History of right renal cell carcinoma, s/p nephrectomy      Discussion / Plan:         Echo pending   With chads Vasc score of at least 2 anticoagulation has appropriately been initiated - Xarelto 20 mg daily ~~> continue same   With sinus rates mid 50s-70s will defer empiric initiation of AV blocking Rx instead advising p r n  use of metoprolol tartrate (suggest 25 mg which can be repeated 30 minutes later if symptoms are ongoing)   follow-up with electrophysiologist to discuss alternative therapies-i e   Ablation , antiarrhythmic, routine AV blocking Rx      History Of Present Illness:  Denisse Parisi is a 67-year-old previously seen by cardiologist, Dr Elijah Walden in limited fashion for evaluation in advance of elective orthopedic procedures  He denies prior ischemic testing or echocardiogram     At current baseline this gentleman routinely exercises thrice weekly with a combination of aerobic and resistance training with good effort tolerance  Yesterday while at rest patient had some feelings of chest discomfort with some modest associated dyspnea and lightheadedness  With approximately 60 minutes of ongoing Sx he went to one of the [facility] facilities where an ECG showed atrial fibrillation with accelerated ventricular rate  This is a new diagnosis and was from there sent to the [facility]  Upon ED arrival he was back in NSR   He was admitted and our consultation requested  Past Medical History:        Past Medical History:   Diagnosis Date    Arthritis     Cancer [facility])     right kidney     Cellulitis of left leg     June 2016    Chronic kidney disease     Exercises 3 to 4 times per week     Full dentures     Hypertension     Inguinal hernia, right     last assessed: 7/6/2016    Malignant ne[facility] of kidney ([facility] [address] )     unspecified laterality Managed by Conrado Eubanks MD Last assessed: 3/1/2016    [facility]     Seasonal allergies     Thrombosis     superficial left leg June 2016    Varicose veins of both lower extremities       Past Surgical History:   Procedure Laterality Date    APPENDECTOMY      COLONOSCOPY      CYSTOSCOPY      Diagnostic    HERNIA REPAIR Right 7/21/2016    Procedure: REPAIR HERNIA INGUINAL, LAPAROSCOPIC;  Surgeon: Alirio Cardenas MD;  Location: [facility];  Service:     HIP ARTHROPLASTY Left     twice 1999 and 2011    [facility]    description: laparoscopic right inguinal hernia repair with mesh     NEPHRECTOMY Right     due to a tumor    ROTATOR CUFF REPAIR Left     TOTAL HIP ARTHROPLASTY      TOTAL HIP ARTHROPLASTY Bilateral         Allergy:        Allergies   Allergen Reactions    Nsaids Other (See Comments)     Instructed to avoid all ibuprofen's/NSAIDS  as pt only has one kidney       Medications:       Prior to Admission medications    Medication Sig Start Date End Date Taking?  Authorizing Provider   irbesartan (AVAPRO) 150 mg tablet Take 1 tablet (150 mg total) by mouth daily at bedtime 4/11/22  Yes Joshua Bhakta DO       Family History:     Family History   Problem Relation Age of Onset    Heart attack Mother     Cancer Father         Social History:       Social History     Socioeconomic History    Marital status: /Civil Union     Spouse name: None    Number of children: None    Years of education: None    Highest education level: None   Occupational History    None   Tobacco Use    Smoking status: Former Smoker    Smokeless tobacco: Never Used   Substance and Sexual Activity    Alcohol use: Never    Drug use: No    Sexual activity: None   Other Topics Concern    None   Social History Narrative    None     Social Determinants of Health     Financial Resource Strain: Not on file   Food Insecurity: Not on file   Transportation Needs: Not on file   Physical Activity: Not on file   Stress: Not on file   Social Connections: Not on file   Intimate Partner Violence: Not on file   Housing Stability: Not on file       ROS:  Symptoms per HPI  Occasional snoring and nocturnal arousals  The remainder of the review of systems is negative    Exam:  General:  Alert, normally conversant, comfortable appearing  Head: Normocephalic, atraumatic  Eyes:  EOMI  Pupils - equal, round, reactive to accomodation  No icterus  Normal Conjunctiva  Oropharynx: Moist without lesion  Neck:  No gross bruit, JVD, thyromegaly, or lymphadenopathy  Heart:  Regular with controlled rate  No rub nor pathologic murmur  Lungs:  Clear without rales/rhonchi/wheeze  Abdomen:  Soft and nontender with normal bowel sounds  No organomegaly or mass  Lower Limbs:  No edema  Superficial and deep varicosities noted  Pulses[de-identified]  RLE - DP:  1+                 LLE - DP:  1+  Musculoskeletal: Independent movement of limbs observed, Formal ROM and strength eval not performed  Neurologic:    Oriented to: person, place, situation  Cranial Nerves: grossly intact - vision, smell, taste, and hearing were not tested       Motor function: grossly normal, symmetric   Sensation: Was not tested      Vitals:    07/18/22 1944 07/18/22 2238 07/18/22 2305 07/19/22 0742   BP: 101/61 133/63 124/66 131/67   BP Location: Right arm Right arm Right arm    Pulse: 71 55 (!) 51 (!) 51   Resp:   16 16   Temp:   97 8 °F (36 6 °C) 98 °F (36 7 °C)   TempSrc:   Oral    SpO2: 97% 96% 95% 97%           DATA:      -----------    ECG:                          -----------------------------------------------------------------------------------------------------------------------------------------------  Weights: Wt Readings from Last 20 Encounters:   07/18/22 107 kg (235 lb)   02/21/22 107 kg (235 lb)   01/11/22 108 kg (238 lb)   11/02/21 107 kg (235 lb)   10/29/21 110 kg (242 lb)   10/27/21 109 kg (240 lb)   10/01/21 111 kg (245 lb)   04/22/21 123 kg (271 lb 3 2 oz)   03/24/21 124 kg (273 lb)   10/15/20 118 kg (260 lb)   10/08/20 119 kg (262 lb 6 oz)   07/21/20 119 kg (262 lb)   07/08/20 118 kg (260 lb)   06/17/20 118 kg (259 lb 12 8 oz)   06/08/20 118 kg (261 lb)   10/22/19 121 kg (267 lb)   08/17/19 117 kg (258 lb 9 6 oz)   04/12/19 122 kg (269 lb 3 2 oz)   04/02/19 118 kg (260 lb)   10/05/18 122 kg (269 lb 12 8 oz)   , There is no height or weight on file to calculate BMI           Lab Studies:           Results from last 7 days   Lab Units 07/14/22  0708   TRIGLYCERIDES mg/dL 169*   HDL mg/dL 36*     Results from last 7 days   Lab Units 07/19/22  0616 07/18/22  1228 07/14/22  0708   WBC Thousand/uL 11 33* 9 38 9 06   HEMOGLOBIN g/dL 12 9 14 0 13 6   HEMATOCRIT % 38 3 41 6 40 3   PLATELETS Thousands/uL 246 285 314   ,   Results from last 7 days   Lab Units 07/19/22  0616 07/18/22  1228 07/14/22  0708   POTASSIUM mmol/L 3 9 3 6 4 0   CHLORIDE mmol/L 107 105 109*   CO2 mmol/L 27 27 28   BUN mg/dL 21 22 20   CREATININE mg/dL 1 30 1 39* 1 35*   CALCIUM mg/dL 9 0 9 6 9 6   ALK PHOS U/L 69  --  70   ALT U/L 21  --  24   AST U/L 12  --  15

## 2022-07-19 NOTE — PLAN OF CARE
Problem: PAIN - ADULT  Goal: Verbalizes/displays adequate comfort level or baseline comfort level  Description: Interventions:  - Encourage patient to monitor pain and request assistance  - Assess pain using appropriate pain scale  - Administer analgesics based on type and severity of pain and evaluate response  - Implement non-pharmacological measures as appropriate and evaluate response  - Consider cultural and social influences on pain and pain management  - Notify physician/advanced practitioner if interventions unsuccessful or patient reports new pain  Outcome: Progressing     Problem: INFECTION - ADULT  Goal: Absence or prevention of progression during hospitalization  Description: INTERVENTIONS:  - Assess and monitor for signs and symptoms of infection  - Monitor lab/diagnostic results  - Monitor all insertion sites, i e  indwelling lines, tubes, and drains  - Monitor endotracheal if appropriate and nasal secretions for changes in amount and color  - [place] appropriate cooling/warming therapies per order  - Administer medications as ordered  - Instruct and encourage patient and family to use good hand hygiene technique  - Identify and instruct in appropriate isolation precautions for identified infection/condition  Outcome: Progressing     Problem: SAFETY ADULT  Goal: Patient will remain free of falls  Description: INTERVENTIONS:  - Educate patient/family on patient safety including physical limitations  - Instruct patient to call for assistance with activity   - Consult OT/PT to assist with strengthening/mobility   - Keep Call bell within reach  - Keep bed low and locked with side rails adjusted as appropriate  - Keep care items and personal belongings within reach  - Initiate and maintain comfort rounds  - Make Fall Risk Sign visible to staff  - Offer Toileting every  Hours, in advance of need  - Initiate/Maintain alarm  - Obtain necessary fall risk management equipment:   - Apply yellow socks and bracelet for high fall risk patients  - Consider moving patient to room near nurses station  Outcome: Progressing  Goal: Maintain or return to baseline ADL function  Description: INTERVENTIONS:  -  Assess patient's ability to carry out ADLs; assess patient's baseline for ADL function and identify physical deficits which impact ability to perform ADLs (bathing, care of mouth/teeth, toileting, grooming, dressing, etc )  - Assess/evaluate cause of self-care deficits   - Assess range of motion  - Assess patient's mobility; develop plan if impaired  - Assess patient's need for assistive devices and provide as appropriate  - Encourage maximum independence but intervene and supervise when necessary  - Involve family in performance of ADLs  - Assess for home care needs following discharge   - Consider OT consult to assist with ADL evaluation and planning for discharge  - Provide patient education as appropriate  Outcome: Progressing  Goal: Maintains/Returns to pre admission functional level  Description: INTERVENTIONS:  - Perform BMAT or MOVE assessment daily    - Set and communicate daily mobility goal to care team and patient/family/caregiver  - Collaborate with rehabilitation services on mobility goals if consulted  - Perform Range of Motion  times a day  - Reposition patient every  hours    - Dangle patient  times a day  - Stand patient  times a day  - Ambulate patient  times a day  - Out of bed to chair  times a day   - Out of bed for meals times a day  - Out of bed for toileting  - Record patient progress and toleration of activity level   Outcome: Progressing     Problem: DISCHARGE PLANNING  Goal: Discharge to home or other facility with appropriate resources  Description: INTERVENTIONS:  - Identify barriers to discharge w/patient and caregiver  - Arrange for needed discharge resources and transportation as appropriate  - Identify discharge learning needs (meds, wound care, etc )  - Arrange for interpretive services to assist at discharge as needed  - Refer to Case Management Department for coordinating discharge planning if the patient needs post-hospital services based on physician/advanced practitioner order or complex needs related to functional status, cognitive ability, or social support system  Outcome: Progressing     Problem: Knowledge Deficit  Goal: Patient/family/caregiver demonstrates understanding of disease process, treatment plan, medications, and discharge instructions  Description: Complete learning assessment and assess knowledge base    Interventions:  - Provide teaching at level of understanding  - Provide teaching via preferred learning methods  Outcome: Progressing     Problem: CARDIOVASCULAR - ADULT  Goal: Maintains optimal cardiac output and hemodynamic stability  Description: INTERVENTIONS:  - Monitor I/O, vital signs and rhythm  - Monitor for S/S and trends of decreased cardiac output  - Administer and titrate ordered vasoactive medications to optimize hemodynamic stability  - Assess quality of pulses, skin color and temperature  - Assess for signs of decreased coronary artery perfusion  - Instruct patient to report change in severity of symptoms  Outcome: Progressing  Goal: Absence of cardiac dysrhythmias or at baseline rhythm  Description: INTERVENTIONS:  - Continuous cardiac monitoring, vital signs, obtain 12 lead EKG if ordered  - Administer antiarrhythmic and heart rate control medications as ordered  - Monitor electrolytes and administer replacement therapy as ordered  Outcome: Progressing     Problem: HEMATOLOGIC - ADULT  Goal: Maintains hematologic stability  Description: INTERVENTIONS  - Assess for signs and symptoms of bleeding or hemorrhage  - Monitor labs  - Administer supportive blood products/factors as ordered and appropriate  Outcome: Progressing

## 2022-07-19 NOTE — ASSESSMENT & PLAN NOTE
· Blood pressure currently well controlled; reports compliance with irbesartan  · Continue present therapy

## 2022-07-19 NOTE — ASSESSMENT & PLAN NOTE
Lab Results   Component Value Date    EGFR 54 07/19/2022    EGFR 50 07/18/2022    EGFR 52 07/14/2022    CREATININE 1 30 07/19/2022    CREATININE 1 39 (H) 07/18/2022    CREATININE 1 35 (H) 07/14/2022     · Baseline creatinine appears to be approximately 1 5; patient at baseline  · Monitor and avoid nephrotoxic medications and relative hypotension

## 2022-07-19 NOTE — DISCHARGE SUMMARY
[address] [facility]  Discharge- Dann  1950, 66 y o  male MRN: [id number]  Unit/Bed#: [place] [address] [address] [place] [id number] Encounter: [id number]  Primary Care Provider: Lanette Terry DO   Date and time admitted to hospital: 7/18/2022 12:01 PM    * New onset a-fib [facility])  Assessment & Plan  Patient with complaints of chest pain and shortness of breath  · Noted to have heart rate in the 140s; EKG showed AFib RVR  · EKG on day of admission showed 1 episode of AFib RVR; currently rate controlled in normal sinus rhythm without BB therapy  · Started on Xarelto in the ED; chads Vasc -3  · Trop 11 - 28-43 -14  Echo reviewed    · Monitor on telemetry, consult to Cardiology appreciate recs    Acquired hypothyroidism  Assessment & Plan  · Patient reports no longer taking levothyroxine  · TSH on recent labs within normal limits  · Defer to primary care    Essential hypertension  Assessment & Plan  · Blood pressure currently well controlled; reports compliance with irbesartan  · Continue present therapy    Mixed hyperlipidemia  Assessment & Plan  · Patient reports he is no longer taking statin therapy  · Will re-initiate statin therapy    Stage 3 chronic kidney disease [facility])  Assessment & Plan  Lab Results   Component Value Date    EGFR 54 07/19/2022    EGFR 50 07/18/2022    EGFR 52 07/14/2022    CREATININE 1 30 07/19/2022    CREATININE 1 39 (H) 07/18/2022    CREATININE 1 35 (H) 07/14/2022     · Baseline creatinine appears to be approximately 1 5; patient at baseline  · Solitary kidney  · Monitor and avoid nephrotoxic medications and relative hypotension      Medical Problems             Resolved Problems  Date Reviewed: 7/18/2022   None               Discharging Physician / Practitioner: Star Brittle, PA-C  PCP: Lanette Terry DO  Admission Date:   Admission Orders (From admission, onward)     Ordered        07/19/22 0919  Inpatient Admission  Once            07/18/22 1545  Place in Observation  Once Discharge Date: 07/19/22    Consultations During Hospital Stay:  · cardiology    Procedures Performed:   Echo - Left Ventricle: Left ventricular cavity size is normal  Wall thickness is increased  There is mild to moderate concentric hypertrophy  The left ventricular ejection fraction is 60%  Systolic function is normal  Global longitudinal strain is normal at -19 5%  Wall motion is normal  Diastolic function is normal   ·     Significant Findings / Test Results:   X-ray chest 1 view portable   ED Interpretation by Jossy Banks MD (07/18 2583)   No acute cardiopulmonary disease      Final Result by Chitra Peck MD (07/18 7209)      No acute cardiopulmonary disease  Workstation performed: [id number]           Results Reviewed     Procedure Component Value Units Date/Time    HS Troponin I 4hr [[id number]]  (Abnormal) Collected: 07/18/22 1756    Lab Status: Final result Specimen: Blood from Arm, Left Updated: 07/18/22 1837     hs TnI 4hr 43 ng/L      Delta 4hr hsTnI 32 ng/L     COVID/FLU/RSV [[id number]]  (Normal) Collected: 07/18/22 1648    Lab Status: Final result Specimen: Nares from Nose Updated: 07/18/22 1734     SARS-CoV-2 Negative     INFLUENZA A PCR Negative     INFLUENZA B PCR Negative     RSV PCR Negative    Narrative:      FOR PEDIATRIC PATIENTS - copy/paste COVID Guidelines URL to browser: [email or web address]  [email or web address]    SARS-CoV-2 assay is a Nucleic Acid Amplification assay intended for the  qualitative detection of nucleic acid from SARS-CoV-2 in nasopharyngeal  swabs  Results are for the presumptive identification of SARS-CoV-2 RNA  Positive results are indicative of infection with SARS-CoV-2, the virus  causing COVID-19, but do not rule out bacterial infection or co-infection  with other viruses   Laboratories within the [place] and its  territories are required to report all positive results to the appropriate  public health authorities  Negative results do not preclude SARS-CoV-2  infection and should not be used as the sole basis for treatment or other  patient management decisions  Negative results must be combined with  clinical observations, patient history, and epidemiological information  This test has not been FDA cleared or approved  This test has been authorized by FDA under an Emergency Use Authorization  (EUA)  This test is only authorized for the duration of time the  declaration that circumstances exist justifying the authorization of the  emergency use of an in vitro diagnostic tests for detection of SARS-CoV-2  virus and/or diagnosis of COVID-19 infection under section 564(b)(1) of  the Act, 21 U  S C  062WOV-9(X)(4), unless the authorization is terminated  or revoked sooner  The test has been validated but independent review by FDA  and CLIA is pending  Test performed using [employer] Gene[employer]pert: This RT-PCR assay targets N2,  a region unique to SARS-CoV-2  A conserved region in the E-gene was chosen  for pan-Sarbecovirus detection which includes SARS-CoV-2      HS Troponin I 2hr [[id number]]  (Normal) Collected: 07/18/22 1441    Lab Status: Final result Specimen: Blood from Arm, Left Updated: 07/18/22 1512     hs TnI 2hr 28 ng/L      Delta 2hr hsTnI 17 ng/L     Basic metabolic panel [[id number]]  (Abnormal) Collected: 07/18/22 1228    Lab Status: Final result Specimen: Blood from Arm, Left Updated: 07/18/22 1305     Sodium 143 mmol/L      Potassium 3 6 mmol/L      Chloride 105 mmol/L      CO2 27 mmol/L      ANION GAP 11 mmol/L      BUN 22 mg/dL      Creatinine 1 39 mg/dL      Glucose 123 mg/dL      Calcium 9 6 mg/dL      eGFR 50 ml/min/1 73sq m     Narrative:      Disha guidelines for Chronic Kidney Disease (CKD):     Stage 1 with normal or high GFR (GFR > 90 mL/min/1 73 square meters)    Stage 2 Mild CKD (GFR = 60-89 mL/min/1 73 square meters)    Stage 3A Moderate CKD (GFR = 45-59 mL/min/1 73 square meters)    Stage 3B Moderate CKD (GFR = 30-44 mL/min/1 73 square meters)    Stage 4 Severe CKD (GFR = 15-29 mL/min/1 73 square meters)    Stage 5 End Stage CKD (GFR <15 mL/min/1 73 square meters)  Note: GFR calculation is accurate only with a steady state creatinine    NT-BNP PRO [[id number]]  (Abnormal) Collected: 07/18/22 1228    Lab Status: Final result Specimen: Blood from Arm, Left Updated: 07/18/22 1305     NT-proBNP 249 pg/mL     D-dimer, quantitative [[id number]]  (Abnormal) Collected: 07/18/22 1228    Lab Status: Final result Specimen: Blood from Arm, Left Updated: 07/18/22 1300     D-Dimer, Quant 0 58 ug/ml FEU     Narrative: In the evaluation for possible pulmonary embolism, in the appropriate (Well's Score of 4 or less) patient, the age adjusted d-dimer cutoff for this patient can be calculated as:    Age x 0 01 (in ug/mL) for Age-adjusted D-dimer exclusion threshold for a patient over 50 years      HS Troponin 0hr (reflex protocol) [[id number]]  (Normal) Collected: 07/18/22 1228    Lab Status: Final result Specimen: Blood from Arm, Left Updated: 07/18/22 1258     hs TnI 0hr 11 ng/L     CBC and differential [[id number]]  (Abnormal) Collected: 07/18/22 1228    Lab Status: Final result Specimen: Blood from Arm, Left Updated: 07/18/22 1233     WBC 9 38 Thousand/uL      RBC 4 65 Million/uL      Hemoglobin 14 0 g/dL      Hematocrit 41 6 %      MCV 90 fL      MCH 30 1 pg      MCHC 33 7 g/dL      RDW 11 9 %      MPV 9 3 fL      Platelets 525 Thousands/uL      nRBC 0 /100 WBCs      Neutrophils Relative 74 %      Immat GRANS % 1 %      Lymphocytes Relative 13 %      Monocytes Relative 8 %      Eosinophils Relative 3 %      Basophils Relative 1 %      Neutrophils Absolute 7 05 Thousands/µL      Immature Grans Absolute 0 05 Thousand/uL      Lymphocytes Absolute 1 21 Thousands/µL      Monocytes Absolute 0 76 Thousand/µL      Eosinophils Absolute 0 24 Thousand/µL Basophils Absolute 0 07 Thousands/µL             Incidental Findings:   · None    Test Results Pending at Discharge (will require follow up): · None     Outpatient Tests Requested:  · None    Complications:  None    Reason for Admission: Afib with RVR on EKG from urgent care    Hospital Course:   Kuldeep Vega is a 66 y o  male patient with past medical history of solitary kidney status post nephrectomy for renal cell carcinoma, hypertension, hyperlipidemia, hypothyroidism, BPH, cholelithiasis, ED, awake, Gilbert's syndrome, history of AAA without rupture, atherosclerosis who originally presented to the hospital on 7/18/2022 due to AFib with RVR on EKG at the urgent care  By the time he arrived in the emergency room he was in normal sinus rhythm  He was not started on beta-blocker therapy because of this reason  He was started on Xarelto which had price check 37 dollars a month  There was elevated troponin switch decreased once AFib was controlled  EKG without ischemic changes  Telemetry no repeat arrhythmia overnight  The echo was overall normal   Cardiology signed off  Patient and his wife about all the questions answered and they are amenable to his discharge home today  Please see above list of diagnoses and related plan for additional information  Condition at Discharge: stable    Discharge Day Visit / Exam:   * Please refer to separate progress note for these details *    Discussion with Family: Updated  (wife) via phone  Discharge instructions/Information to patient and family:   See after visit summary for information provided to patient and family  Provisions for Follow-Up Care:  See after visit summary for information related to follow-up care and any pertinent home health orders  Disposition:   Home    Planned Readmission: none     Discharge Statement:  I spent 45 minutes discharging the patient  This time was spent on the day of discharge   I had direct contact with the patient on the day of discharge  Greater than 50% of the total time was spent examining patient, answering all patient questions, arranging and discussing plan of care with patient as well as directly providing post-discharge instructions  Additional time then spent on discharge activities  Discharge Medications:  See after visit summary for reconciled discharge medications provided to patient and/or family        **Please Note: This note may have been constructed using a voice recognition system**

## 2022-07-19 NOTE — ASSESSMENT & PLAN NOTE
· Patient reports no longer taking levothyroxine  · TSH on recent labs within normal limits  · Defer to primary care

## 2022-07-19 NOTE — ASSESSMENT & PLAN NOTE
Patient with complaints of chest pain and shortness of breath  · Noted to have heart rate in the 140s; EKG showed AFib RVR  · EKG on day of admission showed 1 episode of AFib RVR; currently rate controlled in normal sinus rhythm without BB therapy  · Started on Xarelto in the ED; chads Vasc -3  · Trop 11 - 28-43 -14  Echo reviewed    · Monitor on telemetry, consult to Cardiology capo ramirez

## 2022-07-19 NOTE — ASSESSMENT & PLAN NOTE
Lab Results   Component Value Date    EGFR 54 07/19/2022    EGFR 50 07/18/2022    EGFR 52 07/14/2022    CREATININE 1 30 07/19/2022    CREATININE 1 39 (H) 07/18/2022    CREATININE 1 35 (H) 07/14/2022     · Baseline creatinine appears to be approximately 1 5; patient at baseline  · Solitary kidney  · Monitor and avoid nephrotoxic medications and relative hypotension

## 2022-07-19 NOTE — ASSESSMENT & PLAN NOTE
Patient with complaints of chest pain and shortness of breath  · Noted to have heart rate in the 140s; EKG showed AFib RVR  · EKG on day of admission showed 1 episode of AFib RVR; currently rate controlled in normal sinus rhythm without BB therapy  · Started on Xarelto in the ED; chads Vasc -3  · Trop 11 - 28-43 -14  Echo ordered    · Monitor on telemetry, consult to Cardiology

## 2022-07-19 NOTE — PLAN OF CARE
Problem: PAIN - ADULT  Goal: Verbalizes/displays adequate comfort level or baseline comfort level  Description: Interventions:  - Encourage patient to monitor pain and request assistance  - Assess pain using appropriate pain scale  - Administer analgesics based on type and severity of pain and evaluate response  - Implement non-pharmacological measures as appropriate and evaluate response  - Consider cultural and social influences on pain and pain management  - Notify physician/advanced practitioner if interventions unsuccessful or patient reports new pain  Outcome: Progressing     Problem: DISCHARGE PLANNING  Goal: Discharge to home or other facility with appropriate resources  Description: INTERVENTIONS:  - Identify barriers to discharge w/patient and caregiver  - Arrange for needed discharge resources and transportation as appropriate  - Identify discharge learning needs (meds, wound care, etc )  - Arrange for interpretive services to assist at discharge as needed  - Refer to Case Management Department for coordinating discharge planning if the patient needs post-hospital services based on physician/advanced practitioner order or complex needs related to functional status, cognitive ability, or social support system  Outcome: Progressing     Problem: CARDIOVASCULAR - ADULT  Goal: Maintains optimal cardiac output and hemodynamic stability  Description: INTERVENTIONS:  - Monitor I/O, vital signs and rhythm  - Monitor for S/S and trends of decreased cardiac output  - Administer and titrate ordered vasoactive medications to optimize hemodynamic stability  - Assess quality of pulses, skin color and temperature  - Assess for signs of decreased coronary artery perfusion  - Instruct patient to report change in severity of symptoms  Outcome: Progressing  Goal: Absence of cardiac dysrhythmias or at baseline rhythm  Description: INTERVENTIONS:  - Continuous cardiac monitoring, vital signs, obtain 12 lead EKG if ordered  - Administer antiarrhythmic and heart rate control medications as ordered  - Monitor electrolytes and administer replacement therapy as ordered  Outcome: Progressing

## 2022-07-20 ENCOUNTER — OFFICE VISIT (OUTPATIENT)
Dept: FAMILY MEDICINE CLINIC | Facility: CLINIC | Age: 72
End: 2022-07-20
Payer: COMMERCIAL

## 2022-07-20 VITALS
BODY MASS INDEX: 27.94 KG/M2 | SYSTOLIC BLOOD PRESSURE: 128 MMHG | HEART RATE: 70 BPM | DIASTOLIC BLOOD PRESSURE: 84 MMHG | WEIGHT: 236.6 LBS | HEIGHT: 77 IN

## 2022-07-20 DIAGNOSIS — E80.4 GILBERT'S SYNDROME: ICD-10-CM

## 2022-07-20 DIAGNOSIS — C64.1 MALIGNANT NEOPLASM OF RIGHT KIDNEY, EXCEPT RENAL PELVIS (HCC): ICD-10-CM

## 2022-07-20 DIAGNOSIS — Z79.01 ANTICOAGULATED BY ANTICOAGULATION TREATMENT: ICD-10-CM

## 2022-07-20 DIAGNOSIS — R91.8 PULMONARY NODULES: ICD-10-CM

## 2022-07-20 DIAGNOSIS — I10 ESSENTIAL HYPERTENSION: ICD-10-CM

## 2022-07-20 DIAGNOSIS — I48.91 NEW ONSET A-FIB (HCC): Primary | ICD-10-CM

## 2022-07-20 DIAGNOSIS — E03.9 ACQUIRED HYPOTHYROIDISM: ICD-10-CM

## 2022-07-20 DIAGNOSIS — I71.40 ABDOMINAL AORTIC ANEURYSM (AAA) WITHOUT RUPTURE: ICD-10-CM

## 2022-07-20 DIAGNOSIS — R73.9 HYPERGLYCEMIA: ICD-10-CM

## 2022-07-20 DIAGNOSIS — N18.31 STAGE 3A CHRONIC KIDNEY DISEASE (HCC): ICD-10-CM

## 2022-07-20 DIAGNOSIS — E78.2 MIXED HYPERLIPIDEMIA: ICD-10-CM

## 2022-07-20 DIAGNOSIS — I48.91 NEW ONSET ATRIAL FIBRILLATION (HCC): ICD-10-CM

## 2022-07-20 DIAGNOSIS — I70.0 ABDOMINAL AORTIC ATHEROSCLEROSIS (HCC): ICD-10-CM

## 2022-07-20 PROCEDURE — 1111F DSCHRG MED/CURRENT MED MERGE: CPT | Performed by: FAMILY MEDICINE

## 2022-07-20 PROCEDURE — 3725F SCREEN DEPRESSION PERFORMED: CPT | Performed by: FAMILY MEDICINE

## 2022-07-20 PROCEDURE — 99214 OFFICE O/P EST MOD 30 MIN: CPT | Performed by: FAMILY MEDICINE

## 2022-07-20 PROCEDURE — 1160F RVW MEDS BY RX/DR IN RCRD: CPT | Performed by: FAMILY MEDICINE

## 2022-07-20 RX ORDER — ATORVASTATIN CALCIUM 40 MG/1
40 TABLET, FILM COATED ORAL
Qty: 90 TABLET | Refills: 3 | Status: SHIPPED | OUTPATIENT
Start: 2022-07-20

## 2022-07-20 NOTE — ASSESSMENT & PLAN NOTE
Lab Results   Component Value Date    EGFR 54 07/19/2022    EGFR 50 07/18/2022    EGFR 52 07/14/2022    CREATININE 1 30 07/19/2022    CREATININE 1 39 (H) 07/18/2022    CREATININE 1 35 (H) 07/14/2022   Continue Arb therapy

## 2022-07-20 NOTE — ASSESSMENT & PLAN NOTE
Converted from urgent care to ER 07/18/2021 from AFib to normal sinus and normal sinus at the present time  Patient is to continue Xarelto    Will refer to Cardiology for formal follow-up

## 2022-07-20 NOTE — PATIENT INSTRUCTIONS
Continue Xarelto 20 mg daily, may check with pharmacist to see if there is a cheaper alternative   Continue atorvastatin 40 mg daily to lower cholesterol   Follow-up with cardiology for evaluation of atrial fibrillation   Follow-up vascular surgery per their instructions  Complete CT scan of chest for pulmonary nodules with history of kidney cancer   Return in 3 months for office visit, blood work, and a [place]

## 2022-07-20 NOTE — PROGRESS NOTES
Assessment and Plan:    1  New onset AFib  2  Anticoagulated on Xarelto  Explained need for Xarelto secondary great increased risk of stroke with atrial fibrillation  Refer to Cardiology for routine follow-up  3  Right kidney cancer status post nephrectomy  4  Pulmonary nodule   Patient was to have CT redone 3/22 was not, CT reordered of chest   5  Hyperlipidemia, atorvastatin 40 mg daily started at ER importance discussed   6  Hyperglycemia diet-controlled   7  Gilbert's syndrome, continue to monitor bilirubin  8  Hypothyroidism, stable off medication will monitor  9  Abdominal aortic aneurysm/abdominal atherosclerosis, follow-up vascular surgery   10  CKD-3, stable on Arb  11  Return in 3 months for office visit blood work sooner if needed  Problem List Items Addressed This Visit        Endocrine    Acquired hypothyroidism     Stable off medication         Relevant Orders    Comprehensive metabolic panel    Lipid Panel with Direct LDL reflex    Hemoglobin A1C    TSH, 3rd generation with Free T4 reflex       Cardiovascular and Mediastinum    Essential hypertension     Stable continue present therapy         Relevant Orders    Comprehensive metabolic panel    Lipid Panel with Direct LDL reflex    Hemoglobin A1C    TSH, 3rd generation with Free T4 reflex    Abdominal aortic aneurysm (AAA) without rupture (HCC)     To follow-up with vascular surgery         Relevant Orders    Comprehensive metabolic panel    Lipid Panel with Direct LDL reflex    Hemoglobin A1C    TSH, 3rd generation with Free T4 reflex    Abdominal aortic atherosclerosis (HCC)     Continue Xarelto/statin therapy         Relevant Orders    Comprehensive metabolic panel    Lipid Panel with Direct LDL reflex    Hemoglobin A1C    TSH, 3rd generation with Free T4 reflex    New onset a-fib (HCC) - Primary     Converted from urgent care to ER 07/18/2021 from AFib to normal sinus and normal sinus at the present time  Patient is to continue Xarelto    Will refer to Cardiology for formal follow-up         Relevant Orders    Ambulatory Referral to Cardiology    Comprehensive metabolic panel    Lipid Panel with Direct LDL reflex    Hemoglobin A1C    TSH, 3rd generation with Free T4 reflex       Genitourinary    Stage 3 chronic kidney disease ([facility] [address] )     Lab Results   Component Value Date    EGFR 54 07/19/2022    EGFR 50 07/18/2022    EGFR 52 07/14/2022    CREATININE 1 30 07/19/2022    CREATININE 1 39 (H) 07/18/2022    CREATININE 1 35 (H) 07/14/2022   Continue Arb therapy         Relevant Orders    Comprehensive metabolic panel    Lipid Panel with Direct LDL reflex    Hemoglobin A1C    TSH, 3rd generation with Free T4 reflex    Malignant neoplasm of right kidney, except renal pelvis (HCC)     CT chest secondary to pulmonary nodules         Relevant Orders    CT chest wo contrast    Comprehensive metabolic panel    Lipid Panel with Direct LDL reflex    Hemoglobin A1C    TSH, 3rd generation with Free T4 reflex       Other    Hyperglycemia    Relevant Orders    Comprehensive metabolic panel    Lipid Panel with Direct LDL reflex    Hemoglobin A1C    TSH, 3rd generation with Free T4 reflex    Mixed hyperlipidemia     Atorvastatin 40 mg ordered at ER         Relevant Medications    atorvastatin (LIPITOR) 40 mg tablet    Other Relevant Orders    Comprehensive metabolic panel    Lipid Panel with Direct LDL reflex    Hemoglobin A1C    TSH, 3rd generation with Free T4 reflex    Gilbert's syndrome    Relevant Orders    Comprehensive metabolic panel    Lipid Panel with Direct LDL reflex    Hemoglobin A1C    TSH, 3rd generation with Free T4 reflex    Pulmonary nodules    Relevant Orders    CT chest wo contrast    Comprehensive metabolic panel    Lipid Panel with Direct LDL reflex    Hemoglobin A1C    TSH, 3rd generation with Free T4 reflex    Anticoagulated by anticoagulation treatment     Explained risk of stroke with atrial fibrillation    I recommend continuing Xarelto 20 mg daily Relevant Orders    Comprehensive metabolic panel    Lipid Panel with Direct LDL reflex    Hemoglobin A1C    TSH, 3rd generation with Free T4 reflex      Other Visit Diagnoses     New onset atrial fibrillation (HCC)        Relevant Medications    rivaroxaban (XARELTO) 20 mg tablet    Other Relevant Orders    Comprehensive metabolic panel    Lipid Panel with Direct LDL reflex    Hemoglobin A1C    TSH, 3rd generation with Free T4 reflex                 Diagnoses and all orders for this visit:    New onset a-fib [facility])  -     Ambulatory Referral to Cardiology; Future  -     Comprehensive metabolic panel; Future  -     Lipid Panel with Direct LDL reflex; Future  -     Hemoglobin A1C; Future  -     TSH, 3rd generation with Free T4 reflex; Future    New onset atrial fibrillation (HCC)  -     rivaroxaban (XARELTO) 20 mg tablet; Take 1 tablet (20 mg total) by mouth daily with dinner  -     Comprehensive metabolic panel; Future  -     Lipid Panel with Direct LDL reflex; Future  -     Hemoglobin A1C; Future  -     TSH, 3rd generation with Free T4 reflex; Future    Mixed hyperlipidemia  -     atorvastatin (LIPITOR) 40 mg tablet; Take 1 tablet (40 mg total) by mouth daily with dinner  -     Comprehensive metabolic panel; Future  -     Lipid Panel with Direct LDL reflex; Future  -     Hemoglobin A1C; Future  -     TSH, 3rd generation with Free T4 reflex; Future    Essential hypertension  -     Comprehensive metabolic panel; Future  -     Lipid Panel with Direct LDL reflex; Future  -     Hemoglobin A1C; Future  -     TSH, 3rd generation with Free T4 reflex; Future    Abdominal aortic atherosclerosis (HCC)  -     Comprehensive metabolic panel; Future  -     Lipid Panel with Direct LDL reflex; Future  -     Hemoglobin A1C; Future  -     TSH, 3rd generation with Free T4 reflex; Future    Abdominal aortic aneurysm (AAA) without rupture (HCC)  -     Comprehensive metabolic panel;  Future  -     Lipid Panel with Direct LDL reflex; Future  -     Hemoglobin A1C; Future  -     TSH, 3rd generation with Free T4 reflex; Future    Acquired hypothyroidism  -     Comprehensive metabolic panel; Future  -     Lipid Panel with Direct LDL reflex; Future  -     Hemoglobin A1C; Future  -     TSH, 3rd generation with Free T4 reflex; Future    Gilbert's syndrome  -     Comprehensive metabolic panel; Future  -     Lipid Panel with Direct LDL reflex; Future  -     Hemoglobin A1C; Future  -     TSH, 3rd generation with Free T4 reflex; Future    Hyperglycemia  -     Comprehensive metabolic panel; Future  -     Lipid Panel with Direct LDL reflex; Future  -     Hemoglobin A1C; Future  -     TSH, 3rd generation with Free T4 reflex; Future    Pulmonary nodules  -     CT chest wo contrast; Future  -     Comprehensive metabolic panel; Future  -     Lipid Panel with Direct LDL reflex; Future  -     Hemoglobin A1C; Future  -     TSH, 3rd generation with Free T4 reflex; Future    Malignant neoplasm of right kidney, except renal pelvis (HCC)  -     CT chest wo contrast; Future  -     Comprehensive metabolic panel; Future  -     Lipid Panel with Direct LDL reflex; Future  -     Hemoglobin A1C; Future  -     TSH, 3rd generation with Free T4 reflex; Future    Stage 3a chronic kidney disease (HCC)  -     Comprehensive metabolic panel; Future  -     Lipid Panel with Direct LDL reflex; Future  -     Hemoglobin A1C; Future  -     TSH, 3rd generation with Free T4 reflex; Future    Anticoagulated by anticoagulation treatment  -     Comprehensive metabolic panel; Future  -     Lipid Panel with Direct LDL reflex; Future  -     Hemoglobin A1C; Future  -     TSH, 3rd generation with Free T4 reflex; Future            Subjective:      Patient ID: Ej Raphael is a 66 y o  male  CC:    Chief Complaint   Patient presents with    Follow-up     Pt is present today for chronic condition follow up       HPI:    Patient was seen Monday [facility] ER for chest pain/new onset AFib    Seen in urgent care found to be in AFib sent to ER via EMS in ER patient is normal sinus rhythm patient doing well patient was observed and discharged on Xarelto  And atorvastatin  Patient is to make appointment for follow-up with Cardiology  Blood work was discussed      The following portions of the patient's history were reviewed and updated as appropriate: allergies, current medications, past family history, past medical history, past social history, past surgical history and problem list       Review of Systems   Constitutional: Negative  HENT: Negative  Eyes: Negative  Respiratory:        Patient was to have follow-up CT of chest 1/22 secondary to pulmonary nodules patient did not complete, I will reorder   Cardiovascular:        HPI   Gastrointestinal: Negative  Endocrine: Negative  Genitourinary: Negative  Musculoskeletal: Negative  Skin: Negative  Allergic/Immunologic: Negative  Neurological: Negative  Hematological: Negative  Psychiatric/Behavioral: Negative  Data to review:       Objective:    Vitals:    07/20/22 1052   BP: 128/84   BP Location: Right arm   Patient Position: Sitting   Cuff Size: Standard   Pulse: 70   Weight: 107 kg (236 lb 9 6 oz)   Height: 6' 5" (1 956 m)        Physical Exam  Vitals and nursing note reviewed  Constitutional:       Appearance: Normal appearance  HENT:      Head: Normocephalic and atraumatic  Eyes:      General: No scleral icterus  Neck:      Vascular: No carotid bruit  Cardiovascular:      Rate and Rhythm: Normal rate and regular rhythm  Heart sounds: Normal heart sounds  Pulmonary:      Effort: Pulmonary effort is normal       Breath sounds: Normal breath sounds  Abdominal:      General: Bowel sounds are normal       Palpations: Abdomen is soft  Tenderness: There is no abdominal tenderness  Musculoskeletal:      Cervical back: Neck supple  Right lower leg: No edema  Left lower leg: No edema  Skin:     General: Skin is warm and dry  Neurological:      General: No focal deficit present  Mental Status: He is alert  Psychiatric:         Mood and Affect: Mood normal              Depression Screening and Follow-up Plan: Patient was screened for depression during today's encounter  They screened negative with a PHQ-2 score of 0

## 2022-07-27 ENCOUNTER — HOSPITAL ENCOUNTER (OUTPATIENT)
Dept: CT IMAGING | Facility: HOSPITAL | Age: 72
Discharge: HOME | End: 2022-07-27
Payer: COMMERCIAL

## 2022-07-27 DIAGNOSIS — C64.1 MALIGNANT NEOPLASM OF RIGHT KIDNEY, EXCEPT RENAL PELVIS (HCC): ICD-10-CM

## 2022-07-27 DIAGNOSIS — R91.8 PULMONARY NODULES: ICD-10-CM

## 2022-07-27 PROCEDURE — G1004 CDSM NDSC: HCPCS

## 2022-07-27 PROCEDURE — 71250 CT THORAX DX C-: CPT

## 2022-08-03 DIAGNOSIS — I71.20 THORACIC AORTIC ANEURYSM WITHOUT RUPTURE (HCC): Primary | ICD-10-CM

## 2022-08-03 PROBLEM — I71.2 THORACIC AORTIC ANEURYSM WITHOUT RUPTURE (HCC): Status: ACTIVE | Noted: 2022-08-03

## 2022-08-03 PROBLEM — R91.8 PULMONARY NODULES: Status: RESOLVED | Noted: 2021-10-20 | Resolved: 2022-08-03

## 2022-08-03 PROBLEM — C64.1 MALIGNANT NEOPLASM OF RIGHT KIDNEY, EXCEPT RENAL PELVIS (HCC): Status: RESOLVED | Noted: 2018-04-04 | Resolved: 2022-08-03

## 2022-08-09 DIAGNOSIS — I48.91 NEW ONSET ATRIAL FIBRILLATION (HCC): ICD-10-CM

## 2022-08-10 ENCOUNTER — TELEPHONE (OUTPATIENT)
Dept: FAMILY MEDICINE CLINIC | Facility: CLINIC | Age: 72
End: 2022-08-10

## 2022-08-10 NOTE — TELEPHONE ENCOUNTER
Patient came inside the office and stated that his perscription for rivaroxaban 20 mg was sent to the wrong pharmacy  Patient would like it sent to the [facility] mail in order a 90 day supply with 3 refills   Please advise, thank you

## 2022-08-18 ENCOUNTER — OFFICE VISIT (OUTPATIENT)
Dept: CARDIAC SURGERY | Facility: CLINIC | Age: 72
End: 2022-08-18
Payer: COMMERCIAL

## 2022-08-18 VITALS
SYSTOLIC BLOOD PRESSURE: 171 MMHG | HEART RATE: 54 BPM | WEIGHT: 237 LBS | DIASTOLIC BLOOD PRESSURE: 88 MMHG | OXYGEN SATURATION: 100 % | BODY MASS INDEX: 27.98 KG/M2 | HEIGHT: 77 IN | RESPIRATION RATE: 18 BRPM

## 2022-08-18 DIAGNOSIS — R91.8 PULMONARY NODULES: ICD-10-CM

## 2022-08-18 DIAGNOSIS — Z13.6 ENCOUNTER FOR SPECIAL SCREENING EXAMINATION FOR CARDIOVASCULAR DISORDER: Primary | ICD-10-CM

## 2022-08-18 DIAGNOSIS — I71.20 THORACIC AORTIC ANEURYSM WITHOUT RUPTURE: ICD-10-CM

## 2022-08-18 PROCEDURE — 1160F RVW MEDS BY RX/DR IN RCRD: CPT | Performed by: PHYSICIAN ASSISTANT

## 2022-08-18 PROCEDURE — 99205 OFFICE O/P NEW HI 60 MIN: CPT | Performed by: PHYSICIAN ASSISTANT

## 2022-08-18 PROCEDURE — 3079F DIAST BP 80-89 MM HG: CPT | Performed by: PHYSICIAN ASSISTANT

## 2022-08-18 PROCEDURE — 3077F SYST BP >= 140 MM HG: CPT | Performed by: PHYSICIAN ASSISTANT

## 2022-08-18 PROCEDURE — 1111F DSCHRG MED/CURRENT MED MERGE: CPT | Performed by: PHYSICIAN ASSISTANT

## 2022-08-18 RX ORDER — MULTIVITAMIN
1 CAPSULE ORAL DAILY
COMMUNITY

## 2022-08-18 NOTE — LETTER
August 18, 2022     Damian Cisneros DO  [address] [place], [place] [id number]    Patient: David Rose   YOB: 1950   Date of Visit: 8/18/2022       Dear Dr Ilia Sherman: Thank you for referring Paulie Perez to me for evaluation  Below are my notes for this consultation  If you have questions, please do not hesitate to call me  I look forward to following your patient along with you  Sincerely,        Ana Felix MD        CC: No Recipients  [place], [place]  8/18/2022 10:15 AM  Attested  Consultation - Cardiothoracic Surgery   David Rose 66 y o  male MRN: [id number]    Physician Requesting Consult: Dr Asa Cisneros    Reason for Consult / Principal Problem: Thoracic Aortic aneurysm    History of Present Illness: David Rose is a 66y o  year old male who presents today for surgical consultation for ascending aortic aneurysm, which was incidentally found on surveillance CT chest for pulmonary nodule monitoring  The patient has a history of an abdominal aortic aneurysm measuring 3 2cm  He follows routinely with Dr Alvaro Loaiza of Vascular Surgery  He also has a PMH of Afib, newly diagnosed in July 2023, and takes Xarelto  He has HTN, HLD, CKD 3, Right Renal Cell Carcinoma s/p Right Nephrectomy, Hypothyroidism, OA, BPH, Colitis, Gilbert's Syndrome, LE venous varicosities and ED  He lives at home with his wife and reports he is active  He quit smoking in 2006, denies alcohol or drug use  He denies any family history of aneurysms or sudden death  His brother had a heart attack       Past Medical History:  Past Medical History:   Diagnosis Date    Arthritis     Cancer [facility])     right kidney     Cellulitis of left leg     June 2016    Chronic kidney disease     Exercises 3 to 4 times per week     Full dentures     History of colonoscopy 2021    Hypertension     Inguinal hernia, right     last assessed: 7/6/2016    Malignant neoplasm of kidney ([address] ) unspecified laterality Managed by Erendira Bustos MD Last assessed: 3/1/2016    Renal cell carcinoma ([facility] [address] )     Seasonal allergies     Thrombosis     superficial left leg 2016    Varicose veins of both lower extremities          Past Surgical History:   Past Surgical History:   Procedure Laterality Date    APPENDECTOMY      COLONOSCOPY      CYSTOSCOPY      Diagnostic    HERNIA REPAIR Right 2016    Procedure: REPAIR HERNIA INGUINAL, LAPAROSCOPIC;  Surgeon: Zainab Palacios MD;  Location: [facility];  Service:     HIP ARTHROPLASTY Left     twice  and     HIP ARTHROPLASTY Right         LAPAROSCOPIC INGUINAL HERNIA REPAIR  2016    description: laparoscopic right inguinal hernia repair with mesh     NEPHRECTOMY Right     due to a tumor    ROTATOR CUFF REPAIR Left     TOTAL HIP ARTHROPLASTY      TOTAL HIP ARTHROPLASTY Bilateral          Family History:  Family History   Problem Relation Age of Onset    Heart attack Mother     Cancer Father          Social History:    Social History     Substance and Sexual Activity   Alcohol Use Never     Social History     Substance and Sexual Activity   Drug Use No     Social History     Tobacco Use   Smoking Status Former Smoker    Types: Cigarettes    Quit date: 2006    Years since quittin 0   Smokeless Tobacco Never Used           Home Medications:   Prior to Admission medications    Medication Sig Start Date End Date Taking?  Authorizing Provider   atorvastatin (LIPITOR) 40 mg tablet Take 1 tablet (40 mg total) by mouth daily with dinner 22  Yes Alex Maradiaga DO   irbesartan (AVAPRO) 150 mg tablet Take 1 tablet (150 mg total) by mouth daily at bedtime 22  Yes Alex Maradiaga DO   Multiple Vitamin (multivitamin) capsule Take 1 capsule by mouth daily   Yes Historical Provider, MD   rivaroxaban (XARELTO) 20 mg tablet Take 1 tablet (20 mg total) by mouth daily with dinner 8/10/22  Yes Miguel Lisa DO Allergies: Allergies   Allergen Reactions    Nsaids Other (See Comments)     Instructed to avoid all ibuprofen's/NSAIDS  as pt only has one kidney       Review of Systems:     Review of Systems   Constitutional: Negative  Negative for chills, diaphoresis, fatigue and fever  HENT: Negative  Eyes: Negative  Respiratory: Negative  Negative for chest tightness and shortness of breath  Cardiovascular: Negative  Negative for chest pain and leg swelling  Gastrointestinal: Negative  Endocrine: Negative  Genitourinary: Negative  Musculoskeletal: Negative  Skin: Negative  Allergic/Immunologic: Negative  Neurological: Negative  Negative for syncope  Hematological: Negative for adenopathy  Does not bruise/bleed easily  Psychiatric/Behavioral: Negative  All other systems reviewed and are negative  Vital Signs:     Vitals:    08/18/22 0937 08/18/22 0941   BP: (!) 173/85 (!) 171/88   BP Location: Left arm Right arm   Patient Position: Sitting Sitting   Cuff Size: Standard Standard   Pulse: (!) 54    Resp: 18    SpO2: 100%    Weight: 108 kg (237 lb)    Height: 6' 5" (1 956 m)        Physical Exam:     Physical Exam  Vitals and nursing note reviewed  Constitutional:       General: He is not in acute distress  Appearance: Normal appearance  He is well-developed  He is not diaphoretic  HENT:      Head: Normocephalic and atraumatic  Right Ear: External ear normal       Left Ear: External ear normal       Nose: Nose normal       Mouth/Throat:      Pharynx: No oropharyngeal exudate  Eyes:      General: No scleral icterus  Right eye: No discharge  Left eye: No discharge  Conjunctiva/sclera: Conjunctivae normal       Pupils: Pupils are equal, round, and reactive to light  Neck:      Vascular: No JVD  Trachea: No tracheal deviation  Cardiovascular:      Rate and Rhythm: Normal rate and regular rhythm  Heart sounds: Normal heart sounds   No murmur heard  No friction rub  Pulmonary:      Effort: Pulmonary effort is normal  No respiratory distress  Breath sounds: Normal breath sounds  No stridor  No wheezing or rales  Abdominal:      General: Bowel sounds are normal  There is no distension  Palpations: Abdomen is soft  There is no mass  Tenderness: There is no abdominal tenderness  There is no guarding  Musculoskeletal:         General: No tenderness or deformity  Normal range of motion  Cervical back: Normal range of motion and neck supple  Right lower leg: No edema  Left lower leg: No edema  Skin:     General: Skin is warm and dry  Coloration: Skin is not pale  Findings: No erythema or rash  Neurological:      General: No focal deficit present  Mental Status: He is alert and oriented to person, place, and time  Cranial Nerves: No cranial nerve deficit  Sensory: No sensory deficit  Motor: No abnormal muscle tone  Coordination: Coordination normal       Deep Tendon Reflexes: Reflexes normal    Psychiatric:         Mood and Affect: Mood normal          Behavior: Behavior normal          Thought Content: Thought content normal          Judgment: Judgment normal          Lab Results:               Invalid input(s): LABGLOM      Lab Results   Component Value Date    HGBA1C 5 6 07/14/2022     No results found for: CKTOTAL, CKMB, CKMBINDEX, TROPONINI    Imaging Studies:     CT Chest 7/27/22: LUNGS:  Scattered punctate nodules most predominant in the mid lobe seen previously measuring 5 mm and 3 mm in image 78 and 75 unchanged  No new nodules  Lungs are otherwise clear  There is no tracheal or endobronchial lesion      HEART/GREAT VESSELS: Heart is unremarkable for patient's age  Ascending thoracic aortic aneurysmal dilatation measuring 4 5 cm  Aortic arch measures 4 0 centimeters proximal descending thoracic aorta measures 4 cm  No change      CT C/A/P 10/14/21: Fusiform aneurysmal enlargement of the ascending thoracic aorta, ascending aorta measuring 42 mm, anterior arch measuring 41 mm, posterior arch tapers to 38 mm  Indeterminate subcentimeter right middle lobe pulmonary nodules measuring 5 and 3 mm in greatest dimension  Based on current Fleischner Society 2017 Guidelines on incidental pulmonary nodule, patients with a known malignancy are at increased risk of   metastasis and if no prior examinations are available to demonstrate that these nodules are unchanged, the patient should receive initial three month follow-up noncontrast chest CT      Otherwise no CT findings to suggest recurrent or metastatic tumor in the chest, abdomen or pelvis      Cholelithiasis      Extensive atherosclerotic changes  Nondistended, featureless, and somewhat thick-walled appearance of a long segment of descending and sigmoid colon suggests the possibility of chronic non-gangrenous ischemic colitis either due to chronic watershed low   flow state or chronic inferior mesenteric artery atherosclerosis  Alternative etiologies of long segment chronic colitis are not excluded  Echocardiogram 7/19/22:   Left Ventricle Left ventricular cavity size is normal  Wall thickness is increased  There is mild to moderate concentric hypertrophy  The left ventricular ejection fraction is 60%  Systolic function is normal  Global longitudinal strain is normal at -19 5%  Wall motion is normal  Diastolic function is normal    Right Ventricle Right ventricular cavity size is normal  Systolic function is normal  Wall thickness is normal    Left Atrium The atrium is normal in size  Right Atrium The atrium is normal in size  Aortic Valve The aortic valve is trileaflet  The leaflets are mildly thickened  The leaflets are not calcified  The leaflets exhibit normal mobility  There is no evidence of regurgitation  The aortic valve has no significant stenosis     Mitral Valve The mitral valve has normal structure and function  There is no evidence of regurgitation  There is no evidence of stenosis  Tricuspid Valve Tricuspid valve structure is normal  There is no evidence of regurgitation  There is no evidence of stenosis  Pulmonic Valve Pulmonic valve structure is normal  There is no evidence of regurgitation  There is no evidence of stenosis  Ascending Aorta The aortic root is normal in size  IVC/SVC The inferior vena cava is normal in size  Pericardium There is no pericardial effusion  The pericardium is normal in appearance                 Left Ventricle Measurements    Dimensions   LVIDd 4 9 cm         LVIDS 2 6 cm         IVSd 1 6 cm         LVPWd 1 5 cm         FS 47          Diastolic Filling   MV E' Tissue Velocity Septal 9 cm/s         MV E' Tissue Velocity Lateral 9 cm/s         LA Volume Index (BP) 19 6          E/A ratio 1 29          E wave deceleration time 261 ms         MV Peak E Aldair 76 cm/s         MV Peak A [place] 0 59 m/s          Report Measurements   AV LVOT peak gradient 5 mmHg              Interventricular Septum Measurements    Shunt Ratio   LVOT peak VTI 26 69 cm         LVOT peak aldair 1 14 m/s              Right Ventricle Measurements    Dimensions   RVID d 3 7 cm               Left Atrium Measurements    Dimensions   LA size 4 7 cm         LA length (A2C) 6 3 cm         Volumes   LA Volume Index (BP) 19 6                Right Atrium Measurements    Dimensions   RA 2D Volume 74 mL         RAA A4C 23 5 cm2               Atrial Septum Measurements    Shunt Ratio   LVOT peak VTI 26 69 cm         LVOT peak aldair 1 14 m/s               Aortic Valve Measurements    Stenosis   Aortic valve peak velocity 1 55 m/s         LVOT peak aldair 1 14 m/s         Ao VTI 34 55 cm         LVOT peak VTI 26 69 cm         AV mean gradient 6 mmHg         LVOT mn grad 3 mmHg         AV peak gradient 10 mmHg         AV LVOT peak gradient 5 mmHg               Mitral Valve Measurements    Stenosis   MV stenosis pressure 1/2 time 76 ms         MV valve area p 1/2 method 2 9                Aorta Measurements    Aortic Dimensions   Ao root 3 5 cm                  I have personally reviewed pertinent reports  and I have personally reviewed pertinent films in PACS    Assessment:  Ascending aortic aneurysm; Ongoing ascending aortic aneurysm surveillance    Plan:    Kasey Laboy has an ascending aorta measuring 4 3cm in size at its greatest diameter, by Dr Pickens Self personal measurements  This is unchanged in size from his CT CAP on 10/14/21  As they are asymptomatic, with no family history follow-up monitoring is the treatment plan  Arrangements have been made for future surveillance to be completed with CT chest, without contrast in 1 Years  Kasey Laboy was comfortable with our recommendations, and their questions were answered to their satisfaction  Thank you for allowing us to participate in the care of this patient  Aortic Aneurysm Instructions were provided to the patient as follows:    1  No lifting more than 50 pounds  2  Maintain a controlled blood pressure with a goal of 120/80  3  Follow up in Aortic Clinic as recommended with radiology follow up as instructed  4  Report to the ER or call 911 immediately with the following signs / symptoms: sudden onset of back pain, chest pain or shortness of breath  The patient recently had a screening colonoscopy in 2021  Therefore GI referral is not indicated at this time  SIGNATURE: Gevena Schirmer, [place]  DATE: August 18, 2022  TIME: 9:42 AM    * This note was completed in part utilizing [employer] fluency direct voice recognition software  Grammatical errors, random word insertion, spelling mistakes, and incomplete sentences may be an occasional consequence of the system secondary to software limitations, ambient noise and hardware issues  At the time of dictation, efforts were made to edit, clarify and /or correct errors   Please read the chart carefully and recognize, using context, where substitutions have occurred  If you have any questions or concerns about the context, text or information contained within the body of this dictation, please contact myself, the provider, for further clarification  Attestation signed by Carol De Paz MD at 8/18/2022 11:01 AM:  Patient seen and evaluated with [place] / [facility]  I agree with the above assessment and plan with the following additions  The patient is a very pleasant 67-year-old man with an incidental finding of a ascending aorta aneurysm  I have personally review all the diagnostic data including CT of the chest on 10/14/2021 that shows an ascending aorta with a maximum diameter of 4 3 cm  CT chest 07/27/2022 ascending aorta maximum diameter 4 4 cm, aortic root 3 7 cm  TTE 07/19/2022 normal EF aortic valve is trileaflet without valvular disease  I explained the diagnosis to the patient, there is no indication for surgery at this point  I explained to him the importance of maintaining adequate blood pressure control, avoid smoking, avoid sustained straining  I will see him back in 1 year with a CT of the chest     This note was completed in part utilizing [employer] direct voice recognition software  Grammatical errors, random word insertion, spelling mistakes, and incomplete sentences may be an occasional consequence of the system secondary to software limitations, ambient noise and hardware issues  At the time of dictation, efforts were made to edit, clarify and /or correct errors  Please read the chart carefully and recognize, using context, where substitutions have occurred  If you have any questions or concerns about the context, text or information contained within the body of this dictation, please contact myself, the provider, for further clarification

## 2022-09-02 ENCOUNTER — TELEPHONE (OUTPATIENT)
Dept: VASCULAR SURGERY | Facility: CLINIC | Age: 72
End: 2022-09-02

## 2022-09-02 NOTE — TELEPHONE ENCOUNTER
Attempted to contact patient to schedule appointment(s) listed below  Requested patient call [phone] option 3 to schedule appointment(s)  Patient's appointment(s) are due now       Dopplers  [x] Abdominal Aorta Iliac (AOIL) done on 04/28/22  [] Carotid (CV)   [] Celiac and/or Mesenteric  [] Endovascular Aortic Repair (EVAR)   [] Hemodialysis Access (HD)   [] Lower Limb Arterial (ELISSA)  [] Lower Limb Venous Duplex with Reflux (LEVDR)  [] Renal Artery  [] Upper Limb Arterial (UEA)    [] Upper Limb Venous (UEV)    Advanced Imaging   [] CTA head/neck    [] CTA abdomen    [] CTA abdomen & pelvis    [] CT abdomen with/ without contrast  [] CT abdomen with contrast  [] CT abdomen without contrast    [] CT abdomen & pelvis with/ without contrast  [] CT abdomen & pelvis with contrast  [] CT abdomen & pelvis without contrast    Office Visit   [] New patient, patient last seen over 3 years ago  [x] Risk factor modification (RFM)   [] Follow up   [] Lost to follow up (LTFU)

## 2022-09-12 NOTE — TELEPHONE ENCOUNTER
----- Message from Kala Bowling sent at 12/13/2019 12:36 PM CST -----  Regarding: Other  Contact: [phone]  I use [facility] [address] in [place]    I just sent through e prescribe for eye drops     OV JRT 11/8/2022

## 2022-10-17 DIAGNOSIS — I10 ESSENTIAL HYPERTENSION: ICD-10-CM

## 2022-10-17 RX ORDER — IRBESARTAN 150 MG/1
TABLET ORAL
Qty: 90 TABLET | Refills: 1 | Status: SHIPPED | OUTPATIENT
Start: 2022-10-17

## 2022-10-17 NOTE — TELEPHONE ENCOUNTER
Last OV with Office: 7/20/2022   Last visit with PCP : 7/20/2022      Next visit with the Office :11/17/2022   Next visit with PCP : 11/17/2022    Labs pending

## 2022-10-21 ENCOUNTER — APPOINTMENT (OUTPATIENT)
Dept: LAB | Facility: CLINIC | Age: 72
End: 2022-10-21
Payer: COMMERCIAL

## 2022-10-21 DIAGNOSIS — Z12.5 PROSTATE CANCER SCREENING: ICD-10-CM

## 2022-10-21 LAB — PSA SERPL-MCNC: 0.3 NG/ML (ref 0–4)

## 2022-10-21 PROCEDURE — G0103 PSA SCREENING: HCPCS

## 2022-10-21 PROCEDURE — 36415 COLL VENOUS BLD VENIPUNCTURE: CPT

## 2022-10-26 ENCOUNTER — OFFICE VISIT (OUTPATIENT)
Dept: UROLOGY | Facility: MEDICAL CENTER | Age: 72
End: 2022-10-26

## 2022-10-26 VITALS
HEIGHT: 77 IN | BODY MASS INDEX: 27.87 KG/M2 | WEIGHT: 236 LBS | SYSTOLIC BLOOD PRESSURE: 118 MMHG | DIASTOLIC BLOOD PRESSURE: 70 MMHG | HEART RATE: 72 BPM

## 2022-10-26 DIAGNOSIS — Z12.5 PROSTATE CANCER SCREENING: ICD-10-CM

## 2022-10-26 DIAGNOSIS — R35.1 BENIGN PROSTATIC HYPERPLASIA WITH NOCTURIA: Primary | ICD-10-CM

## 2022-10-26 DIAGNOSIS — N40.1 BENIGN PROSTATIC HYPERPLASIA WITH NOCTURIA: Primary | ICD-10-CM

## 2022-10-26 LAB — POST-VOID RESIDUAL VOLUME, ML POC: 16 ML

## 2022-10-26 NOTE — PROGRESS NOTES
10/26/2022      Chief Complaint   Patient presents with   • Benign Prostatic Hypertrophy   • Renal Cancer         Assessment and Plan    66 y o  male managed by Dr Jaimie Young     1  BPH with nocturia   · Unable to provide urine sample today  · UA from 7/14/22 negative for infection or blood  · PVR: 16 mL   · AUA score: 25  · Patient previously discontinued Flomax due to reported ineffectiveness  He is bothered by his nocturia  Offered cystoscopy and possible TRUS for BPH/surgical work up  Also discussed surgical options including TURP and GLL  · Patient defers workup at this time  He will further discuss with his wife at home if he is interested will call to schedule  · Reviewed bladder irritants  · Encourage daily water intake 40 60 oz in to wean himself off fluids after dinner time  · Follow-up in 1 year      2  Prostate cancer screening  · PSA: 0 3 (10/21/22)   ? Previous PSA: 0 3 (10/21/21), 0 2 (7/15/2020), 0 2 (3/26/19), 0 3 (3/27/18)  · LILY today revealed smooth,minimally enlarged prostate without appreciable nodule  · Follow-up in 1 year with PSA      3  Personal history of kidney cancer  · S/p right radical nephrectomy in 2006 for a T3 renal cell carcinoma  · Patient has done well without evidence of metastatic disease  · CT scan on 10/14/21 was WNL  · CMP from 7/19/22 WNL for patient's baseline  · Further studies not currently indicated as per NCCN guidelines         History of Present Illness  Trinity Calles is a 66 y o  male here for evaluation of BPH, prostate cancer screening, and personal history of kidney cancer  Patient was previously managed on Flomax for his BPH but discontinued this medication due to minimal improvement in his urinary symptoms  He is mostly bothered by his nocturia 3 times per night  He denies issues with PRAMOD or other sleep disturbance issues  He states his daytime urinary symptoms are not bothersome    He denies issues with frequency and only occasionally experiences urinary urgency  Fortunately, he has not had any issues with urge urinary incontinence  He does note intermittent sensation of incomplete bladder emptying but denies issues with weak stream   Patient also has history of RCC and is s/p right radical nephrectomy in 2006 for T3 disease  He did not have any signs of metastasis or recurrence on any previous surveillance imaging  Most recent CT from October 2021 was unremarkable  Most recent CMP from July of 2022 within normal limits for patient's baseline  He denies any episodes of flank pain or gross hematuria  He has to work out 3 times a week and has been feeling well  Review of Systems   Constitutional: Negative for chills and fever  Respiratory: Negative  Gastrointestinal: Negative for abdominal pain, nausea and vomiting  Genitourinary: Positive for difficulty urinating (sometimes) and urgency (occasional, reports good control without leakage)  Negative for enuresis, frequency and hematuria  Nocturia 3x per night, stable for years  Does report sensation of incomplete bladder emptying  Denies weak stream    Musculoskeletal: Negative  Skin: Negative  Neurological: Negative  AUA SYMPTOM SCORE    Flowsheet Row Most Recent Value   AUA SYMPTOM SCORE    How often have you had a sensation of not emptying your bladder completely after you finished urinating? 5   How often have you had to urinate again less than two hours after you finished urinating? 5   How often have you found you stopped and started again several times when you urinate? 2   How often have you found it difficult to postpone urination? 2   How often have you had a weak urinary stream? 4   How often have you had to push or strain to begin urination? 4   How many times did you most typically get up to urinate from the time you went to bed at night until the time you got up in the morning?  3   Quality of Life: If you were to spend the rest of your life with your urinary condition just the way it is now, how would you feel about that? 4   AUA SYMPTOM SCORE 25           Vitals  Vitals:    10/26/22 1358   BP: 118/70   Pulse: 72   Weight: 107 kg (236 lb)   Height: 6' 5" (1 956 m)       Physical Exam  Vitals reviewed  Constitutional:       General: He is not in acute distress  Appearance: Normal appearance  He is not ill-appearing, toxic-appearing or diaphoretic  HENT:      Head: Normocephalic and atraumatic  Eyes:      Conjunctiva/sclera: Conjunctivae normal    Pulmonary:      Effort: Pulmonary effort is normal  No respiratory distress  Abdominal:      General: There is no distension  Genitourinary:     Comments: LILY today revealed smooth,minimally enlarged prostate without appreciable nodule  Musculoskeletal:         General: Normal range of motion  Cervical back: Normal range of motion  Skin:     General: Skin is warm and dry  Neurological:      General: No focal deficit present  Mental Status: He is alert and oriented to person, place, and time  Psychiatric:         Mood and Affect: Mood normal          Behavior: Behavior normal          Thought Content:  Thought content normal          Judgment: Judgment normal        Past History  Past Medical History:   Diagnosis Date   • Arthritis    • Cancer ([address] )     right kidney    • Cellulitis of left leg     June 2016   • Chronic kidney disease    • Exercises 3 to 4 times per week    • Full dentures    • History of colonoscopy 2021   • Hypertension    • Inguinal hernia, right     last assessed: 7/6/2016   • Malignant neoplasm of kidney ([address] )     unspecified laterality Managed by Selin Rodriguez MD Last assessed: 3/1/2016   • Renal cell carcinoma ([address] )    • Seasonal allergies    • Thrombosis     superficial left leg June 2016   • Varicose veins of both lower extremities      Social History     Socioeconomic History   • Marital status: /Civil Union     Spouse name: None   • Number of children: None   • Years of education: None   • Highest education level: None   Occupational History   • None   Tobacco Use   • Smoking status: Former Smoker     Types: Cigarettes     Quit date: 2006     Years since quittin 2   • Smokeless tobacco: Never Used   Vaping Use   • Vaping Use: Never used   Substance and Sexual Activity   • Alcohol use: Never   • Drug use: No   • Sexual activity: None   Other Topics Concern   • None   Social History Narrative   • None     Social Determinants of Health     Financial Resource Strain: Not on file   Food Insecurity: Not on file   Transportation Needs: Not on file   Physical Activity: Not on file   Stress: Not on file   Social Connections: Not on file   Intimate Partner Violence: Not on file   Housing Stability: Not on file     Social History     Tobacco Use   Smoking Status Former Smoker   • Types: Cigarettes   • Quit date: 2006   • Years since quittin 2   Smokeless Tobacco Never Used     Family History   Problem Relation Age of Onset   • Heart attack Mother    • Cancer Father        The following portions of the patient's history were reviewed and updated as appropriate: allergies, current medications, past medical history, past social history, past surgical history and problem list     Results  Recent Results (from the past 1 hour(s))   POCT Measure PVR    Collection Time: 10/26/22  2:06 PM   Result Value Ref Range    POST-VOID RESIDUAL VOLUME, ML POC 16 mL   ]  Lab Results   Component Value Date    PSA 0 3 10/21/2022    PSA 0 3 10/21/2021    PSA 0 2 07/15/2020    PSA 0 2 2019     Lab Results   Component Value Date    GLUCOSE 111 2014    CALCIUM 9 0 2022     2014    K 3 9 2022    CO2 27 2022     2022    BUN 21 2022    CREATININE 1 30 2022     Lab Results   Component Value Date    WBC 11 33 (H) 2022    HGB 12 9 2022    HCT 38 3 2022    MCV 91 2022     2022 João Myles PA-C

## 2022-11-10 ENCOUNTER — APPOINTMENT (OUTPATIENT)
Dept: LAB | Facility: CLINIC | Age: 72
End: 2022-11-10

## 2022-11-10 DIAGNOSIS — N18.31 STAGE 3A CHRONIC KIDNEY DISEASE (HCC): ICD-10-CM

## 2022-11-10 DIAGNOSIS — E78.2 MIXED HYPERLIPIDEMIA: ICD-10-CM

## 2022-11-10 DIAGNOSIS — I48.91 NEW ONSET ATRIAL FIBRILLATION (HCC): ICD-10-CM

## 2022-11-10 DIAGNOSIS — R91.8 PULMONARY NODULES: ICD-10-CM

## 2022-11-10 DIAGNOSIS — Z79.01 ANTICOAGULATED BY ANTICOAGULATION TREATMENT: ICD-10-CM

## 2022-11-10 DIAGNOSIS — I71.40 ABDOMINAL AORTIC ANEURYSM (AAA) WITHOUT RUPTURE: ICD-10-CM

## 2022-11-10 DIAGNOSIS — E03.9 ACQUIRED HYPOTHYROIDISM: ICD-10-CM

## 2022-11-10 DIAGNOSIS — E80.4 GILBERT'S SYNDROME: ICD-10-CM

## 2022-11-10 DIAGNOSIS — C64.1 MALIGNANT NEOPLASM OF RIGHT KIDNEY, EXCEPT RENAL PELVIS (HCC): ICD-10-CM

## 2022-11-10 DIAGNOSIS — R73.9 HYPERGLYCEMIA: ICD-10-CM

## 2022-11-10 DIAGNOSIS — I70.0 ABDOMINAL AORTIC ATHEROSCLEROSIS (HCC): ICD-10-CM

## 2022-11-10 DIAGNOSIS — I10 ESSENTIAL HYPERTENSION: ICD-10-CM

## 2022-11-10 DIAGNOSIS — I48.91 NEW ONSET A-FIB (HCC): ICD-10-CM

## 2022-11-10 LAB
ALBUMIN SERPL BCP-MCNC: 3.9 G/DL (ref 3.5–5)
ALP SERPL-CCNC: 74 U/L (ref 46–116)
ALT SERPL W P-5'-P-CCNC: 33 U/L (ref 12–78)
ANION GAP SERPL CALCULATED.3IONS-SCNC: 2 MMOL/L (ref 4–13)
AST SERPL W P-5'-P-CCNC: 19 U/L (ref 5–45)
BILIRUB SERPL-MCNC: 1.32 MG/DL (ref 0.2–1)
BUN SERPL-MCNC: 22 MG/DL (ref 5–25)
CALCIUM SERPL-MCNC: 9.9 MG/DL (ref 8.3–10.1)
CHLORIDE SERPL-SCNC: 109 MMOL/L (ref 96–108)
CHOLEST SERPL-MCNC: 105 MG/DL
CO2 SERPL-SCNC: 29 MMOL/L (ref 21–32)
CREAT SERPL-MCNC: 1.37 MG/DL (ref 0.6–1.3)
EST. AVERAGE GLUCOSE BLD GHB EST-MCNC: 117 MG/DL
GFR SERPL CREATININE-BSD FRML MDRD: 51 ML/MIN/1.73SQ M
GLUCOSE P FAST SERPL-MCNC: 120 MG/DL (ref 65–99)
HBA1C MFR BLD: 5.7 %
HDLC SERPL-MCNC: 44 MG/DL
LDLC SERPL CALC-MCNC: 39 MG/DL (ref 0–100)
POTASSIUM SERPL-SCNC: 4.3 MMOL/L (ref 3.5–5.3)
PROT SERPL-MCNC: 6.7 G/DL (ref 6.4–8.4)
SODIUM SERPL-SCNC: 140 MMOL/L (ref 135–147)
TRIGL SERPL-MCNC: 108 MG/DL
TSH SERPL DL<=0.05 MIU/L-ACNC: 3.64 UIU/ML (ref 0.45–4.5)

## 2022-11-17 ENCOUNTER — OFFICE VISIT (OUTPATIENT)
Dept: FAMILY MEDICINE CLINIC | Facility: CLINIC | Age: 72
End: 2022-11-17

## 2022-11-17 VITALS
DIASTOLIC BLOOD PRESSURE: 74 MMHG | HEART RATE: 60 BPM | SYSTOLIC BLOOD PRESSURE: 122 MMHG | WEIGHT: 236 LBS | HEIGHT: 77 IN | BODY MASS INDEX: 27.87 KG/M2

## 2022-11-17 DIAGNOSIS — R35.1 BENIGN PROSTATIC HYPERPLASIA WITH NOCTURIA: ICD-10-CM

## 2022-11-17 DIAGNOSIS — E03.9 ACQUIRED HYPOTHYROIDISM: ICD-10-CM

## 2022-11-17 DIAGNOSIS — E78.2 MIXED HYPERLIPIDEMIA: ICD-10-CM

## 2022-11-17 DIAGNOSIS — Z00.00 HEALTH CARE MAINTENANCE: ICD-10-CM

## 2022-11-17 DIAGNOSIS — Z79.01 ANTICOAGULATED BY ANTICOAGULATION TREATMENT: ICD-10-CM

## 2022-11-17 DIAGNOSIS — I71.20 THORACIC AORTIC ANEURYSM WITHOUT RUPTURE, UNSPECIFIED PART: ICD-10-CM

## 2022-11-17 DIAGNOSIS — N18.31 STAGE 3A CHRONIC KIDNEY DISEASE (HCC): ICD-10-CM

## 2022-11-17 DIAGNOSIS — I70.0 ABDOMINAL AORTIC ATHEROSCLEROSIS (HCC): ICD-10-CM

## 2022-11-17 DIAGNOSIS — I48.91 NEW ONSET A-FIB (HCC): ICD-10-CM

## 2022-11-17 DIAGNOSIS — K63.5 POLYP OF COLON, UNSPECIFIED PART OF COLON, UNSPECIFIED TYPE: Primary | ICD-10-CM

## 2022-11-17 DIAGNOSIS — R73.9 HYPERGLYCEMIA: ICD-10-CM

## 2022-11-17 DIAGNOSIS — Z85.528 PERSONAL HISTORY OF KIDNEY CANCER: ICD-10-CM

## 2022-11-17 DIAGNOSIS — I10 ESSENTIAL HYPERTENSION: ICD-10-CM

## 2022-11-17 DIAGNOSIS — N40.1 BENIGN PROSTATIC HYPERPLASIA WITH NOCTURIA: ICD-10-CM

## 2022-11-17 DIAGNOSIS — I71.40 ABDOMINAL AORTIC ANEURYSM (AAA) WITHOUT RUPTURE, UNSPECIFIED PART: ICD-10-CM

## 2022-11-17 PROBLEM — L97.912 NON-PRESSURE CHRONIC ULCER OF RIGHT LOWER LEG WITH FAT LAYER EXPOSED (HCC): Status: RESOLVED | Noted: 2022-11-17 | Resolved: 2022-11-17

## 2022-11-17 PROBLEM — R79.89 ELEVATED D-DIMER: Status: RESOLVED | Noted: 2022-07-18 | Resolved: 2022-11-17

## 2022-11-17 PROBLEM — K52.9 COLITIS: Status: RESOLVED | Noted: 2021-10-20 | Resolved: 2022-11-17

## 2022-11-17 PROBLEM — L97.912 NON-PRESSURE CHRONIC ULCER OF RIGHT LOWER LEG WITH FAT LAYER EXPOSED (HCC): Status: ACTIVE | Noted: 2022-11-17

## 2022-11-17 NOTE — PATIENT INSTRUCTIONS
Continue present therapy   Follow all specialist per their instructions  Return in 6 months for office visit blood work sooner if needed   Continue diet exercise weight loss  Fall Prevention   AMBULATORY CARE:   Fall prevention  includes ways to make your home and other areas safer  It also includes ways you can move more carefully to prevent a fall  Health conditions that cause changes in your blood pressure, vision, or muscle strength and coordination may increase your risk for falls  Medicines may also increase your risk for falls if they make you dizzy, weak, or sleepy  Call 911 or have someone else call if:   You have fallen and are unconscious  You have fallen and cannot move part of your body  Contact your healthcare provider if:   You have fallen and have pain or a headache  You have questions or concerns about your condition or care  Fall prevention tips:   Stand or sit up slowly  This may help you keep your balance and prevent falls  Use assistive devices as directed  Your healthcare provider may suggest that you use a cane or walker to help you keep your balance  You may need to have grab bars put in your bathroom near the toilet or in the shower  Wear shoes that fit well and have soles that   Wear shoes both inside and outside  Use slippers with good   Do not wear shoes with high heels  Wear a personal alarm  This is a device that allows you to call 911 if you fall and need help  Ask your healthcare provider for more information  Stay active  Exercise can help strengthen your muscles and improve your balance  Your healthcare provider may recommend water aerobics or walking  He or she may also recommend physical therapy to improve your coordination  Never start an exercise program without talking to your healthcare provider first          Manage your medical conditions  Keep all appointments with your healthcare providers  Visit your eye doctor as directed      Home safety tips: Add items to prevent falls in the bathroom  Put nonslip strips on your bath or shower floor to prevent you from slipping  Use a bath mat if you do not have carpet in the bathroom  This will prevent you from falling when you step out of the bath or shower  Use a shower seat so you do not need to stand while you shower  Sit on the toilet or a chair in your bathroom to dry yourself and put on clothing  This will prevent you from losing your balance from drying or dressing yourself while you are standing  Keep paths clear  Remove books, shoes, and other objects from walkways and stairs  Place cords for telephones and lamps out of the way so that you do not need to walk over them  Tape them down if you cannot move them  Remove small rugs  If you cannot remove a rug, secure it with double-sided tape  This will prevent you from tripping  Install bright lights in your home  Use night lights to help light paths to the bathroom or kitchen  Always turn on the light before you start walking  Keep items you use often on shelves within reach  Do not use a step stool to help you reach an item  Paint or place reflective tape on the edges of your stairs  This will help you see the stairs better  Follow up with your doctor as directed:  Write down your questions so you remember to ask them during your visits  © Copyright [employer] 2022 Information is for End User's use only and may not be sold, redistributed or otherwise used for commercial purposes  All illustrations and images included in CareNotes® are the copyrighted property of [employer]  or [employer]  The above information is an  only  It is not intended as medical advice for individual conditions or treatments  Talk to your doctor, nurse or pharmacist before following any medical regimen to see if it is safe and effective for you

## 2022-11-17 NOTE — PROGRESS NOTES
- Assessment and Plan:     1  Colon polyp up-to-date with colonoscopy  2  Hypothyroidism, stable off medication continue monitor  3  Abdominal aortic aneurysm, stable follows with vascular surgery   4  Abdominal atherosclerosis, continue statin/Xarelto follows with vascular surgery  5  Thoracic aneurysm, stable follows with CT surgery  6  AFib/anticoagulated, stable follows with Cardiology  7  CKD-3, stable on Arb follows with Nephrology  8  Healthcare maintenance, Medicare a [address] completed  9  Hyperlipidemia, stable continue present therapy  10  History kidney cancer status post nephrectomy doing well follows with Urology  11  BPH, stable PSA normal follows with Urology  12  Patient return in 6 months for office visit and blood work sooner if needed        Problem List Items Addressed This Visit        Digestive    Colon polyp - Primary    Relevant Orders    CBC    Comprehensive metabolic panel    Hemoglobin A1C    Lipid Panel with Direct LDL reflex    TSH, 3rd generation with Free T4 reflex    UA (URINE) with reflex to Scope       Endocrine    Acquired hypothyroidism     Stable off medication continue to monitor         Relevant Orders    CBC    Comprehensive metabolic panel    Hemoglobin A1C    Lipid Panel with Direct LDL reflex    TSH, 3rd generation with Free T4 reflex    UA (URINE) with reflex to Scope       Cardiovascular and Mediastinum    Essential hypertension     Stable continue present therapy         Relevant Orders    CBC    Comprehensive metabolic panel    Hemoglobin A1C    Lipid Panel with Direct LDL reflex    TSH, 3rd generation with Free T4 reflex    UA (URINE) with reflex to Scope    Abdominal aortic aneurysm (AAA) without rupture     Stable follows with vascular surgery         Relevant Orders    CBC    Comprehensive metabolic panel    Hemoglobin A1C    Lipid Panel with Direct LDL reflex    TSH, 3rd generation with Free T4 reflex    UA (URINE) with reflex to Scope    Abdominal aortic atherosclerosis [facility])     On Xarelto/statin therapy         Relevant Orders    CBC    Comprehensive metabolic panel    Hemoglobin A1C    Lipid Panel with Direct LDL reflex    TSH, 3rd generation with Free T4 reflex    UA (URINE) with reflex to Scope    New onset a-fib [facility])     Follows with Cardiology stable         Relevant Orders    CBC    Comprehensive metabolic panel    Hemoglobin A1C    Lipid Panel with Direct LDL reflex    TSH, 3rd generation with Free T4 reflex    UA (URINE) with reflex to Scope    Thoracic aortic aneurysm without rupture     Stable follows with CT surgery         Relevant Orders    CBC    Comprehensive metabolic panel    Hemoglobin A1C    Lipid Panel with Direct LDL reflex    TSH, 3rd generation with Free T4 reflex    UA (URINE) with reflex to Scope       Genitourinary    Stage 3 chronic kidney disease ([facility] [address] )     Lab Results   Component Value Date    EGFR 51 11/10/2022    EGFR 54 07/19/2022    EGFR 50 07/18/2022    CREATININE 1 37 (H) 11/10/2022    CREATININE 1 30 07/19/2022    CREATININE 1 39 (H) 07/18/2022   Follow-up with Nephrology on Arb, stable         Relevant Orders    CBC    Comprehensive metabolic panel    Hemoglobin A1C    Lipid Panel with Direct LDL reflex    TSH, 3rd generation with Free T4 reflex    UA (URINE) with reflex to Scope       Other    Benign prostatic hyperplasia with nocturia     PSA normal follows with Urology         Hyperglycemia     Diet control         Relevant Orders    CBC    Comprehensive metabolic panel    Hemoglobin A1C    Lipid Panel with Direct LDL reflex    TSH, 3rd generation with Free T4 reflex    UA (URINE) with reflex to Scope    Mixed hyperlipidemia     Stable continue atorvastatin 40 mg daily         Relevant Orders    CBC    Comprehensive metabolic panel    Hemoglobin A1C    Lipid Panel with Direct LDL reflex    TSH, 3rd generation with Free T4 reflex    UA (URINE) with reflex to Scope    Health care maintenance     Medicare a [address] completed         Relevant Orders CBC    Comprehensive metabolic panel    Hemoglobin A1C    Lipid Panel with Direct LDL reflex    TSH, 3rd generation with Free T4 reflex    UA (URINE) with reflex to Scope    Personal history of kidney cancer    Relevant Orders    CBC    Comprehensive metabolic panel    Hemoglobin A1C    Lipid Panel with Direct LDL reflex    TSH, 3rd generation with Free T4 reflex    UA (URINE) with reflex to Scope    Anticoagulated by anticoagulation treatment     Continue Xarelto         Relevant Orders    CBC    Comprehensive metabolic panel    Hemoglobin A1C    Lipid Panel with Direct LDL reflex    TSH, 3rd generation with Free T4 reflex    UA (URINE) with reflex to Scope       Depression Screening and Follow-up Plan: Patient was screened for depression during today's encounter  They screened negative with a PHQ-2 score of 0  Preventive health issues were discussed with patient, and age appropriate screening tests were ordered as noted in patient's After Visit Summary  Personalized health advice and appropriate referrals for health education or preventive services given if needed, as noted in patient's After Visit Summary  History of Present Illness:     Patient presents for a Medicare Wellness Visit    Patient doing well without any medical complaints concerns the present time  Blood work was discussed patient did lose 6 lb since last office visit     Patient Care Team:  Jennifer Benitez DO as PCP - General (Family Medicine)  Cisco Brannon PA-C as Physician Assistant (Vascular Surgery)  Jennifer Poole MD (Vascular Surgery)     Review of Systems:     Review of Systems   Constitutional: Negative  HENT: Negative  Eyes: Negative  Respiratory: Negative  Cardiovascular: Negative  Gastrointestinal: Negative  Endocrine: Negative  Genitourinary: Negative  Musculoskeletal: Negative  Skin: Negative  Allergic/Immunologic: Negative  Neurological: Negative  Hematological: Negative  Psychiatric/Behavioral: Negative           Problem List:     Patient Active Problem List   Diagnosis   • Inguinal hernia unilateral, non-recurrent   • Allergic rhinitis   • Benign prostatic hyperplasia with nocturia   • Stage 3 chronic kidney disease ([facility] [address] )   • Colon polyp   • ED (erectile dysfunction)   • Gait disturbance   • Hyperglycemia   • Mixed hyperlipidemia   • Essential hypertension   • Left knee pain   • Osteoarthritis   • Health care maintenance   • Tinnitus   • Personal history of kidney cancer   • Gilbert's syndrome   • Acquired hypothyroidism   • Abdominal aortic aneurysm (AAA) without rupture   • Abdominal aortic atherosclerosis ([facility] [address] )   • New onset a-fib ([facility] [address] )   • Anticoagulated by anticoagulation treatment   • Thoracic aortic aneurysm without rupture      Past Medical and Surgical History:     Past Medical History:   Diagnosis Date   • Arthritis    • Cancer ([facility] [address] )     right kidney    • Cellulitis of left leg     June 2016   • Chronic kidney disease    • Exercises 3 to 4 times per week    • Full dentures    • History of colonoscopy 2021   • Hypertension    • Inguinal hernia, right     last assessed: 7/6/2016   • Malignant neoplasm of kidney ([facility] [address] )     unspecified laterality Managed by Mona Zamora MD Last assessed: 3/1/2016   • Renal cell carcinoma ([facility] [address] )    • Seasonal allergies    • Thrombosis     superficial left leg June 2016   • Varicose veins of both lower extremities      Past Surgical History:   Procedure Laterality Date   • APPENDECTOMY     • COLONOSCOPY     • CYSTOSCOPY      Diagnostic   • HERNIA REPAIR Right 7/21/2016    Procedure: REPAIR HERNIA INGUINAL, LAPAROSCOPIC;  Surgeon: Gracie Fernandez MD;  Location: [facility];  Service:    • HIP ARTHROPLASTY Left     twice 1999 and 2011   • HIP ARTHROPLASTY Right     1999   • LAPAROSCOPIC INGUINAL HERNIA REPAIR  07/21/2016    description: laparoscopic right inguinal hernia repair with mesh    • NEPHRECTOMY Right     due to a tumor   • ROTATOR CUFF REPAIR Left    • TOTAL HIP ARTHROPLASTY     • TOTAL HIP ARTHROPLASTY Bilateral       Family History:     Family History   Problem Relation Age of Onset   • Heart attack Mother    • Cancer Father       Social History:     Social History     Socioeconomic History   • Marital status: /Civil Union     Spouse name: None   • Number of children: None   • Years of education: None   • Highest education level: None   Occupational History   • None   Tobacco Use   • Smoking status: Former     Types: Cigarettes     Quit date: 2006     Years since quittin 3   • Smokeless tobacco: Never   Vaping Use   • Vaping Use: Never used   Substance and Sexual Activity   • Alcohol use: Never   • Drug use: No   • Sexual activity: None   Other Topics Concern   • None   Social History Narrative   • None     Social Determinants of Health     Financial Resource Strain: Low Risk    • Difficulty of Paying Living Expenses: Not hard at all   Food Insecurity: Not on file   Transportation Needs: No Transportation Needs   • Lack of Transportation (Medical): No   • Lack of Transportation (Non-Medical): No   Physical Activity: Not on file   Stress: Not on file   Social Connections: Not on file   Intimate Partner Violence: Not on file   Housing Stability: Not on file      Medications and Allergies:     Current Outpatient Medications   Medication Sig Dispense Refill   • atorvastatin (LIPITOR) 40 mg tablet Take 1 tablet (40 mg total) by mouth daily with dinner 90 tablet 3   • irbesartan (AVAPRO) 150 mg tablet TAKE 1 TABLET DAILY AT     BEDTIME 90 tablet 1   • Multiple Vitamin (multivitamin) capsule Take 1 capsule by mouth daily     • rivaroxaban (XARELTO) 20 mg tablet Take 1 tablet (20 mg total) by mouth daily with dinner 90 tablet 1     No current facility-administered medications for this visit       Allergies   Allergen Reactions   • Nsaids Other (See Comments)     Instructed to avoid all ibuprofen's/NSAIDS  as pt only has one kidney Immunizations:     Immunization History   Administered Date(s) Administered   • COVID-19 MODERNA VACC 0 5 ML IM 03/17/2021, 04/14/2021   • INFLUENZA 10/10/2019   • Influenza Split High Dose Preservative Free IM 11/24/2015, 11/08/2016, 09/20/2017   • Influenza, high dose seasonal 0 7 mL 10/05/2018, 10/08/2020, 10/01/2021   • Influenza, injectable, quadrivalent, preservative free 0 5 mL 10/01/2019   • Influenza, seasonal, injectable 12/19/2011, 12/31/2013   • Pneumococcal Conjugate 13-Valent 03/06/2017   • Pneumococcal Polysaccharide PPV23 12/11/2014   • Td (adult), adsorbed 01/01/2011   • Tdap 03/06/2017   • Zoster 02/26/2013      Health Maintenance:         Topic Date Due   • Colorectal Cancer Screening  11/16/2023   • Hepatitis C Screening  Completed         Topic Date Due   • Hepatitis B Vaccine (1 of 3 - 3-dose series) Never done   • COVID-19 Vaccine (3 - Booster for Moderna series) 09/14/2021   • Influenza Vaccine (1) 09/01/2022      Medicare Screening Tests and Risk Assessments:         Health Risk Assessment:   Patient rates overall health as good  Patient feels that their physical health rating is same  Patient is satisfied with their life  Eyesight was rated as same  Hearing was rated as same  Patient feels that their emotional and mental health rating is same  Patients states they are never, rarely angry  Patient states they are never, rarely unusually tired/fatigued  Pain experienced in the last 7 days has been none  Patient states that he has experienced no weight loss or gain in last 6 months  Depression Screening:   PHQ-2 Score: 0      Fall Risk Screening: In the past year, patient has experienced: no history of falling in past year      Home Safety:  Patient does not have trouble with stairs inside or outside of their home  Patient has working smoke alarms and has working carbon monoxide detector  Home safety hazards include: none  Nutrition:   Current diet is Regular       Medications: Patient is currently taking over-the-counter supplements  OTC medications include: see medication list  Patient is not able to manage medications  Activities of Daily Living (ADLs)/Instrumental Activities of Daily Living (IADLs):   Walk and transfer into and out of bed and chair?: Yes  Dress and groom yourself?: Yes    Bathe or shower yourself?: Yes    Feed yourself? Yes  Do your laundry/housekeeping?: Yes  Manage your money, pay your bills and track your expenses?: Yes  Make your own meals?: Yes    Do your own shopping?: Yes    Previous Hospitalizations:   Any hospitalizations or ED visits within the last 12 months?: Yes    How many hospitalizations have you had in the last year?: 1-2    Hospitalization Comments: A  Fib episode    Advance Care Planning:   Living will: Yes    Durable POA for healthcare:  Yes    Advanced directive: Yes    Advanced directive counseling given: Yes    Five wishes given: Yes    Patient declined ACP directive: No    End of Life Decisions reviewed with patient: Yes    Provider agrees with end of life decisions: Yes      Cognitive Screening:   Provider or family/friend/caregiver concerned regarding cognition?: No    PREVENTIVE SCREENINGS      Cardiovascular Screening:    General: Screening Not Indicated and History Lipid Disorder      Diabetes Screening:     General: Screening Current      Colorectal Cancer Screening:     General: Screening Current      Prostate Cancer Screening:    General: Screening Current      Osteoporosis Screening:    General: Screening Not Indicated      Abdominal Aortic Aneurysm (AAA) Screening:    Risk factors include: age between 73-69 yo and tobacco use        General: Screening Not Indicated and History AAA      Lung Cancer Screening:     General: Screening Not Indicated      Hepatitis C Screening:    General: Screening Current    Screening, Brief Intervention, and Referral to Treatment (SBIRT)    Screening  Typical number of drinks in a day: 0  Typical number of drinks in a week: 0  Interpretation: Low risk drinking behavior  Single Item Drug Screening:  How often have you used an illegal drug (including marijuana) or a prescription medication for non-medical reasons in the past year? never    Single Item Drug Screen Score: 0  Interpretation: Negative screen for possible drug use disorder    No results found  Physical Exam:     /74   Pulse 60   Ht 6' 5" (1 956 m)   Wt 107 kg (236 lb)   BMI 27 99 kg/m²     Physical Exam  Vitals and nursing note reviewed  Constitutional:       Appearance: Normal appearance  HENT:      Mouth/Throat:      Mouth: Mucous membranes are moist    Eyes:      General: No scleral icterus  Cardiovascular:      Rate and Rhythm: Normal rate and regular rhythm  Heart sounds: Normal heart sounds  Pulmonary:      Effort: Pulmonary effort is normal       Breath sounds: Normal breath sounds  Abdominal:      General: Bowel sounds are normal       Palpations: Abdomen is soft  Tenderness: There is no abdominal tenderness  Musculoskeletal:      Cervical back: Neck supple  Right lower leg: No edema  Left lower leg: No edema  Skin:     General: Skin is warm and dry  Neurological:      General: No focal deficit present  Mental Status: He is alert  Psychiatric:         Mood and Affect: Mood normal           Alex Maradiaga DO  Falls Plan of Care: Balance, strength, and gait training instructions were provided  BMI Counseling: Body mass index is 27 99 kg/m²  The BMI is above normal  Nutrition recommendations include moderation in carbohydrate intake and reducing intake of cholesterol

## 2022-11-17 NOTE — ASSESSMENT & PLAN NOTE
Lab Results   Component Value Date    EGFR 51 11/10/2022    EGFR 54 07/19/2022    EGFR 50 07/18/2022    CREATININE 1 37 (H) 11/10/2022    CREATININE 1 30 07/19/2022    CREATININE 1 39 (H) 07/18/2022   Follow-up with Nephrology on Arb, stable

## 2023-01-09 DIAGNOSIS — I48.91 NEW ONSET ATRIAL FIBRILLATION (HCC): ICD-10-CM

## 2023-05-24 ENCOUNTER — APPOINTMENT (OUTPATIENT)
Dept: LAB | Facility: CLINIC | Age: 73
End: 2023-05-24

## 2023-05-24 DIAGNOSIS — I71.20 THORACIC AORTIC ANEURYSM WITHOUT RUPTURE, UNSPECIFIED PART (HCC): ICD-10-CM

## 2023-05-24 DIAGNOSIS — K63.5 POLYP OF COLON, UNSPECIFIED PART OF COLON, UNSPECIFIED TYPE: ICD-10-CM

## 2023-05-24 DIAGNOSIS — I48.91 NEW ONSET A-FIB (HCC): ICD-10-CM

## 2023-05-24 DIAGNOSIS — I70.0 ABDOMINAL AORTIC ATHEROSCLEROSIS (HCC): ICD-10-CM

## 2023-05-24 DIAGNOSIS — Z00.00 HEALTH CARE MAINTENANCE: ICD-10-CM

## 2023-05-24 DIAGNOSIS — I71.40 ABDOMINAL AORTIC ANEURYSM (AAA) WITHOUT RUPTURE, UNSPECIFIED PART (HCC): ICD-10-CM

## 2023-05-24 DIAGNOSIS — R73.9 HYPERGLYCEMIA: ICD-10-CM

## 2023-05-24 DIAGNOSIS — E78.2 MIXED HYPERLIPIDEMIA: ICD-10-CM

## 2023-05-24 DIAGNOSIS — N18.31 STAGE 3A CHRONIC KIDNEY DISEASE (HCC): ICD-10-CM

## 2023-05-24 DIAGNOSIS — E03.9 ACQUIRED HYPOTHYROIDISM: ICD-10-CM

## 2023-05-24 DIAGNOSIS — Z79.01 ANTICOAGULATED BY ANTICOAGULATION TREATMENT: ICD-10-CM

## 2023-05-24 DIAGNOSIS — I10 ESSENTIAL HYPERTENSION: ICD-10-CM

## 2023-05-24 DIAGNOSIS — Z85.528 PERSONAL HISTORY OF KIDNEY CANCER: ICD-10-CM

## 2023-05-24 LAB
ALBUMIN SERPL BCP-MCNC: 3.8 G/DL (ref 3.5–5)
ALP SERPL-CCNC: 70 U/L (ref 46–116)
ALT SERPL W P-5'-P-CCNC: 30 U/L (ref 12–78)
ANION GAP SERPL CALCULATED.3IONS-SCNC: 0 MMOL/L (ref 4–13)
AST SERPL W P-5'-P-CCNC: 19 U/L (ref 5–45)
BILIRUB SERPL-MCNC: 1.03 MG/DL (ref 0.2–1)
BILIRUB UR QL STRIP: NEGATIVE
BUN SERPL-MCNC: 24 MG/DL (ref 5–25)
CALCIUM SERPL-MCNC: 9.7 MG/DL (ref 8.3–10.1)
CHLORIDE SERPL-SCNC: 109 MMOL/L (ref 96–108)
CHOLEST SERPL-MCNC: 115 MG/DL
CLARITY UR: CLEAR
CO2 SERPL-SCNC: 29 MMOL/L (ref 21–32)
COLOR UR: NORMAL
CREAT SERPL-MCNC: 1.46 MG/DL (ref 0.6–1.3)
ERYTHROCYTE [DISTWIDTH] IN BLOOD BY AUTOMATED COUNT: 11.8 % (ref 11.6–15.1)
EST. AVERAGE GLUCOSE BLD GHB EST-MCNC: 114 MG/DL
GFR SERPL CREATININE-BSD FRML MDRD: 47 ML/MIN/1.73SQ M
GLUCOSE P FAST SERPL-MCNC: 110 MG/DL (ref 65–99)
GLUCOSE UR STRIP-MCNC: NEGATIVE MG/DL
HBA1C MFR BLD: 5.6 %
HCT VFR BLD AUTO: 44.4 % (ref 36.5–49.3)
HDLC SERPL-MCNC: 52 MG/DL
HGB BLD-MCNC: 14 G/DL (ref 12–17)
HGB UR QL STRIP.AUTO: NEGATIVE
KETONES UR STRIP-MCNC: NEGATIVE MG/DL
LDLC SERPL CALC-MCNC: 47 MG/DL (ref 0–100)
LEUKOCYTE ESTERASE UR QL STRIP: NEGATIVE
MCH RBC QN AUTO: 30.6 PG (ref 26.8–34.3)
MCHC RBC AUTO-ENTMCNC: 31.5 G/DL (ref 31.4–37.4)
MCV RBC AUTO: 97 FL (ref 82–98)
NITRITE UR QL STRIP: NEGATIVE
PH UR STRIP.AUTO: 6.5 [PH]
PLATELET # BLD AUTO: 308 THOUSANDS/UL (ref 149–390)
PMV BLD AUTO: 9.8 FL (ref 8.9–12.7)
POTASSIUM SERPL-SCNC: 4.2 MMOL/L (ref 3.5–5.3)
PROT SERPL-MCNC: 6.9 G/DL (ref 6.4–8.4)
PROT UR STRIP-MCNC: NEGATIVE MG/DL
RBC # BLD AUTO: 4.57 MILLION/UL (ref 3.88–5.62)
SODIUM SERPL-SCNC: 138 MMOL/L (ref 135–147)
SP GR UR STRIP.AUTO: 1.02 (ref 1–1.03)
TRIGL SERPL-MCNC: 79 MG/DL
TSH SERPL DL<=0.05 MIU/L-ACNC: 4.25 UIU/ML (ref 0.45–4.5)
UROBILINOGEN UR STRIP-ACNC: <2 MG/DL
WBC # BLD AUTO: 9.09 THOUSAND/UL (ref 4.31–10.16)

## 2023-05-30 ENCOUNTER — OFFICE VISIT (OUTPATIENT)
Dept: FAMILY MEDICINE CLINIC | Facility: CLINIC | Age: 73
End: 2023-05-30

## 2023-05-30 VITALS
SYSTOLIC BLOOD PRESSURE: 132 MMHG | OXYGEN SATURATION: 97 % | WEIGHT: 245 LBS | HEIGHT: 77 IN | HEART RATE: 56 BPM | RESPIRATION RATE: 20 BRPM | DIASTOLIC BLOOD PRESSURE: 81 MMHG | BODY MASS INDEX: 28.93 KG/M2

## 2023-05-30 DIAGNOSIS — K63.5 POLYP OF COLON, UNSPECIFIED PART OF COLON, UNSPECIFIED TYPE: ICD-10-CM

## 2023-05-30 DIAGNOSIS — E78.2 MIXED HYPERLIPIDEMIA: ICD-10-CM

## 2023-05-30 DIAGNOSIS — E03.9 ACQUIRED HYPOTHYROIDISM: Primary | ICD-10-CM

## 2023-05-30 DIAGNOSIS — I10 ESSENTIAL HYPERTENSION: ICD-10-CM

## 2023-05-30 DIAGNOSIS — I71.20 THORACIC AORTIC ANEURYSM WITHOUT RUPTURE, UNSPECIFIED PART (HCC): ICD-10-CM

## 2023-05-30 DIAGNOSIS — R35.1 BENIGN PROSTATIC HYPERPLASIA WITH NOCTURIA: ICD-10-CM

## 2023-05-30 DIAGNOSIS — Z79.01 ANTICOAGULATED BY ANTICOAGULATION TREATMENT: ICD-10-CM

## 2023-05-30 DIAGNOSIS — I71.40 ABDOMINAL AORTIC ANEURYSM (AAA) WITHOUT RUPTURE, UNSPECIFIED PART (HCC): ICD-10-CM

## 2023-05-30 DIAGNOSIS — N40.1 BENIGN PROSTATIC HYPERPLASIA WITH NOCTURIA: ICD-10-CM

## 2023-05-30 DIAGNOSIS — R73.9 HYPERGLYCEMIA: ICD-10-CM

## 2023-05-30 DIAGNOSIS — I70.0 ABDOMINAL AORTIC ATHEROSCLEROSIS (HCC): ICD-10-CM

## 2023-05-30 DIAGNOSIS — I48.91 NEW ONSET A-FIB (HCC): ICD-10-CM

## 2023-05-30 DIAGNOSIS — N18.31 STAGE 3A CHRONIC KIDNEY DISEASE (HCC): ICD-10-CM

## 2023-05-30 NOTE — PROGRESS NOTES
Name: Ever Florez      : 1950      MRN: [id number]  Encounter Provider: Smith Rivas DO  Encounter Date: 2023   Encounter department: [facility]    Assessment & Plan     1  Hypothyroidism, stable off medication continue to monitor  2  Colon polyp patient to follow with Dr Luz Lee  3  Thoracic aortic aneurysm, to follow with CT surgery CT chest order reprinted as ordered by thoracic surgery department  4  A-fib/anticoagulated, patient did not complete cardiology consultation reordered  5  Hypertension, stable continue present therapy #6  Abdominal aortic aneurysm, abdominal aortic atherosclerosis, stable follows with vascular surgery  7  Hyperlipidemia, stable continue present therapy #8  CKD-3, stable on ARB follows with nephrology status post right nephrectomy secondary to renal carcinoma  9  Upper glycemia diet controlled  10  BPH follows with urology  11  Patient to return in 6 months for office visit, blood work, and AWV      1  Acquired hypothyroidism  Assessment & Plan:  Stable off of medication we will monitor    Orders:  -     CBC; Future; Expected date: 2023  -     Comprehensive metabolic panel; Future; Expected date: 2023  -     Hemoglobin A1C; Future; Expected date: 2023  -     Lipid Panel with Direct LDL reflex; Future; Expected date: 2023  -     TSH, 3rd generation with Free T4 reflex; Future; Expected date: 2023  -     UA (URINE) with reflex to Scope; Future; Expected date: 2023  -     Vitamin D 25 hydroxy; Future; Expected date: 2023    2  Polyp of colon, unspecified part of colon, unspecified type  Assessment & Plan:  Patient will be due for colonoscopy  follows with Dr Luz Lee    Orders:  -     CBC; Future; Expected date: 2023  -     Comprehensive metabolic panel;  Future; Expected date: 2023  -     Hemoglobin A1C; Future; Expected date: 2023  -     Lipid Panel with Direct LDL reflex; Future; Expected date: 11/30/2023  -     TSH, 3rd generation with Free T4 reflex; Future; Expected date: 11/30/2023  -     UA (URINE) with reflex to Scope; Future; Expected date: 11/30/2023  -     Vitamin D 25 hydroxy; Future; Expected date: 11/30/2023    3  Thoracic aortic aneurysm without rupture, unspecified part [facility])  Assessment & Plan:  Reynaldo Rey follows with CT surgery, CT ordered from them reprinted for the patient to complete    Orders:  -     CBC; Future; Expected date: 11/30/2023  -     Comprehensive metabolic panel; Future; Expected date: 11/30/2023  -     Hemoglobin A1C; Future; Expected date: 11/30/2023  -     Lipid Panel with Direct LDL reflex; Future; Expected date: 11/30/2023  -     TSH, 3rd generation with Free T4 reflex; Future; Expected date: 11/30/2023  -     UA (URINE) with reflex to Scope; Future; Expected date: 11/30/2023  -     Vitamin D 25 hydroxy; Future; Expected date: 11/30/2023    4  New onset a-fib [facility])  Assessment & Plan:  Stable, refer back to [facility] cardiology    Orders:  -     Ambulatory Referral to Cardiology; Future  -     CBC; Future; Expected date: 11/30/2023  -     Comprehensive metabolic panel; Future; Expected date: 11/30/2023  -     Hemoglobin A1C; Future; Expected date: 11/30/2023  -     Lipid Panel with Direct LDL reflex; Future; Expected date: 11/30/2023  -     TSH, 3rd generation with Free T4 reflex; Future; Expected date: 11/30/2023  -     UA (URINE) with reflex to Scope; Future; Expected date: 11/30/2023  -     Vitamin D 25 hydroxy; Future; Expected date: 11/30/2023    5  Essential hypertension  Assessment & Plan:  Stable continue present therapy    Orders:  -     CBC; Future; Expected date: 11/30/2023  -     Comprehensive metabolic panel; Future; Expected date: 11/30/2023  -     Hemoglobin A1C; Future; Expected date: 11/30/2023  -     Lipid Panel with Direct LDL reflex; Future; Expected date: 11/30/2023  -     TSH, 3rd generation with Free T4 reflex;  Future; Expected date: 11/30/2023  -     UA (URINE) with reflex to Scope; Future; Expected date: 11/30/2023  -     Vitamin D 25 hydroxy; Future; Expected date: 11/30/2023    6  Abdominal aortic aneurysm (AAA) without rupture, unspecified part [facility])  Assessment & Plan:  Follows with vascular surgery    Orders:  -     CBC; Future; Expected date: 11/30/2023  -     Comprehensive metabolic panel; Future; Expected date: 11/30/2023  -     Hemoglobin A1C; Future; Expected date: 11/30/2023  -     Lipid Panel with Direct LDL reflex; Future; Expected date: 11/30/2023  -     TSH, 3rd generation with Free T4 reflex; Future; Expected date: 11/30/2023  -     UA (URINE) with reflex to Scope; Future; Expected date: 11/30/2023  -     Vitamin D 25 hydroxy; Future; Expected date: 11/30/2023    7  Abdominal aortic atherosclerosis ([address] )  Assessment & Plan:  Needed Xarelto/statin therapy    Orders:  -     CBC; Future; Expected date: 11/30/2023  -     Comprehensive metabolic panel; Future; Expected date: 11/30/2023  -     Hemoglobin A1C; Future; Expected date: 11/30/2023  -     Lipid Panel with Direct LDL reflex; Future; Expected date: 11/30/2023  -     TSH, 3rd generation with Free T4 reflex; Future; Expected date: 11/30/2023  -     UA (URINE) with reflex to Scope; Future; Expected date: 11/30/2023  -     Vitamin D 25 hydroxy; Future; Expected date: 11/30/2023    8  Stage 3a chronic kidney disease [facility])  Assessment & Plan:  Lab Results   Component Value Date    CREATININE 1 46 (H) 05/24/2023    CREATININE 1 37 (H) 11/10/2022    CREATININE 1 30 07/19/2022    EGFR 47 05/24/2023    EGFR 51 11/10/2022    EGFR 54 07/19/2022   Stable on ARB therapy status post right nephrectomy, follows with nephrology    Orders:  -     CBC; Future; Expected date: 11/30/2023  -     Comprehensive metabolic panel; Future; Expected date: 11/30/2023  -     Hemoglobin A1C; Future; Expected date: 11/30/2023  -     Lipid Panel with Direct LDL reflex;  Future; Expected date: 11/30/2023  -     TSH, 3rd generation with Free T4 reflex; Future; Expected date: 11/30/2023  -     UA (URINE) with reflex to Scope; Future; Expected date: 11/30/2023  -     Vitamin D 25 hydroxy; Future; Expected date: 11/30/2023    9  Mixed hyperlipidemia  Assessment & Plan:  Stable on atorvastatin    Orders:  -     CBC; Future; Expected date: 11/30/2023  -     Comprehensive metabolic panel; Future; Expected date: 11/30/2023  -     Hemoglobin A1C; Future; Expected date: 11/30/2023  -     Lipid Panel with Direct LDL reflex; Future; Expected date: 11/30/2023  -     TSH, 3rd generation with Free T4 reflex; Future; Expected date: 11/30/2023  -     UA (URINE) with reflex to Scope; Future; Expected date: 11/30/2023  -     Vitamin D 25 hydroxy; Future; Expected date: 11/30/2023    10  Hyperglycemia  Assessment & Plan:  Diet controlled    Orders:  -     CBC; Future; Expected date: 11/30/2023  -     Comprehensive metabolic panel; Future; Expected date: 11/30/2023  -     Hemoglobin A1C; Future; Expected date: 11/30/2023  -     Lipid Panel with Direct LDL reflex; Future; Expected date: 11/30/2023  -     TSH, 3rd generation with Free T4 reflex; Future; Expected date: 11/30/2023  -     UA (URINE) with reflex to Scope; Future; Expected date: 11/30/2023  -     Vitamin D 25 hydroxy; Future; Expected date: 11/30/2023    11  Benign prostatic hyperplasia with nocturia  Assessment & Plan:  Stable follows with urology    Orders:  -     CBC; Future; Expected date: 11/30/2023  -     Comprehensive metabolic panel; Future; Expected date: 11/30/2023  -     Hemoglobin A1C; Future; Expected date: 11/30/2023  -     Lipid Panel with Direct LDL reflex; Future; Expected date: 11/30/2023  -     TSH, 3rd generation with Free T4 reflex; Future; Expected date: 11/30/2023  -     UA (URINE) with reflex to Scope; Future; Expected date: 11/30/2023  -     Vitamin D 25 hydroxy; Future; Expected date: 11/30/2023    12   Anticoagulated by anticoagulation treatment  Assessment & Plan:  On Xarelto for A-fib    Orders:  -     CBC; Future; Expected date: 11/30/2023  -     Comprehensive metabolic panel; Future; Expected date: 11/30/2023  -     Hemoglobin A1C; Future; Expected date: 11/30/2023  -     Lipid Panel with Direct LDL reflex; Future; Expected date: 11/30/2023  -     TSH, 3rd generation with Free T4 reflex; Future; Expected date: 11/30/2023  -     UA (URINE) with reflex to Scope; Future; Expected date: 11/30/2023  -     Vitamin D 25 hydroxy; Future; Expected date: 11/30/2023      BMI Counseling: Body mass index is 29 05 kg/m²  The BMI is above normal  Nutrition recommendations include moderation in carbohydrate intake and reducing intake of cholesterol  Exercise recommendations include exercising 3-5 times per week  Rationale for BMI follow-up plan is due to patient being overweight or obese  Depression Screening and Follow-up Plan: Patient was screened for depression during today's encounter  They screened negative with a PHQ-2 score of 0  Subjective      Patient doing well without medical complaints concerns at the present time  Blood work was reviewed  Review of Systems   Constitutional: Negative  HENT: Negative  Eyes: Negative  Respiratory: Negative  Cardiovascular: Negative  Gastrointestinal: Negative  Endocrine: Negative  Genitourinary: Negative  Musculoskeletal: Negative  Skin: Negative  Allergic/Immunologic: Negative  Neurological: Negative  Hematological: Negative  Psychiatric/Behavioral: Negative          Current Outpatient Medications on File Prior to Visit   Medication Sig   • atorvastatin (LIPITOR) 40 mg tablet Take 1 tablet (40 mg total) by mouth daily with dinner   • irbesartan (AVAPRO) 150 mg tablet TAKE 1 TABLET AT BEDTIME   • Multiple Vitamin (multivitamin) capsule Take 1 capsule by mouth daily   • rivaroxaban (XARELTO) 20 mg tablet Take 1 tablet (20 mg total) by mouth daily with dinner "      Objective     /81 (BP Location: Left arm, Patient Position: Sitting, Cuff Size: Large)   Pulse 56   Resp 20   Ht 6' 5\" (1 956 m)   Wt 111 kg (245 lb)   SpO2 97%   BMI 29 05 kg/m²     Physical Exam  Vitals and nursing note reviewed  Constitutional:       Appearance: Normal appearance  HENT:      Head: Normocephalic and atraumatic  Mouth/Throat:      Mouth: Mucous membranes are moist    Eyes:      General: No scleral icterus  Neck:      Vascular: No carotid bruit  Cardiovascular:      Rate and Rhythm: Normal rate and regular rhythm  Heart sounds: Normal heart sounds  Pulmonary:      Effort: Pulmonary effort is normal       Breath sounds: Normal breath sounds  Abdominal:      Palpations: Abdomen is soft  Tenderness: There is no abdominal tenderness  Musculoskeletal:      Cervical back: Neck supple  Right lower leg: No edema  Left lower leg: No edema  Skin:     General: Skin is warm and dry  Neurological:      General: No focal deficit present  Mental Status: He is alert     Psychiatric:         Mood and Affect: Mood normal        Alex Maradiaga DO  "

## 2023-05-30 NOTE — ASSESSMENT & PLAN NOTE
eSvero Hernandez follows with CT surgery, CT ordered from them reprinted for the patient to complete

## 2023-05-30 NOTE — PATIENT INSTRUCTIONS
Complete CT of chest for aneurysm evaluation as ordered by CT surgery department and July  Follow with cardiology for follow-up of atrial fibrillation  Diet exercise weight loss recommended  Follow with all specialist further instructions  Return in 6 months for office visit, blood work, and [employer]

## 2023-05-30 NOTE — ASSESSMENT & PLAN NOTE
Lab Results   Component Value Date    CREATININE 1 46 (H) 05/24/2023    CREATININE 1 37 (H) 11/10/2022    CREATININE 1 30 07/19/2022    EGFR 47 05/24/2023    EGFR 51 11/10/2022    EGFR 54 07/19/2022   Stable on ARB therapy status post right nephrectomy, follows with nephrology

## 2023-07-17 DIAGNOSIS — I48.91 NEW ONSET ATRIAL FIBRILLATION (HCC): ICD-10-CM

## 2023-07-17 DIAGNOSIS — E78.2 MIXED HYPERLIPIDEMIA: ICD-10-CM

## 2023-07-17 RX ORDER — ATORVASTATIN CALCIUM 40 MG/1
TABLET, FILM COATED ORAL
Qty: 90 TABLET | Refills: 3 | Status: SHIPPED | OUTPATIENT
Start: 2023-07-17

## 2023-07-17 RX ORDER — RIVAROXABAN 20 MG/1
TABLET, FILM COATED ORAL
Qty: 90 TABLET | Refills: 1 | Status: SHIPPED | OUTPATIENT
Start: 2023-07-17

## 2023-08-18 ENCOUNTER — HOSPITAL ENCOUNTER (OUTPATIENT)
Dept: CT IMAGING | Facility: HOSPITAL | Age: 73
Discharge: HOME | End: 2023-08-18
Payer: COMMERCIAL

## 2023-08-18 DIAGNOSIS — I71.20 THORACIC AORTIC ANEURYSM WITHOUT RUPTURE (HCC): ICD-10-CM

## 2023-08-18 DIAGNOSIS — R91.8 PULMONARY NODULES: ICD-10-CM

## 2023-08-18 DIAGNOSIS — Z13.6 ENCOUNTER FOR SPECIAL SCREENING EXAMINATION FOR CARDIOVASCULAR DISORDER: ICD-10-CM

## 2023-08-18 PROCEDURE — 71250 CT THORAX DX C-: CPT

## 2023-08-18 PROCEDURE — G1004 CDSM NDSC: HCPCS

## 2023-09-07 ENCOUNTER — CONSULT (OUTPATIENT)
Dept: FAMILY MEDICINE CLINIC | Facility: CLINIC | Age: 73
End: 2023-09-07
Payer: COMMERCIAL

## 2023-09-07 VITALS
RESPIRATION RATE: 16 BRPM | DIASTOLIC BLOOD PRESSURE: 72 MMHG | WEIGHT: 245 LBS | BODY MASS INDEX: 28.93 KG/M2 | TEMPERATURE: 98.6 F | HEIGHT: 77 IN | HEART RATE: 64 BPM | SYSTOLIC BLOOD PRESSURE: 126 MMHG

## 2023-09-07 DIAGNOSIS — Z01.818 PRE-OP EXAMINATION: Primary | ICD-10-CM

## 2023-09-07 DIAGNOSIS — I48.0 PAROXYSMAL ATRIAL FIBRILLATION (HCC): ICD-10-CM

## 2023-09-07 DIAGNOSIS — I10 ESSENTIAL HYPERTENSION: ICD-10-CM

## 2023-09-07 DIAGNOSIS — Z79.01 ANTICOAGULATED BY ANTICOAGULATION TREATMENT: ICD-10-CM

## 2023-09-07 DIAGNOSIS — H25.013 CORTICAL AGE-RELATED CATARACT OF BOTH EYES: ICD-10-CM

## 2023-09-07 DIAGNOSIS — I71.20 THORACIC AORTIC ANEURYSM WITHOUT RUPTURE, UNSPECIFIED PART (HCC): ICD-10-CM

## 2023-09-07 DIAGNOSIS — I70.0 ABDOMINAL AORTIC ATHEROSCLEROSIS (HCC): ICD-10-CM

## 2023-09-07 DIAGNOSIS — I71.40 ABDOMINAL AORTIC ANEURYSM (AAA) WITHOUT RUPTURE, UNSPECIFIED PART (HCC): ICD-10-CM

## 2023-09-07 PROCEDURE — 93000 ELECTROCARDIOGRAM COMPLETE: CPT | Performed by: FAMILY MEDICINE

## 2023-09-07 PROCEDURE — 99214 OFFICE O/P EST MOD 30 MIN: CPT | Performed by: FAMILY MEDICINE

## 2023-09-07 RX ORDER — PREDNISOLONE ACETATE 10 MG/ML
SUSPENSION/ DROPS OPHTHALMIC
COMMUNITY
Start: 2023-08-24

## 2023-09-07 RX ORDER — POLYMYXIN B SULFATE AND TRIMETHOPRIM 1; 10000 MG/ML; [USP'U]/ML
SOLUTION OPHTHALMIC
COMMUNITY
Start: 2023-08-24

## 2023-09-07 NOTE — PATIENT INSTRUCTIONS
Patient may hold his anticoagulation, Xarelto, 5 days before surgery  Patient should see a cardiologist for atrial fibrillation  Return at scheduled appointment sooner if needed

## 2023-09-07 NOTE — ASSESSMENT & PLAN NOTE
EKG today in NSR. Patient was confused he thought CT surgery was also cardiology.   Patient does agree to see cardiologist, referral was placed

## 2023-09-07 NOTE — PROGRESS NOTES
Name: Mercedes Izquierdo      : 1950      MRN: [id number]  Encounter Provider: Avelina Mcgarry DO  Encounter Date: 2023   Encounter department: [facility]    Assessment & Plan     1. Preoperative examination  EKG completed in NSR no acute change  Patient seen and examined  Chart reviewed  Patient cleared for upcoming surgery  2.  OU cataracts, for surgery OD, , and OS, 10/2. This will be done at the surgery center [facility] in [place] by [facility] for sight  3. Hypertension, stable continue present therapy #4. Paroxysmal A-fib currently in NSR  Patient to follow with cardiology patient was confused he thought the CT surgeon was a cardiologist.  Patient agrees to see cardiology, in NSR at the present time #4. Anticoagulated, per patient ophthalmology wants him to hold this 5 days before this is appropriate and he may hold  5. Abdominal aortic aneurysm/abdominal aortic atherosclerosis, stable follows with vascular surgery  6. Thoracic aortic aneurysm stable, follows with CT surgery  7. Return at scheduled appointment sooner if needed      1. Pre-op examination  -     POCT ECG    2. Cortical age-related cataract of both eyes    3. Essential hypertension  Assessment & Plan:  Stable continue present therapy      4. Paroxysmal atrial fibrillation (HCC)  Assessment & Plan:  EKG today in NSR. Patient was confused he thought CT surgery was also cardiology. Patient does agree to see cardiologist, referral was placed    Orders:  -     Ambulatory Referral to Cardiology; Future    5. Anticoagulated by anticoagulation treatment  Assessment & Plan:  Patient hold anticoagulation 5 days before surgery      6. Abdominal aortic aneurysm (AAA) without rupture, unspecified part ([address])  Assessment & Plan:  Stable, follows with vascular surgery      7. Abdominal aortic atherosclerosis (HCC)  Assessment & Plan:  Stable, follows with vascular surgery      8.  Thoracic aortic aneurysm without rupture, unspecified part [facility])  Assessment & Plan:  Follows with CT surgery             Subjective      Patient doing well except for vision. Patient has surgery planned. OD 9/18/2023, OS 10/2/2023 for cataract surgery. This will be done at surgical center in [facility] by [facility] for sight. Patient did not complete cardiology consultation he was confused and thought he was seeing cardiologist when he was seeing a cardiothoracic surgeon. Review of Systems   Constitutional: Negative. HENT: Negative. Eyes:        HPI   Respiratory: Negative. Cardiovascular:        HPI   Gastrointestinal: Negative. Endocrine: Negative. Genitourinary: Negative. Musculoskeletal: Negative. Skin: Negative. Allergic/Immunologic: Negative. Neurological: Negative. Hematological: Negative. Psychiatric/Behavioral: Negative. Current Outpatient Medications on File Prior to Visit   Medication Sig   • atorvastatin (LIPITOR) 40 mg tablet TAKE 1 TABLET DAILY WITH   DINNER   • irbesartan (AVAPRO) 150 mg tablet TAKE 1 TABLET AT BEDTIME   • Multiple Vitamin (multivitamin) capsule Take 1 capsule by mouth daily   • polymyxin b-trimethoprim (POLYTRIM) ophthalmic solution    • prednisoLONE acetate (PRED FORTE) 1 % ophthalmic suspension    • Xarelto 20 MG tablet TAKE 1 TABLET DAILY WITH   DINNER       Objective     /72   Pulse 64   Temp 98.6 °F (37 °C)   Resp 16   Ht 6' 5" (1.956 m)   Wt 111 kg (245 lb)   BMI 29.05 kg/m²     Physical Exam  Vitals and nursing note reviewed. Constitutional:       Appearance: Normal appearance. HENT:      Head: Normocephalic and atraumatic. Right Ear: Tympanic membrane normal.      Left Ear: Tympanic membrane normal.      Nose: Nose normal.      Mouth/Throat:      Mouth: Mucous membranes are moist.      Pharynx: Oropharynx is clear. No oropharyngeal exudate or posterior oropharyngeal erythema. Eyes:      General: No scleral icterus.         Right eye: No discharge. Left eye: No discharge. Neck:      Vascular: No carotid bruit. Cardiovascular:      Rate and Rhythm: Normal rate and regular rhythm. Heart sounds: Normal heart sounds. Pulmonary:      Effort: Pulmonary effort is normal.      Breath sounds: Normal breath sounds. Abdominal:      General: Bowel sounds are normal.      Palpations: Abdomen is soft. Tenderness: There is no abdominal tenderness. Musculoskeletal:      Cervical back: Neck supple. Right lower leg: No edema. Left lower leg: No edema. Skin:     General: Skin is warm and dry. Neurological:      General: No focal deficit present. Mental Status: He is alert.    Psychiatric:         Mood and Affect: Mood normal.       Alex Maradiaga DO

## 2023-09-14 ENCOUNTER — OFFICE VISIT (OUTPATIENT)
Dept: CARDIAC SURGERY | Facility: CLINIC | Age: 73
End: 2023-09-14
Payer: COMMERCIAL

## 2023-09-14 VITALS
WEIGHT: 243 LBS | OXYGEN SATURATION: 98 % | BODY MASS INDEX: 28.69 KG/M2 | HEIGHT: 77 IN | HEART RATE: 61 BPM | DIASTOLIC BLOOD PRESSURE: 70 MMHG | SYSTOLIC BLOOD PRESSURE: 130 MMHG

## 2023-09-14 DIAGNOSIS — I71.21 ANEURYSM OF ASCENDING AORTA WITHOUT RUPTURE (HCC): Primary | ICD-10-CM

## 2023-09-14 PROCEDURE — 99214 OFFICE O/P EST MOD 30 MIN: CPT | Performed by: THORACIC SURGERY (CARDIOTHORACIC VASCULAR SURGERY)

## 2023-09-14 NOTE — PROGRESS NOTES
Aortic Surveillance - Cardiothoracic Surgery   Susie Ren 67 y.o. male MRN: [id number]    History of Present Illness: Susie Ren is a 67y.o. year old male who presents today for ongoing surveillance of previously identified ascending aortic aneurysm. They were most recently evaluated in our office with CT imaging in August 2022. He had an incidental finding of aneurysm found on surveillance CT chest for monitoring of pulmonary nodule. He has a PMHx right renal cell carcinoma s/p right nephrectomy, hypothyroidism, PAF on Xarelto, OA, BPH, colitis, Gilbert's syndrome, LE venous varicosities, and ED. Upon interview, patient reports that he is feeling well. He denies chest pain, back pain, numbness/tingling/paresthesias, syncope, dizziness, lightheadedness. He is active - goes to the gym and does a combination of aerobic exercise and light weight lifting. He is getting cataract surgery in the coming weeks, so he is currently holding his Xarelto per the ophthalmologist preop instructions. He is a former smoker, but quit in 2006. No family history of SCD, aneurysm or dissection.          Past Medical History:  Past Medical History:   Diagnosis Date   • Arthritis    • Cancer [facility])     right kidney    • Cellulitis of left leg     June 2016   • Chronic kidney disease    • Exercises 3 to 4 times per week    • Full dentures    • History of colonoscopy 2021   • Hypertension    • Inguinal hernia, right     last assessed: 7/6/2016   • Malignant neoplasm of kidney ([address])     unspecified laterality Managed by Guera Lewis MD Last assessed: 3/1/2016   • Renal cell carcinoma ([address])    • Seasonal allergies    • Thrombosis     superficial left leg June 2016   • Varicose veins of both lower extremities          Past Surgical History:   Past Surgical History:   Procedure Laterality Date   • APPENDECTOMY     • COLONOSCOPY     • CYSTOSCOPY      Diagnostic   • HERNIA REPAIR Right 7/21/2016    Procedure: REPAIR HERNIA INGUINAL, LAPAROSCOPIC;  Surgeon: Vipin Mcnair MD;  Location: [facility];  Service:    • HIP ARTHROPLASTY Left     twice  and    • HIP ARTHROPLASTY Right        • LAPAROSCOPIC INGUINAL HERNIA REPAIR  2016    description: laparoscopic right inguinal hernia repair with mesh    • NEPHRECTOMY Right     due to a tumor   • ROTATOR CUFF REPAIR Left    • TOTAL HIP ARTHROPLASTY     • TOTAL HIP ARTHROPLASTY Bilateral          Family History:  Family History   Problem Relation Age of Onset   • Heart attack Mother    • Cancer Father          Social History:    Social History     Substance and Sexual Activity   Alcohol Use Never     Social History     Substance and Sexual Activity   Drug Use No     Social History     Tobacco Use   Smoking Status Former   • Types: Cigarettes   • Quit date: 2006   • Years since quittin.1   Smokeless Tobacco Never       Home Medications:   Prior to Admission medications    Medication Sig Start Date End Date Taking? Authorizing Provider   atorvastatin (LIPITOR) 40 mg tablet TAKE 1 TABLET DAILY WITH   DINNER 23  Yes Alex Maradiaga DO   irbesartan (AVAPRO) 150 mg tablet TAKE 1 TABLET AT BEDTIME 23  Yes Alex Maradiaga DO   Multiple Vitamin (multivitamin) capsule Take 1 capsule by mouth daily   Yes Historical Provider, MD   polymyxin b-trimethoprim (POLYTRIM) ophthalmic solution  23  Yes Historical Provider, MD   prednisoLONE acetate (PRED FORTE) 1 % ophthalmic suspension  23  Yes Historical Provider, MD   Xarelto 20 MG tablet TAKE 1 TABLET DAILY WITH   DINNER  Patient taking differently: Stopped  got approval from pcp will stop for 5 days. Prior to cataract sx. 23  Yes Alex Maradiaga DO       Allergies: Allergies   Allergen Reactions   • Nsaids Other (See Comments)     Instructed to avoid all ibuprofen's/NSAIDS  as pt only has one kidney       Review of Systems:     Review of Systems   Constitutional: Negative. HENT: Negative.     Eyes: Negative. Respiratory: Negative. Cardiovascular: Negative. Gastrointestinal: Negative. Endocrine: Negative. Genitourinary: Negative. Musculoskeletal: Negative. Skin: Negative. Allergic/Immunologic: Negative. Neurological: Negative. Hematological: Negative. Psychiatric/Behavioral: Negative. Vital Signs:     Vitals:    09/14/23 0931 09/14/23 0938   BP: 138/76 130/70   BP Location: Left arm Right arm   Patient Position: Sitting Sitting   Cuff Size: Standard Standard   Pulse: 61    SpO2: 98%    Weight: 110 kg (243 lb)    Height: 6' 5" (1.956 m)        Physical Exam:     Physical Exam  Vitals reviewed. Constitutional:       Appearance: Normal appearance. HENT:      Head: Normocephalic and atraumatic. Eyes:      Pupils: Pupils are equal, round, and reactive to light. Cardiovascular:      Rate and Rhythm: Normal rate and regular rhythm. Heart sounds: No murmur heard. Pulmonary:      Effort: Pulmonary effort is normal.   Abdominal:      General: Bowel sounds are normal.      Palpations: Abdomen is soft. Tenderness: There is no abdominal tenderness. Musculoskeletal:         General: Normal range of motion. Cervical back: Normal range of motion. Skin:     General: Skin is warm and dry. Capillary Refill: Capillary refill takes less than 2 seconds. Neurological:      General: No focal deficit present. Mental Status: He is alert and oriented to person, place, and time. Psychiatric:         Mood and Affect: Mood normal.         Behavior: Behavior normal.         Lab Results:               Invalid input(s): "LABGLOM"      Lab Results   Component Value Date    HGBA1C 5.6 05/24/2023     No results found for: "CKTOTAL", "CKMB", "CKMBINDEX", "TROPONINI"    Imaging Studies:     CT Chest: 8/18/23  Stable 4.5 cm ectasia of the ascending thoracic aorta. Continued follow-up in 1 year recommended.     Echocardiogram: 7/19/22  Left Ventricle Left ventricular cavity size is normal. Wall thickness is increased. There is mild to moderate concentric hypertrophy. The left ventricular ejection fraction is 60%. Systolic function is normal. Global longitudinal strain is normal at -19.5%. Wall motion is normal. Diastolic function is normal.      Right Ventricle Right ventricular cavity size is normal. Systolic function is normal. Wall thickness is normal.      Left Atrium The atrium is normal in size. Right Atrium The atrium is normal in size. Aortic Valve The aortic valve is trileaflet. The leaflets are mildly thickened. The leaflets are not calcified. The leaflets exhibit normal mobility. There is no evidence of regurgitation. The aortic valve has no significant stenosis. Mitral Valve The mitral valve has normal structure and function. There is no evidence of regurgitation. There is no evidence of stenosis. Tricuspid Valve Tricuspid valve structure is normal. There is no evidence of regurgitation. There is no evidence of stenosis. Pulmonic Valve Pulmonic valve structure is normal. There is no evidence of regurgitation. There is no evidence of stenosis. Ascending Aorta The aortic root is normal in size. IVC/SVC The inferior vena cava is normal in size. Pericardium There is no pericardial effusion. The pericardium is normal in appearance. I have personally reviewed pertinent reports.       Assessment:  Patient Active Problem List    Diagnosis Date Noted   • Thoracic aortic aneurysm without rupture ([address]) 08/03/2022   • Anticoagulated by anticoagulation treatment 07/20/2022   • Paroxysmal atrial fibrillation ([address]) 07/18/2022   • Abdominal aortic atherosclerosis ([address]) 10/20/2021   • Abdominal aortic aneurysm (AAA) without rupture ([address]) 03/24/2021   • Acquired hypothyroidism 10/22/2019   • Gilbert's syndrome 04/12/2019   • Personal history of kidney cancer 04/02/2019   • Health care maintenance 10/05/2018   • Tinnitus 10/05/2018   • Inguinal hernia unilateral, non-recurrent 07/21/2016   • Colon polyp 04/21/2015   • ED (erectile dysfunction) 04/21/2015   • Benign prostatic hyperplasia with nocturia 09/03/2013   • Gait disturbance 09/03/2013   • Left knee pain 07/03/2013   • Allergic rhinitis 02/13/2013   • Stage 3 chronic kidney disease ([address]) 08/16/2012   • Hyperglycemia 08/16/2012   • Mixed hyperlipidemia 08/16/2012   • Essential hypertension 08/16/2012   • Osteoarthritis 08/16/2012     Ascending aortic aneurysm; Ongoing ascending aortic replacement workup    Plan:     CT chest, without contrast performed prior to the visit today was reviewed. Ascending aorta was measured at 45 mm at it's greatest diameter. These findings were confirmed and shared with the patient today. As there has been no interval enlargement since 2022, follow-up monitoring is the treatment plan. Arrangements have been made for future surveillance to be completed with CT chest, without contrast in 1 Years. Tyrell Montanez does not meet the following Ascending aortic aneurysm surgical triggers:    * 4.5 cm or > in the setting of + FH of rupture or dissection  * 5 cm with moderate or worse aortic valve disease  *  5.5 cm regardless of aortic valve function and without genetically associated aortic disease   *  Aortic growth > 4mm / year  *  Bicuspid aortic valve with valve dysfunction enough to meet indications for surgery and concomitant ascending aortic aneurysm 4.5 cm or >  * 4.5 cm or > in setting of existing connective tissue disease of Marfan's syndrome, Pelon-Danlos syndrome or familiar aneurysms syndrome      Tyrell Montanez was comfortable with our recommendations, and their questions were answered to their satisfaction. Thank you for allowing us to participate in the care of this patient. Aortic Aneurysm Instructions were provided to the patient as follows:    1. No lifting more than 50 pounds  2.  Maintain a controlled blood pressure with a goal of 120/80. 3. Follow up in Aortic Clinic as recommended with radiology follow up as instructed  4. Report to the ER or call 911 immediately with the following signs / symptoms: sudden onset of back pain, chest pain or shortness of breath. The patient recently had a screening colonoscopy in 2021. Therefore GI referral is not indicated at this time.      SIGNATURE: Guerry Collet, PA-C  DATE: 09/14/23  TIME: 10:05 AM

## 2023-09-14 NOTE — LETTER
September 14, 2023     Bettina Sandhu DO  [phone] [facility] [address]    Patient: Melissa Gonzalez   YOB: 1950   Date of Visit: 9/14/2023       Dear Dr. Bruce Denton: Thank you for referring Sorin Hankins to me for evaluation. Below are my notes for this consultation. If you have questions, please do not hesitate to call me. I look forward to following your patient along with you. Sincerely,        Mauricio Gramajo MD        CC: No Recipients    Seth Bermudezs  9/14/2023 10:17 AM  Attested  Aortic Surveillance - Cardiothoracic Surgery   Melissa Gonzalez 67 y.o. male MRN: [id number]    History of Present Illness: Melissa Gonzalez is a 67y.o. year old male who presents today for ongoing surveillance of previously identified ascending aortic aneurysm. They were most recently evaluated in our office with CT imaging in August 2022. He had an incidental finding of aneurysm found on surveillance CT chest for monitoring of pulmonary nodule. He has a PMHx right renal cell carcinoma s/p right nephrectomy, hypothyroidism, PAF on Xarelto, OA, BPH, colitis, Gilbert's syndrome, LE venous varicosities, and ED. Upon interview, patient reports that he is feeling well. He denies chest pain, back pain, numbness/tingling/paresthesias, syncope, dizziness, lightheadedness. He is active - goes to the gym and does a combination of aerobic exercise and light weight lifting. He is getting cataract surgery in the coming weeks, so he is currently holding his Xarelto per the ophthalmologist preop instructions. He is a former smoker, but quit in 2006. No family history of SCD, aneurysm or dissection.          Past Medical History:  Past Medical History:   Diagnosis Date   • Arthritis    • Cancer [facility])     right kidney    • Cellulitis of left leg     June 2016   • Chronic kidney disease    • Exercises 3 to 4 times per week    • Full dentures    • History of colonoscopy 2021   • Hypertension    • Inguinal hernia, right     last assessed: 2016   • Malignant neoplasm of kidney [facility])     unspecified laterality Managed by Teto Mart MD Last assessed: 3/1/2016   • Renal cell carcinoma ([address])    • Seasonal allergies    • Thrombosis     superficial left leg 2016   • Varicose veins of both lower extremities          Past Surgical History:   Past Surgical History:   Procedure Laterality Date   • APPENDECTOMY     • COLONOSCOPY     • CYSTOSCOPY      Diagnostic   • HERNIA REPAIR Right 2016    Procedure: REPAIR HERNIA INGUINAL, LAPAROSCOPIC;  Surgeon: Soha Todd MD;  Location: [facility];  Service:    • HIP ARTHROPLASTY Left     twice  and    • HIP ARTHROPLASTY Right        • LAPAROSCOPIC INGUINAL HERNIA REPAIR  2016    description: laparoscopic right inguinal hernia repair with mesh    • NEPHRECTOMY Right     due to a tumor   • ROTATOR CUFF REPAIR Left    • TOTAL HIP ARTHROPLASTY     • TOTAL HIP ARTHROPLASTY Bilateral          Family History:  Family History   Problem Relation Age of Onset   • Heart attack Mother    • Cancer Father          Social History:    Social History     Substance and Sexual Activity   Alcohol Use Never     Social History     Substance and Sexual Activity   Drug Use No     Social History     Tobacco Use   Smoking Status Former   • Types: Cigarettes   • Quit date: 2006   • Years since quittin.1   Smokeless Tobacco Never       Home Medications:   Prior to Admission medications    Medication Sig Start Date End Date Taking?  Authorizing Provider   atorvastatin (LIPITOR) 40 mg tablet TAKE 1 TABLET DAILY WITH   DINNER 23  Yes Alex Maradiaga DO   irbesartan (AVAPRO) 150 mg tablet TAKE 1 TABLET AT BEDTIME 23  Yes Alex Maradiaga DO   Multiple Vitamin (multivitamin) capsule Take 1 capsule by mouth daily   Yes Historical Provider, MD   polymyxin b-trimethoprim (POLYTRIM) ophthalmic solution  23  Yes Historical Provider, MD   prednisoLONE acetate (PRED FORTE) 1 % ophthalmic suspension  8/24/23  Yes Historical Provider, MD   Xarelto 20 MG tablet TAKE 1 TABLET DAILY WITH   DINNER  Patient taking differently: Stopped 9/13 got approval from pcp will stop for 5 days. Prior to cataract sx. 7/17/23  Yes Alex Maradiaga DO       Allergies: Allergies   Allergen Reactions   • Nsaids Other (See Comments)     Instructed to avoid all ibuprofen's/NSAIDS  as pt only has one kidney       Review of Systems:     Review of Systems   Constitutional: Negative. HENT: Negative. Eyes: Negative. Respiratory: Negative. Cardiovascular: Negative. Gastrointestinal: Negative. Endocrine: Negative. Genitourinary: Negative. Musculoskeletal: Negative. Skin: Negative. Allergic/Immunologic: Negative. Neurological: Negative. Hematological: Negative. Psychiatric/Behavioral: Negative. Vital Signs:     Vitals:    09/14/23 0931 09/14/23 0938   BP: 138/76 130/70   BP Location: Left arm Right arm   Patient Position: Sitting Sitting   Cuff Size: Standard Standard   Pulse: 61    SpO2: 98%    Weight: 110 kg (243 lb)    Height: 6' 5" (1.956 m)        Physical Exam:     Physical Exam  Vitals reviewed. Constitutional:       Appearance: Normal appearance. HENT:      Head: Normocephalic and atraumatic. Eyes:      Pupils: Pupils are equal, round, and reactive to light. Cardiovascular:      Rate and Rhythm: Normal rate and regular rhythm. Heart sounds: No murmur heard. Pulmonary:      Effort: Pulmonary effort is normal.   Abdominal:      General: Bowel sounds are normal.      Palpations: Abdomen is soft. Tenderness: There is no abdominal tenderness. Musculoskeletal:         General: Normal range of motion. Cervical back: Normal range of motion. Skin:     General: Skin is warm and dry. Capillary Refill: Capillary refill takes less than 2 seconds.    Neurological:      General: No focal deficit present. Mental Status: He is alert and oriented to person, place, and time. Psychiatric:         Mood and Affect: Mood normal.         Behavior: Behavior normal.         Lab Results:               Invalid input(s): "LABGLOM"      Lab Results   Component Value Date    HGBA1C 5.6 05/24/2023     No results found for: "CKTOTAL", "CKMB", "CKMBINDEX", "TROPONINI"    Imaging Studies:     CT Chest: 8/18/23  Stable 4.5 cm ectasia of the ascending thoracic aorta. Continued follow-up in 1 year recommended. Echocardiogram: 7/19/22  Left Ventricle Left ventricular cavity size is normal. Wall thickness is increased. There is mild to moderate concentric hypertrophy. The left ventricular ejection fraction is 60%. Systolic function is normal. Global longitudinal strain is normal at -19.5%. Wall motion is normal. Diastolic function is normal.      Right Ventricle Right ventricular cavity size is normal. Systolic function is normal. Wall thickness is normal.      Left Atrium The atrium is normal in size. Right Atrium The atrium is normal in size. Aortic Valve The aortic valve is trileaflet. The leaflets are mildly thickened. The leaflets are not calcified. The leaflets exhibit normal mobility. There is no evidence of regurgitation. The aortic valve has no significant stenosis. Mitral Valve The mitral valve has normal structure and function. There is no evidence of regurgitation. There is no evidence of stenosis. Tricuspid Valve Tricuspid valve structure is normal. There is no evidence of regurgitation. There is no evidence of stenosis. Pulmonic Valve Pulmonic valve structure is normal. There is no evidence of regurgitation. There is no evidence of stenosis. Ascending Aorta The aortic root is normal in size. IVC/SVC The inferior vena cava is normal in size. Pericardium There is no pericardial effusion. The pericardium is normal in appearance.             I have personally reviewed pertinent reports. Assessment:  Patient Active Problem List    Diagnosis Date Noted   • Thoracic aortic aneurysm without rupture ([address]) 08/03/2022   • Anticoagulated by anticoagulation treatment 07/20/2022   • Paroxysmal atrial fibrillation ([address]) 07/18/2022   • Abdominal aortic atherosclerosis ([address]) 10/20/2021   • Abdominal aortic aneurysm (AAA) without rupture ([address]) 03/24/2021   • Acquired hypothyroidism 10/22/2019   • Gilbert's syndrome 04/12/2019   • Personal history of kidney cancer 04/02/2019   • Health care maintenance 10/05/2018   • Tinnitus 10/05/2018   • Inguinal hernia unilateral, non-recurrent 07/21/2016   • Colon polyp 04/21/2015   • ED (erectile dysfunction) 04/21/2015   • Benign prostatic hyperplasia with nocturia 09/03/2013   • Gait disturbance 09/03/2013   • Left knee pain 07/03/2013   • Allergic rhinitis 02/13/2013   • Stage 3 chronic kidney disease ([address]) 08/16/2012   • Hyperglycemia 08/16/2012   • Mixed hyperlipidemia 08/16/2012   • Essential hypertension 08/16/2012   • Osteoarthritis 08/16/2012     Ascending aortic aneurysm; Ongoing ascending aortic replacement workup    Plan:     CT chest, without contrast performed prior to the visit today was reviewed. Ascending aorta was measured at 45 mm at it's greatest diameter. These findings were confirmed and shared with the patient today. As there has been no interval enlargement since 2022, follow-up monitoring is the treatment plan. Arrangements have been made for future surveillance to be completed with CT chest, without contrast in 1 Years.     Marcia Hendricks does not meet the following Ascending aortic aneurysm surgical triggers:    * 4.5 cm or > in the setting of + FH of rupture or dissection  * 5 cm with moderate or worse aortic valve disease  *  5.5 cm regardless of aortic valve function and without genetically associated aortic disease   *  Aortic growth > 4mm / year  *  Bicuspid aortic valve with valve dysfunction enough to meet indications for surgery and concomitant ascending aortic aneurysm 4.5 cm or >  * 4.5 cm or > in setting of existing connective tissue disease of Marfan's syndrome, Pelon-Danlos syndrome or familiar aneurysms syndrome      Anali Jefferson was comfortable with our recommendations, and their questions were answered to their satisfaction. Thank you for allowing us to participate in the care of this patient. Aortic Aneurysm Instructions were provided to the patient as follows:    1. No lifting more than 50 pounds  2. Maintain a controlled blood pressure with a goal of 120/80. 3. Follow up in Aortic Clinic as recommended with radiology follow up as instructed  4. Report to the ER or call 911 immediately with the following signs / symptoms: sudden onset of back pain, chest pain or shortness of breath. The patient recently had a screening colonoscopy in 2021. Therefore GI referral is not indicated at this time. SIGNATURE: Gm Aguiar PA-C  DATE: 09/14/23  TIME: 10:05 AM  Attestation signed by Elissa Walker MD at 9/14/2023 12:54 PM:  Patient seen and evaluated with [address] [place] [address] / [facility]. I agree with the above assessment and plan with the following additions. Patient is a 76-year-old man with a history of an asymptomatic ascending aortic aneurysm, he returns today for a 1 year follow-up. He remains asymptomatic. I personally reviewed his most recent diagnostic images, CT of the chest on 8/18/2023 shows an ascending aorta with a maximum diameter of 4.5 cm which is virtually unchanged from CT or 2022 where it had a maximum diameter of 4.4 cm. There is no need for intervention at this point. We talked about importance of maintaining adequate blood pressure control, avoid tobacco products, avoid twisting and straining, he was instructed to go to the emergency department if he were to experience severe chest pain or severe upper back pain.   I would like to see him back in 1 year with a noncontrast CT of the chest.    This note was completed in part utilizing [employer] direct voice recognition software. Grammatical errors, random word insertion, spelling mistakes, and incomplete sentences may be an occasional consequence of the system secondary to software limitations, ambient noise and hardware issues. At the time of dictation, efforts were made to edit, clarify and /or correct errors. Please read the chart carefully and recognize, using context, where substitutions have occurred. If you have any questions or concerns about the context, text or information contained within the body of this dictation, please contact myself, the provider, for further clarification.

## 2023-09-20 ENCOUNTER — TELEPHONE (OUTPATIENT)
Dept: ADMINISTRATIVE | Facility: HOSPITAL | Age: 73
End: 2023-09-20

## 2023-09-20 NOTE — TELEPHONE ENCOUNTER
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  Based on what I see, in the absence of new symptoms, it seems annual duplex surveillance with office visit, thus, defer additional testing to Dr. Beth Leyva. -RD

## 2023-09-20 NOTE — TELEPHONE ENCOUNTER
Patient is scheduled for 10/17/23 with Dr Alaina Matthews to review an aoil from April of 2022.   [facility] doesn't have a letter for a repeat test.  Should patient have another doppler prior to coming in?

## 2023-09-21 ENCOUNTER — TRANSCRIBE ORDERS (OUTPATIENT)
Dept: ADMINISTRATIVE | Facility: HOSPITAL | Age: 73
End: 2023-09-21

## 2023-09-21 DIAGNOSIS — I70.0 ABDOMINAL AORTIC ATHEROSCLEROSIS (HCC): Primary | ICD-10-CM

## 2023-10-16 DIAGNOSIS — I10 ESSENTIAL HYPERTENSION: ICD-10-CM

## 2023-10-16 RX ORDER — IRBESARTAN 150 MG/1
TABLET ORAL
Qty: 90 TABLET | Refills: 1 | Status: SHIPPED | OUTPATIENT
Start: 2023-10-16 | End: 2023-10-16 | Stop reason: SDUPTHER

## 2023-10-16 RX ORDER — IRBESARTAN 150 MG/1
150 TABLET ORAL
Qty: 90 TABLET | Refills: 1 | Status: SHIPPED | OUTPATIENT
Start: 2023-10-16

## 2023-10-16 NOTE — TELEPHONE ENCOUNTER
Rep from pt's pharmacy called to request refill of Irbesartan 150mg, 90 day supply. Can be sent to the [facility] [address] pharmacy.

## 2023-11-14 PROBLEM — I70.0 ABDOMINAL AORTIC ATHEROSCLEROSIS (HCC): Status: RESOLVED | Noted: 2021-10-20 | Resolved: 2023-11-14

## 2023-11-14 PROBLEM — I71.40 ABDOMINAL AORTIC ANEURYSM (AAA) WITHOUT RUPTURE (HCC): Status: RESOLVED | Noted: 2021-03-24 | Resolved: 2023-11-14

## 2023-11-14 NOTE — PROGRESS NOTES
Cardiology Consultation     Chris Samano  [id number]  1950  [facility] CARDIOLOGY [address] [place] [address]      1. Paroxysmal atrial fibrillation [facility])  Ambulatory Referral to Cardiology    POCT ECG    AMB extended holter monitor      2. Abdominal aortic atherosclerosis ([address])        3. Aneurysm of ascending aorta without rupture ([address])        4. Essential hypertension        5. Mixed hyperlipidemia            Discussion/Summary:  Paroxysmal atrial fibrillation  -Short episode of atrial fibrillation noted during an urgent care visit in July 2022  - Anticoagulated on Xarelto 20 mg daily  -Not on AV attila blockers due to bradycardia at rest  -TTE 7/19/2023 showed EF 60%, no significant valvular disease  - Will check a 2-week Zio patch to evaluate for recurrent atrial fibrillation  AAA  - CT chest 8/18/2023 showed a 4.5 cm ascending thoracic aortic aneurysm  - Following with CT surgery  Hypertension  - Continue with irbesartan 150 mg daily  -Blood pressure well controlled today  Hyperlipidemia  - Continue with atorvastatin 40 mg daily  - Lipid profile 5/24/2023 showed total cholesterol 115, triglycerides 79, HDL 52, LDL 47  Right renal cell carcinoma  - Status post right nephrectomy in 2006  CKD stage III  Hypothyroidism  [place] syndrome  Lower extremity varicose veins    Follow-up in 6 months. History of Present Illness:  Chris Samano is a 67y.o. year old male with a past medical history of paroxysmal atrial fibrillation, hypertension, hyperlipidemia, AAA, CKD stage III, right renal cell carcinoma status post right nephrectomy, hypothyroidism, osteoarthritis, BPH, Gilbert's syndrome, and lower extremity varicose veins. Last year in July 2022, patient reports sitting at home doing a crossword puzzle when he reports feeling unwell. He felt something abnormal with his heart.   When he checked his pulse, he felt his heart beating quickly and irregularly, so he went to urgent care.  There he was found to be in atrial fibrillation, so EMS was called and brought to the emergency department. By time he arrived in the ED, he had converted back to normal rhythm. Denies any recurrent episodes since then. He has been taking the Xarelto regularly and only stopped it for a few days before his cataract surgeries. Reports easy bruising and bleeding after shaving. Otherwise no other major bleeding or blood in the stool. Reports going to the gym 3 days a week for about 2 hours each time. Reports exercising on a bike and then lifting some light weights at the gym.       Patient Active Problem List   Diagnosis    Inguinal hernia unilateral, non-recurrent    Allergic rhinitis    Benign prostatic hyperplasia with nocturia    Stage 3 chronic kidney disease (HCC)    Colon polyp    ED (erectile dysfunction)    Gait disturbance    Hyperglycemia    Mixed hyperlipidemia    Essential hypertension    Left knee pain    Osteoarthritis    Health care maintenance    Tinnitus    Personal history of kidney cancer    Gilbert's syndrome    Acquired hypothyroidism    Paroxysmal atrial fibrillation (HCC)    Anticoagulated by anticoagulation treatment    Thoracic aortic aneurysm without rupture [facility])     Past Medical History:   Diagnosis Date    Arthritis     Cancer ([address])     right kidney     Cellulitis of left leg     June 2016    Chronic kidney disease     Exercises 3 to 4 times per week     Full dentures     History of colonoscopy 2021    Hypertension     Inguinal hernia, right     last assessed: 7/6/2016    Malignant neoplasm of kidney ([address])     unspecified laterality Managed by Rayo Rodriguez MD Last assessed: 3/1/2016    Renal cell carcinoma (HCC)     Seasonal allergies     Thrombosis     superficial left leg June 2016    Varicose veins of both lower extremities      Social History     Socioeconomic History    Marital status: /Civil Union     Spouse name: Not on file    Number of children: Not on file Years of education: Not on file    Highest education level: Not on file   Occupational History    Not on file   Tobacco Use    Smoking status: Former     Types: Cigarettes     Quit date: 2006     Years since quittin.3    Smokeless tobacco: Never   Vaping Use    Vaping Use: Never used   Substance and Sexual Activity    Alcohol use: Never    Drug use: No    Sexual activity: Not on file   Other Topics Concern    Not on file   Social History Narrative    Not on file     Social Determinants of Health     Financial Resource Strain: Low Risk  (2022)    Overall Financial Resource Strain (CARDIA)     Difficulty of Paying Living Expenses: Not hard at all   Food Insecurity: Not on file   Transportation Needs: No Transportation Needs (2022)    PRAPARE - Transportation     Lack of Transportation (Medical): No     Lack of Transportation (Non-Medical):  No   Physical Activity: Not on file   Stress: Not on file   Social Connections: Not on file   Intimate Partner Violence: Not on file   Housing Stability: Not on file      Family History   Problem Relation Age of Onset    Heart attack Mother     Cancer Father      Past Surgical History:   Procedure Laterality Date    APPENDECTOMY      COLONOSCOPY      CYSTOSCOPY      Diagnostic    HERNIA REPAIR Right 2016    Procedure: REPAIR HERNIA INGUINAL, LAPAROSCOPIC;  Surgeon: Ruy Kc MD;  Location: [facility] OR;  Service:     HIP ARTHROPLASTY Left     twice  and     HIP ARTHROPLASTY Right     [address]  2016    description: laparoscopic right inguinal hernia repair with mesh     NEPHRECTOMY Right     due to a tumor    ROTATOR CUFF REPAIR Left     TOTAL HIP ARTHROPLASTY      TOTAL HIP ARTHROPLASTY Bilateral        Current Outpatient Medications:     atorvastatin (LIPITOR) 40 mg tablet, TAKE 1 TABLET DAILY WITH   DINNER, Disp: 90 tablet, Rfl: 3    irbesartan (AVAPRO) 150 mg tablet, Take 1 tablet (150 mg total) by mouth daily at bedtime, Disp: 90 tablet, Rfl: 1    Multiple Vitamin (multivitamin) capsule, Take 1 capsule by mouth daily, Disp: , Rfl:     Xarelto 20 MG tablet, TAKE 1 TABLET DAILY WITH   DINNER (Patient taking differently: Stopped  got approval from pcp will stop for 5 days. Prior to cataract sx.), Disp: 90 tablet, Rfl: 1    polymyxin b-trimethoprim (POLYTRIM) ophthalmic solution, , Disp: , Rfl:     prednisoLONE acetate (PRED FORTE) 1 % ophthalmic suspension, , Disp: , Rfl:   Allergies   Allergen Reactions    Nsaids Other (See Comments)     Instructed to avoid all ibuprofen's/NSAIDS  as pt only has one kidney         Labs:  Lab Results   Component Value Date    ALT 30 2023    AST 19 2023    BUN 24 2023    CALCIUM 9.7 2023     (H) 2023    CHOL 216 2014    CO2 29 2023    CREATININE 1.46 (H) 2023    HDL 52 2023    HCT 44.4 2023    HGB 14.0 2023    HGBA1C 5.6 2023    MG 1.4 (L) 2022    PHOS 2.9 2022     2023    K 4.2 2023    PSA 0.3 10/21/2022     2014    TRIG 79 2023    WBC 9.09 2023       Imaging: No results found. EC/15/2023: Sinus bradycardia at a rate of 57 bpm, otherwise normal ECG    Review of Systems:  Review of Systems   Constitutional:  Negative for chills, diaphoresis, fatigue and fever. HENT:  Negative for congestion. Eyes:  Negative for photophobia and visual disturbance. Respiratory:  Negative for chest tightness and shortness of breath. Cardiovascular:  Negative for chest pain, palpitations and leg swelling. Gastrointestinal:  Negative for abdominal distention, abdominal pain, diarrhea, nausea and vomiting. Genitourinary:  Negative for difficulty urinating and dysuria. Musculoskeletal:  Negative for arthralgias, gait problem and joint swelling. Skin:  Negative for color change, pallor and rash.    Neurological:  Negative for dizziness, syncope, numbness and headaches. Hematological:  Bruises/bleeds easily. Psychiatric/Behavioral:  Negative for agitation, behavioral problems and confusion.           Vitals:    11/15/23 0954   BP: 132/90   Pulse: 57      Vitals:    11/15/23 0954   Weight: 109 kg (241 lb)           Physical Exam:  General appearance:  Appears stated age, alert, well appearing and in no distress  HEENT:  PERRLA, EOMI, no scleral icterus, no conjunctival pallor  NECK:  Supple, No elevated JVP, no thyromegaly, no carotid bruits  HEART:  Regular rate and rhythm, normal S1/S2, no S3/S4, no murmur or rub  LUNGS:  Clear to auscultation bilaterally  ABDOMEN:  Soft, non-tender, positive bowel sounds, no rebound or guarding, no organomegaly   EXTREMITIES:  No edema  VASCULAR:  Normal pedal pulses   SKIN: No lesions or rashes on exposed skin  NEURO:  CN II-XII intact, no focal deficits

## 2023-11-15 ENCOUNTER — CONSULT (OUTPATIENT)
Dept: CARDIOLOGY CLINIC | Facility: CLINIC | Age: 73
End: 2023-11-15
Payer: COMMERCIAL

## 2023-11-15 VITALS
WEIGHT: 241 LBS | BODY MASS INDEX: 28.58 KG/M2 | DIASTOLIC BLOOD PRESSURE: 90 MMHG | HEART RATE: 57 BPM | SYSTOLIC BLOOD PRESSURE: 132 MMHG

## 2023-11-15 DIAGNOSIS — I10 ESSENTIAL HYPERTENSION: ICD-10-CM

## 2023-11-15 DIAGNOSIS — I48.0 PAROXYSMAL ATRIAL FIBRILLATION (HCC): Primary | ICD-10-CM

## 2023-11-15 DIAGNOSIS — I71.21 ANEURYSM OF ASCENDING AORTA WITHOUT RUPTURE (HCC): ICD-10-CM

## 2023-11-15 DIAGNOSIS — I70.0 ABDOMINAL AORTIC ATHEROSCLEROSIS (HCC): ICD-10-CM

## 2023-11-15 DIAGNOSIS — E78.2 MIXED HYPERLIPIDEMIA: ICD-10-CM

## 2023-11-15 PROCEDURE — 99214 OFFICE O/P EST MOD 30 MIN: CPT | Performed by: STUDENT IN AN ORGANIZED HEALTH CARE EDUCATION/TRAINING PROGRAM

## 2023-11-15 PROCEDURE — 93000 ELECTROCARDIOGRAM COMPLETE: CPT | Performed by: STUDENT IN AN ORGANIZED HEALTH CARE EDUCATION/TRAINING PROGRAM

## 2023-11-21 ENCOUNTER — HOSPITAL ENCOUNTER (OUTPATIENT)
Dept: NON INVASIVE DIAGNOSTICS | Facility: CLINIC | Age: 73
Discharge: HOME | End: 2023-11-21
Payer: COMMERCIAL

## 2023-11-21 DIAGNOSIS — I70.0 ABDOMINAL AORTIC ATHEROSCLEROSIS (HCC): ICD-10-CM

## 2023-11-21 PROCEDURE — 93978 VASCULAR STUDY: CPT

## 2023-11-21 PROCEDURE — 93923 UPR/LXTR ART STDY 3+ LVLS: CPT

## 2023-11-21 PROCEDURE — 93978 VASCULAR STUDY: CPT | Performed by: SURGERY

## 2023-11-21 PROCEDURE — 93922 UPR/L XTREMITY ART 2 LEVELS: CPT | Performed by: SURGERY

## 2023-12-04 ENCOUNTER — OFFICE VISIT (OUTPATIENT)
Dept: VASCULAR SURGERY | Facility: CLINIC | Age: 73
End: 2023-12-04
Payer: COMMERCIAL

## 2023-12-04 VITALS
HEART RATE: 63 BPM | OXYGEN SATURATION: 98 % | DIASTOLIC BLOOD PRESSURE: 78 MMHG | BODY MASS INDEX: 28.97 KG/M2 | SYSTOLIC BLOOD PRESSURE: 122 MMHG | HEIGHT: 77 IN | WEIGHT: 245.4 LBS

## 2023-12-04 DIAGNOSIS — I71.40 ABDOMINAL AORTIC ANEURYSM (AAA) WITHOUT RUPTURE, UNSPECIFIED PART (HCC): Primary | ICD-10-CM

## 2023-12-04 PROCEDURE — 99214 OFFICE O/P EST MOD 30 MIN: CPT | Performed by: NURSE PRACTITIONER

## 2023-12-04 NOTE — PROGRESS NOTES
Assessment/Plan:    Abdominal aortic aneurysm (AAA) without rupture [facility])  53-year-old male with PMHx hypothyroidism, HTN, A-fib (xarelto), thoracic aortic aneurysm, OA, CKD stage 3 s/p nephrectomy, BPH, HLD, gilbert's syndrome returns to the office for review of his AOIL with CARLENE's. AOIL from 11/21/23 demonstrates  The abdominal aortic 3.4 cm (Prior: 3.5 cm)  The right proximal EMMA is aneurysmal, 2.1 cm (prior 1.6 cm) . The left distal EMMA is aneurysmal, 2.2 cm (prior: 2.1). B/L CARLENE's are normal with non-compressible vessels    -Denies abdominal pain, abdominal mass.  -No self-limiting claudication, no rest pain, no wounds/ tissue loss. -Reviewed abdominal ultrasound in detail with pt and answered all questions. Discussed pathophysiology of arterial aneurysm, indications for treatment and conservative treatment measures. Criteria for intervention including size>=5.5 cm, growth >0.5cm in 6 months. Will continue with non-invasive surveillance yearly and medical management with atorvastatin. Pt verbalized understanding and is agreeable to the plan at this time. Recommendations  -Complete AOIL with ABIs in 1 year and return to the office for review.  -On Xarelto for A-fib Reports brusing, discussed if he were to be d/c'd off xarelto he should be placed on ASA/plavix. Reluctant to use ASA at this time d/t hx of nephrectomy. Pt verbalized understanding.  -Continue taking Atorvastatin daily as per PCP. -Go to the ED/ Call 911 with any s/s of ruptured AAA. -Call the office with any questions or concerns, new or worsening symptoms. Call the office for increase in leg pain, swelling, discoloration, new wounds.      Essential hypertension  -BP well controlled  -continue current medical regimen  -management per PCP     Mixed hyperlipidemia  -stable  -continue statin medication  -continue routine follow-up with family doctor        Diagnoses and all orders for this visit:    Abdominal aortic aneurysm (AAA) without rupture, unspecified part ([address])  -     VAS ABDOMINAL AORTA/ILIACS; WITH CARLENE'S; Future          Subjective:      Patient ID: Nabila Hernandez is a 75 y.o. male. Patient presents for yearly follow up and review AOIL with CARLENE's done 11/21/2023. He denies pain with walking or wounds of bilateral lower extremities. He is taking Atorvastatin and Xarelto. 25-year-old male with PMHx hypothyroidism, HTN, A-fib (xarelto), thoracic aortic aneurysm, OA, CKD stage 3, BPH, HLD, gilbert's syndrome returns to the office for review of his AOIL with CARLENE's. Currently wear Zio monitor. Goes to the gym 3 times a week, he rides the bike and lifts weights. Reports he has musculoskeletal pain in his b/l knees when he walks. Has a history of b/l hip replacements. Had cataract surgery recently. Denies abdominal/ back pain. Denies abdominal mass. Reports compliance with atorvastatin, xarelto daily. Former smoker. He does not take any ASA due to nephectomy         The following portions of the patient's history were reviewed and updated as appropriate: allergies, current medications, past family history, past medical history, past social history, past surgical history, and problem list.    Review of Systems   Constitutional: Negative. HENT: Negative. Eyes: Negative. Respiratory: Negative. Negative for shortness of breath. Cardiovascular: Negative. Negative for chest pain and leg swelling. Gastrointestinal: Negative. Endocrine: Negative. Genitourinary: Negative. Musculoskeletal:  Positive for arthralgias and gait problem. Skin: Negative. Allergic/Immunologic: Negative. Neurological:  Negative for dizziness and headaches. Hematological: Negative. Psychiatric/Behavioral: Negative.            Objective:      /78 (BP Location: Left arm, Patient Position: Sitting, Cuff Size: Large)   Pulse 63   Ht 6' 5" (1.956 m)   Wt 111 kg (245 lb 6.4 oz)   SpO2 98%   BMI 29.10 kg/m² Physical Exam  Vitals and nursing note reviewed. Constitutional:       Appearance: Normal appearance. He is normal weight. HENT:      Head: Normocephalic and atraumatic. Neck:      Vascular: No carotid bruit. Cardiovascular:      Rate and Rhythm: Normal rate and regular rhythm. Pulses:           Radial pulses are 2+ on the right side and 2+ on the left side. Femoral pulses are 0 on the right side and 0 on the left side. Dorsalis pedis pulses are 2+ on the right side and 0 on the left side. Posterior tibial pulses are 0 on the right side and 0 on the left side. Pulmonary:      Effort: Pulmonary effort is normal.      Breath sounds: Normal breath sounds. Abdominal:      General: There is no distension. Palpations: Abdomen is soft. There is no mass. Musculoskeletal:         General: No tenderness. Normal range of motion. Cervical back: Normal range of motion. No rigidity. Right lower leg: Edema (Trace) present. Left lower leg: Edema (Trace) present. Skin:     General: Skin is warm and dry. Capillary Refill: Capillary refill takes 2 to 3 seconds. Neurological:      General: No focal deficit present. Mental Status: He is alert and oriented to person, place, and time. Sensory: No sensory deficit. Gait: Gait normal.   Psychiatric:         Mood and Affect: Mood normal.         Behavior: Behavior normal.           I have reviewed and made appropriate changes to the review of systems input by the medical assistant.     Vitals:    12/04/23 1000   BP: 122/78   BP Location: Left arm   Patient Position: Sitting   Cuff Size: Large   Pulse: 63   SpO2: 98%   Weight: 111 kg (245 lb 6.4 oz)   Height: 6' 5" (1.956 m)       Patient Active Problem List   Diagnosis    Inguinal hernia unilateral, non-recurrent    Allergic rhinitis    Benign prostatic hyperplasia with nocturia    Stage 3 chronic kidney disease (HCC)    Colon polyp    ED (erectile dysfunction) Gait disturbance    Hyperglycemia    Mixed hyperlipidemia    Essential hypertension    Left knee pain    Osteoarthritis    Health care maintenance    Tinnitus    Personal history of kidney cancer    Gilbert's syndrome    Acquired hypothyroidism    Abdominal aortic aneurysm (AAA) without rupture (HCC)    Paroxysmal atrial fibrillation (HCC)    Anticoagulated by anticoagulation treatment    Thoracic aortic aneurysm without rupture [facility])       Past Surgical History:   Procedure Laterality Date    APPENDECTOMY      COLONOSCOPY      CYSTOSCOPY      Diagnostic    HERNIA REPAIR Right 2016    Procedure: REPAIR HERNIA INGUINAL, LAPAROSCOPIC;  Surgeon: Luis Patel MD;  Location: [facility];  Service:     HIP ARTHROPLASTY Left     twice  and     [facility]  2016    description: laparoscopic right inguinal hernia repair with mesh     NEPHRECTOMY Right     due to a tumor    ROTATOR CUFF REPAIR Left     TOTAL HIP ARTHROPLASTY      TOTAL HIP ARTHROPLASTY Bilateral        Family History   Problem Relation Age of Onset    Heart attack Mother     Cancer Father        Social History     Socioeconomic History    Marital status: /Civil Union     Spouse name: Not on file    Number of children: Not on file    Years of education: Not on file    Highest education level: Not on file   Occupational History    Not on file   Tobacco Use    Smoking status: Former     Types: Cigarettes     Quit date: 2006     Years since quittin.3    Smokeless tobacco: Never   Vaping Use    Vaping Use: Never used   Substance and Sexual Activity    Alcohol use: Never    Drug use: No    Sexual activity: Not on file   Other Topics Concern    Not on file   Social History Narrative    Not on file     Social Determinants of Health     Financial Resource Strain: Low Risk  (2022)    Overall Financial Resource Strain (CARDIA)     Difficulty of Paying Living Expenses: Not hard at all   Food Insecurity: Not on file   Transportation Needs: No Transportation Needs (11/17/2022)    PRAPARE - Transportation     Lack of Transportation (Medical): No     Lack of Transportation (Non-Medical): No   Physical Activity: Not on file   Stress: Not on file   Social Connections: Not on file   Intimate Partner Violence: Not on file   Housing Stability: Not on file       Allergies   Allergen Reactions    Nsaids Other (See Comments)     Instructed to avoid all ibuprofen's/NSAIDS  as pt only has one kidney         Current Outpatient Medications:     atorvastatin (LIPITOR) 40 mg tablet, TAKE 1 TABLET DAILY WITH   DINNER, Disp: 90 tablet, Rfl: 3    irbesartan (AVAPRO) 150 mg tablet, Take 1 tablet (150 mg total) by mouth daily at bedtime, Disp: 90 tablet, Rfl: 1    Xarelto 20 MG tablet, TAKE 1 TABLET DAILY WITH   DINNER (Patient taking differently: Stopped 9/13 got approval from pcp will stop for 5 days. Prior to cataract sx.), Disp: 90 tablet, Rfl: 1    Multiple Vitamin (multivitamin) capsule, Take 1 capsule by mouth daily (Patient not taking: Reported on 12/4/2023), Disp: , Rfl:     polymyxin b-trimethoprim (POLYTRIM) ophthalmic solution, , Disp: , Rfl:     prednisoLONE acetate (PRED FORTE) 1 % ophthalmic suspension, , Disp: , Rfl:   I have spent a total time of 30 minutes on 12/04/23 in caring for this patient including Diagnostic results, Risks and benefits of tx options, Instructions for management, Patient and family education, Importance of tx compliance, Documenting in the medical record, Reviewing / ordering tests, medicine, procedures  , and Obtaining or reviewing history  .

## 2023-12-04 NOTE — PATIENT INSTRUCTIONS
-Complete abdominal ultrasound at 1 year and return to the office for review. Return sooner/ call the office if you experience any changes to your legs or feet such as new pain, redness,swelling, leg discoloration.    - Go to the ED/ Call 911:    -If you experience any of the following:  Sudden pain in your abdomen, groin, back, legs, or buttocks    Nausea and vomiting    A lump or swelling in your abdomen    Stiff abdominal muscles    Numbness or tingling in your legs    Pale, sweaty, or clammy skin    Dizziness, fainting or loss of consciousness    - Stay active. Exercise everyday. Walking is the recommended exercise, keep a log if possible.      -Continue to take your Atorvastatin, Xarelto (for A-fib) daily as prescribed by your PCP.

## 2023-12-04 NOTE — ASSESSMENT & PLAN NOTE
18-year-old male with PMHx hypothyroidism, HTN, A-fib (xarelto), thoracic aortic aneurysm, OA, CKD stage 3 s/p nephrectomy, BPH, HLD, gilbert's syndrome returns to the office for review of his AOIL with CARLENE's. AOIL from 11/21/23 demonstrates  The abdominal aortic 3.4 cm (Prior: 3.5 cm)  The right proximal EMMA is aneurysmal, 2.1 cm (prior 1.6 cm) . The left distal EMMA is aneurysmal, 2.2 cm (prior: 2.1). B/L CARLENE's are normal with non-compressible vessels    -Denies abdominal pain, abdominal mass.  -No self-limiting claudication, no rest pain, no wounds/ tissue loss. -Reviewed abdominal ultrasound in detail with pt and answered all questions. Discussed pathophysiology of arterial aneurysm, indications for treatment and conservative treatment measures. Criteria for intervention including size>=5.5 cm, growth >0.5cm in 6 months. Will continue with non-invasive surveillance yearly and medical management with atorvastatin. Pt verbalized understanding and is agreeable to the plan at this time. Recommendations  -Complete AOIL with ABIs in 1 year and return to the office for review.  -On Xarelto for A-fib Reports brusing, discussed if he were to be d/c'd off xarelto he should be placed on ASA/plavix. Reluctant to use ASA at this time d/t hx of nephrectomy. Pt verbalized understanding.  -Continue taking Atorvastatin daily as per PCP. -Go to the ED/ Call 911 with any s/s of ruptured AAA. -Call the office with any questions or concerns, new or worsening symptoms. Call the office for increase in leg pain, swelling, discoloration, new wounds.

## 2023-12-05 ENCOUNTER — APPOINTMENT (OUTPATIENT)
Dept: LAB | Facility: CLINIC | Age: 73
End: 2023-12-05
Payer: COMMERCIAL

## 2023-12-05 DIAGNOSIS — Z79.01 ANTICOAGULATED BY ANTICOAGULATION TREATMENT: ICD-10-CM

## 2023-12-05 DIAGNOSIS — I70.0 ABDOMINAL AORTIC ATHEROSCLEROSIS (HCC): ICD-10-CM

## 2023-12-05 DIAGNOSIS — R73.9 HYPERGLYCEMIA: ICD-10-CM

## 2023-12-05 DIAGNOSIS — K63.5 POLYP OF COLON, UNSPECIFIED PART OF COLON, UNSPECIFIED TYPE: ICD-10-CM

## 2023-12-05 DIAGNOSIS — R35.1 BENIGN PROSTATIC HYPERPLASIA WITH NOCTURIA: ICD-10-CM

## 2023-12-05 DIAGNOSIS — I71.20 THORACIC AORTIC ANEURYSM WITHOUT RUPTURE, UNSPECIFIED PART (HCC): ICD-10-CM

## 2023-12-05 DIAGNOSIS — N40.1 BENIGN PROSTATIC HYPERPLASIA WITH NOCTURIA: ICD-10-CM

## 2023-12-05 DIAGNOSIS — I71.40 ABDOMINAL AORTIC ANEURYSM (AAA) WITHOUT RUPTURE, UNSPECIFIED PART (HCC): ICD-10-CM

## 2023-12-05 DIAGNOSIS — E78.2 MIXED HYPERLIPIDEMIA: ICD-10-CM

## 2023-12-05 DIAGNOSIS — I48.91 NEW ONSET A-FIB (HCC): ICD-10-CM

## 2023-12-05 DIAGNOSIS — N18.31 STAGE 3A CHRONIC KIDNEY DISEASE (HCC): ICD-10-CM

## 2023-12-05 DIAGNOSIS — E03.9 ACQUIRED HYPOTHYROIDISM: ICD-10-CM

## 2023-12-05 DIAGNOSIS — I10 ESSENTIAL HYPERTENSION: ICD-10-CM

## 2023-12-05 LAB
25(OH)D3 SERPL-MCNC: 26.1 NG/ML (ref 30–100)
ALBUMIN SERPL BCP-MCNC: 4.1 G/DL (ref 3.5–5)
ALP SERPL-CCNC: 61 U/L (ref 34–104)
ALT SERPL W P-5'-P-CCNC: 17 U/L (ref 7–52)
ANION GAP SERPL CALCULATED.3IONS-SCNC: 10 MMOL/L
AST SERPL W P-5'-P-CCNC: 17 U/L (ref 13–39)
BILIRUB SERPL-MCNC: 1.1 MG/DL (ref 0.2–1)
BILIRUB UR QL STRIP: NEGATIVE
BUN SERPL-MCNC: 28 MG/DL (ref 5–25)
CALCIUM SERPL-MCNC: 9.5 MG/DL (ref 8.4–10.2)
CHLORIDE SERPL-SCNC: 107 MMOL/L (ref 96–108)
CHOLEST SERPL-MCNC: 120 MG/DL
CLARITY UR: CLEAR
CO2 SERPL-SCNC: 26 MMOL/L (ref 21–32)
COLOR UR: NORMAL
CREAT SERPL-MCNC: 2.07 MG/DL (ref 0.6–1.3)
ERYTHROCYTE [DISTWIDTH] IN BLOOD BY AUTOMATED COUNT: 11.9 % (ref 11.6–15.1)
EST. AVERAGE GLUCOSE BLD GHB EST-MCNC: 123 MG/DL
GFR SERPL CREATININE-BSD FRML MDRD: 30 ML/MIN/1.73SQ M
GLUCOSE P FAST SERPL-MCNC: 89 MG/DL (ref 65–99)
GLUCOSE UR STRIP-MCNC: NEGATIVE MG/DL
HBA1C MFR BLD: 5.9 %
HCT VFR BLD AUTO: 41 % (ref 36.5–49.3)
HDLC SERPL-MCNC: 40 MG/DL
HGB BLD-MCNC: 13.8 G/DL (ref 12–17)
HGB UR QL STRIP.AUTO: NEGATIVE
KETONES UR STRIP-MCNC: NEGATIVE MG/DL
LDLC SERPL CALC-MCNC: 52 MG/DL (ref 0–100)
LEUKOCYTE ESTERASE UR QL STRIP: NEGATIVE
MCH RBC QN AUTO: 30.9 PG (ref 26.8–34.3)
MCHC RBC AUTO-ENTMCNC: 33.7 G/DL (ref 31.4–37.4)
MCV RBC AUTO: 92 FL (ref 82–98)
NITRITE UR QL STRIP: NEGATIVE
PH UR STRIP.AUTO: 6.5 [PH]
PLATELET # BLD AUTO: 306 THOUSANDS/UL (ref 149–390)
PMV BLD AUTO: 9.3 FL (ref 8.9–12.7)
POTASSIUM SERPL-SCNC: 4.5 MMOL/L (ref 3.5–5.3)
PROT SERPL-MCNC: 6.1 G/DL (ref 6.4–8.4)
PROT UR STRIP-MCNC: NEGATIVE MG/DL
RBC # BLD AUTO: 4.46 MILLION/UL (ref 3.88–5.62)
SODIUM SERPL-SCNC: 143 MMOL/L (ref 135–147)
SP GR UR STRIP.AUTO: 1.02 (ref 1–1.03)
TRIGL SERPL-MCNC: 140 MG/DL
TSH SERPL DL<=0.05 MIU/L-ACNC: 3.23 UIU/ML (ref 0.45–4.5)
UROBILINOGEN UR STRIP-ACNC: <2 MG/DL
WBC # BLD AUTO: 8.64 THOUSAND/UL (ref 4.31–10.16)

## 2023-12-05 PROCEDURE — 80053 COMPREHEN METABOLIC PANEL: CPT

## 2023-12-05 PROCEDURE — 83036 HEMOGLOBIN GLYCOSYLATED A1C: CPT

## 2023-12-05 PROCEDURE — 80061 LIPID PANEL: CPT

## 2023-12-05 PROCEDURE — 81003 URINALYSIS AUTO W/O SCOPE: CPT

## 2023-12-05 PROCEDURE — 36415 COLL VENOUS BLD VENIPUNCTURE: CPT

## 2023-12-05 PROCEDURE — 85027 COMPLETE CBC AUTOMATED: CPT

## 2023-12-05 PROCEDURE — 84443 ASSAY THYROID STIM HORMONE: CPT

## 2023-12-05 PROCEDURE — 82306 VITAMIN D 25 HYDROXY: CPT

## 2023-12-12 ENCOUNTER — CLINICAL SUPPORT (OUTPATIENT)
Dept: CARDIOLOGY CLINIC | Facility: CLINIC | Age: 73
End: 2023-12-12
Payer: COMMERCIAL

## 2023-12-12 ENCOUNTER — OFFICE VISIT (OUTPATIENT)
Dept: FAMILY MEDICINE CLINIC | Facility: CLINIC | Age: 73
End: 2023-12-12
Payer: COMMERCIAL

## 2023-12-12 VITALS
WEIGHT: 245 LBS | DIASTOLIC BLOOD PRESSURE: 80 MMHG | HEART RATE: 76 BPM | BODY MASS INDEX: 28.93 KG/M2 | OXYGEN SATURATION: 98 % | SYSTOLIC BLOOD PRESSURE: 130 MMHG | HEIGHT: 77 IN | TEMPERATURE: 97.8 F

## 2023-12-12 DIAGNOSIS — N18.31 STAGE 3A CHRONIC KIDNEY DISEASE (HCC): ICD-10-CM

## 2023-12-12 DIAGNOSIS — I10 ESSENTIAL HYPERTENSION: ICD-10-CM

## 2023-12-12 DIAGNOSIS — I48.91 NEW ONSET ATRIAL FIBRILLATION (HCC): ICD-10-CM

## 2023-12-12 DIAGNOSIS — I48.0 PAROXYSMAL ATRIAL FIBRILLATION (HCC): ICD-10-CM

## 2023-12-12 DIAGNOSIS — R73.9 HYPERGLYCEMIA: ICD-10-CM

## 2023-12-12 DIAGNOSIS — K63.5 POLYP OF COLON, UNSPECIFIED PART OF COLON, UNSPECIFIED TYPE: Primary | ICD-10-CM

## 2023-12-12 DIAGNOSIS — E03.9 ACQUIRED HYPOTHYROIDISM: ICD-10-CM

## 2023-12-12 DIAGNOSIS — N40.1 BENIGN PROSTATIC HYPERPLASIA WITH NOCTURIA: ICD-10-CM

## 2023-12-12 DIAGNOSIS — E78.2 MIXED HYPERLIPIDEMIA: ICD-10-CM

## 2023-12-12 DIAGNOSIS — I71.40 ABDOMINAL AORTIC ANEURYSM (AAA) WITHOUT RUPTURE, UNSPECIFIED PART (HCC): ICD-10-CM

## 2023-12-12 DIAGNOSIS — R35.1 BENIGN PROSTATIC HYPERPLASIA WITH NOCTURIA: ICD-10-CM

## 2023-12-12 DIAGNOSIS — Z00.00 HEALTH CARE MAINTENANCE: ICD-10-CM

## 2023-12-12 DIAGNOSIS — Z85.528 PERSONAL HISTORY OF KIDNEY CANCER: ICD-10-CM

## 2023-12-12 DIAGNOSIS — I71.21 ANEURYSM OF ASCENDING AORTA WITHOUT RUPTURE (HCC): ICD-10-CM

## 2023-12-12 DIAGNOSIS — E55.9 VITAMIN D DEFICIENCY: ICD-10-CM

## 2023-12-12 DIAGNOSIS — Z79.01 ANTICOAGULATED BY ANTICOAGULATION TREATMENT: ICD-10-CM

## 2023-12-12 PROCEDURE — G0439 PPPS, SUBSEQ VISIT: HCPCS | Performed by: FAMILY MEDICINE

## 2023-12-12 PROCEDURE — 93248 EXT ECG>7D<15D REV&INTERPJ: CPT | Performed by: STUDENT IN AN ORGANIZED HEALTH CARE EDUCATION/TRAINING PROGRAM

## 2023-12-12 PROCEDURE — 99214 OFFICE O/P EST MOD 30 MIN: CPT | Performed by: FAMILY MEDICINE

## 2023-12-12 RX ORDER — CHOLECALCIFEROL (VITAMIN D3) 50 MCG
2000 TABLET ORAL DAILY
COMMUNITY

## 2023-12-12 NOTE — PROGRESS NOTES
Assessment and Plan:     1. Colon polyp refer back to Dr. Elisabeth Holley patient is due for colonoscopy  2. Pain hypothyroidism, stable off patient continue to monitor  3. Aneurysm and ascending aorta, stable follows with CT surgery  4. PAF/anticoagulation, stable to follow with cardiology  5. Abdominal aortic aneurysm, asymptomatic follow vascular surgery  6. CKD-3, GFR has fallen from 47 down to 30 repeat in 1 month patient to follow-up with nephrology  7. History of renal cancer status post right nephrectomy 2006 follows with urology  8. Hyperlipidemia continue atorvastatin  9. Hyperglycemia diet controlled  10. Healthcare maintenance Medicare [place] completed, patient received influenza and COVID vaccines at [facility]  11. Vitamin D deficient start vitamin D 2000's daily #12.   Patient to return in 6 months for office visit and blood work sooner if needed        Problem List Items Addressed This Visit        Digestive    Colon polyp - Primary     Refer back to Dr. Hunter Quna Referral to Colorectal Surgery    CBC    Comprehensive metabolic panel    Hemoglobin A1C    Lipid Panel with Direct LDL reflex    PSA Total, Diagnostic    TSH, 3rd generation with Free T4 reflex    UA (URINE) with reflex to Scope    Vitamin D 25 hydroxy       Endocrine    Acquired hypothyroidism     Stable off medication continue to monitor         Relevant Orders    CBC    Comprehensive metabolic panel    Hemoglobin A1C    Lipid Panel with Direct LDL reflex    PSA Total, Diagnostic    TSH, 3rd generation with Free T4 reflex    UA (URINE) with reflex to Scope    Vitamin D 25 hydroxy       Cardiovascular and Mediastinum    Essential hypertension     Stable continue present therapy         Relevant Orders    CBC    Comprehensive metabolic panel    Hemoglobin A1C    Lipid Panel with Direct LDL reflex    PSA Total, Diagnostic    TSH, 3rd generation with Free T4 reflex    UA (URINE) with reflex to Scope    Vitamin D 25 hydroxy    Abdominal aortic aneurysm (AAA) without rupture ([address])     Asymptomatic follow with vascular surgery         Relevant Orders    CBC    Comprehensive metabolic panel    Hemoglobin A1C    Lipid Panel with Direct LDL reflex    PSA Total, Diagnostic    TSH, 3rd generation with Free T4 reflex    UA (URINE) with reflex to Scope    Vitamin D 25 hydroxy    Paroxysmal atrial fibrillation (HCC)     Stable continue present therapy, follow with cardiology         Relevant Orders    CBC    Comprehensive metabolic panel    Hemoglobin A1C    Lipid Panel with Direct LDL reflex    PSA Total, Diagnostic    TSH, 3rd generation with Free T4 reflex    UA (URINE) with reflex to Scope    Vitamin D 25 hydroxy    Thoracic aortic aneurysm without rupture ([address])     Asymptomatic patient follows with thoracic surgery         Relevant Orders    CBC    Comprehensive metabolic panel    Hemoglobin A1C    Lipid Panel with Direct LDL reflex    PSA Total, Diagnostic    TSH, 3rd generation with Free T4 reflex    UA (URINE) with reflex to Scope    Vitamin D 25 hydroxy       Genitourinary    Stage 3 chronic kidney disease ([address])     Lab Results   Component Value Date    EGFR 30 12/05/2023    EGFR 47 05/24/2023    EGFR 51 11/10/2022    CREATININE 2.07 (H) 12/05/2023    CREATININE 1.46 (H) 05/24/2023    CREATININE 1.37 (H) 11/10/2022   With nephrology GFR has dropped from 47-30 we will repeat BMP in 1 month         Relevant Orders    Basic metabolic panel    CBC    Comprehensive metabolic panel    Hemoglobin A1C    Lipid Panel with Direct LDL reflex    PSA Total, Diagnostic    TSH, 3rd generation with Free T4 reflex    UA (URINE) with reflex to Scope    Vitamin D 25 hydroxy       Other    Benign prostatic hyperplasia with nocturia     PSA ordered         Relevant Orders    CBC    Comprehensive metabolic panel    Hemoglobin A1C    Lipid Panel with Direct LDL reflex    PSA Total, Diagnostic    TSH, 3rd generation with Free T4 reflex    UA (URINE) with reflex to Scope    Vitamin D 25 hydroxy    Hyperglycemia     Diet controlled         Relevant Orders    CBC    Comprehensive metabolic panel    Hemoglobin A1C    Lipid Panel with Direct LDL reflex    PSA Total, Diagnostic    TSH, 3rd generation with Free T4 reflex    UA (URINE) with reflex to Scope    Vitamin D 25 hydroxy    Mixed hyperlipidemia     Continue atorvastatin 40 mg daily         Relevant Orders    CBC    Comprehensive metabolic panel    Hemoglobin A1C    Lipid Panel with Direct LDL reflex    PSA Total, Diagnostic    TSH, 3rd generation with Free T4 reflex    UA (URINE) with reflex to Scope    Vitamin D 25 hydroxy    Health care maintenance     Medicare AWV completed, received influenza and COVID vaccines at [address]         Relevant Orders    CBC    Comprehensive metabolic panel    Hemoglobin A1C    Lipid Panel with Direct LDL reflex    PSA Total, Diagnostic    TSH, 3rd generation with Free T4 reflex    UA (URINE) with reflex to Scope    Vitamin D 25 hydroxy    Personal history of kidney cancer     This post right nephrectomy 2006         Relevant Orders    CBC    Comprehensive metabolic panel    Hemoglobin A1C    Lipid Panel with Direct LDL reflex    PSA Total, Diagnostic    TSH, 3rd generation with Free T4 reflex    UA (URINE) with reflex to Scope    Vitamin D 25 hydroxy    Anticoagulated by anticoagulation treatment     Continue Xarelto         Relevant Orders    CBC    Comprehensive metabolic panel    Hemoglobin A1C    Lipid Panel with Direct LDL reflex    PSA Total, Diagnostic    TSH, 3rd generation with Free T4 reflex    UA (URINE) with reflex to Scope    Vitamin D 25 hydroxy    Vitamin D deficiency     Start vitamin D 2000 units daily         Relevant Orders    CBC    Comprehensive metabolic panel    Hemoglobin A1C    Lipid Panel with Direct LDL reflex    PSA Total, Diagnostic    TSH, 3rd generation with Free T4 reflex    UA (URINE) with reflex to Scope    Vitamin D 25 hydroxy   Other Visit Diagnoses     New onset atrial fibrillation (HCC)        Relevant Medications    rivaroxaban (Xarelto) 20 mg tablet            Depression Screening and Follow-up Plan: Patient was screened for depression during today's encounter. They screened negative with a PHQ-2 score of 0. Preventive health issues were discussed with patient, and age appropriate screening tests were ordered as noted in patient's After Visit Summary. Personalized health advice and appropriate referrals for health education or preventive services given if needed, as noted in patient's After Visit Summary. History of Present Illness:     Patient presents for a Medicare Wellness Visit    Patient doing well. No new medical complaints or concerns. Blood work was discussed       Patient Care Team:  Hellen Feng DO as PCP - General (Family Medicine)  Kyaw Turner PA-C (Inactive) as Physician Assistant (Vascular Surgery)  Brennan Paiz MD (Vascular Surgery)     Review of Systems:     Review of Systems   Constitutional: Negative. HENT: Negative. Eyes: Negative. Respiratory: Negative. Cardiovascular: Negative. Gastrointestinal: Negative. Endocrine: Negative. Genitourinary: Negative. Musculoskeletal: Negative. Skin: Negative. Allergic/Immunologic: Negative. Neurological: Negative. Hematological: Negative. Psychiatric/Behavioral: Negative.           Problem List:     Patient Active Problem List   Diagnosis   • Inguinal hernia unilateral, non-recurrent   • Allergic rhinitis   • Benign prostatic hyperplasia with nocturia   • Stage 3 chronic kidney disease (HCC)   • Colon polyp   • ED (erectile dysfunction)   • Gait disturbance   • Hyperglycemia   • Mixed hyperlipidemia   • Essential hypertension   • Left knee pain   • Osteoarthritis   • Health care maintenance   • Tinnitus   • Personal history of kidney cancer   • Gilbert's syndrome   • Acquired hypothyroidism   • Abdominal aortic aneurysm (AAA) without rupture (HCC)   • Paroxysmal atrial fibrillation (HCC)   • Anticoagulated by anticoagulation treatment   • Thoracic aortic aneurysm without rupture [facility])   • Vitamin D deficiency      Past Medical and Surgical History:     Past Medical History:   Diagnosis Date   • Arthritis    • Cancer [facility])     right kidney    • Cellulitis of left leg     2016   • Chronic kidney disease    • Exercises 3 to 4 times per week    • Full dentures    • History of colonoscopy    • Hypertension    • Inguinal hernia, right     last assessed: 2016   • Malignant neoplasm of kidney ([address])     unspecified laterality Managed by Carine Sales MD Last assessed: 3/1/2016   • Renal cell carcinoma ([address])    • Seasonal allergies    • Thrombosis     superficial left leg 2016   • Varicose veins of both lower extremities      Past Surgical History:   Procedure Laterality Date   • APPENDECTOMY     • COLONOSCOPY     • CYSTOSCOPY      Diagnostic   • HERNIA REPAIR Right 2016    Procedure: REPAIR HERNIA INGUINAL, LAPAROSCOPIC;  Surgeon: Martin Neely MD;  Location: [facility];  Service:    • HIP ARTHROPLASTY Left     twice  and    • HIP ARTHROPLASTY Right        • LAPAROSCOPIC INGUINAL HERNIA REPAIR  2016    description: laparoscopic right inguinal hernia repair with mesh    • NEPHRECTOMY Right     due to a tumor   • ROTATOR CUFF REPAIR Left    • TOTAL HIP ARTHROPLASTY     • TOTAL HIP ARTHROPLASTY Bilateral       Family History:     Family History   Problem Relation Age of Onset   • Heart attack Mother    • Cancer Father       Social History:     Social History     Socioeconomic History   • Marital status: /Civil Union     Spouse name: None   • Number of children: None   • Years of education: None   • Highest education level: None   Occupational History   • None   Tobacco Use   • Smoking status: Former     Types: Cigarettes     Quit date: 2006     Years since quittin.3   • Smokeless tobacco: Never   Vaping Use   • Vaping Use: Never used   Substance and Sexual Activity   • Alcohol use: Never   • Drug use: No   • Sexual activity: None   Other Topics Concern   • None   Social History Narrative   • None     Social Determinants of Health     Financial Resource Strain: Low Risk  (12/12/2023)    Overall Financial Resource Strain (CARDIA)    • Difficulty of Paying Living Expenses: Not hard at all   Food Insecurity: Not on file   Transportation Needs: No Transportation Needs (12/12/2023)    PRAPARE - Transportation    • Lack of Transportation (Medical): No    • Lack of Transportation (Non-Medical): No   Physical Activity: Not on file   Stress: Not on file   Social Connections: Not on file   Intimate Partner Violence: Not on file   Housing Stability: Not on file      Medications and Allergies:     Current Outpatient Medications   Medication Sig Dispense Refill   • atorvastatin (LIPITOR) 40 mg tablet TAKE 1 TABLET DAILY WITH   DINNER 90 tablet 3   • Cholecalciferol (Vitamin D) 50 MCG (2000 UT) tablet Take 2,000 Units by mouth daily     • irbesartan (AVAPRO) 150 mg tablet Take 1 tablet (150 mg total) by mouth daily at bedtime 90 tablet 1   • rivaroxaban (Xarelto) 20 mg tablet Take 1 tablet (20 mg total) by mouth daily with dinner 90 tablet 1     No current facility-administered medications for this visit.      Allergies   Allergen Reactions   • Nsaids Other (See Comments)     Instructed to avoid all ibuprofen's/NSAIDS  as pt only has one kidney      Immunizations:     Immunization History   Administered Date(s) Administered   • COVID-19 MODERNA VACC 0.5 ML IM 03/17/2021, 04/14/2021   • INFLUENZA 10/10/2019   • Influenza Split High Dose Preservative Free IM 11/24/2015, 11/08/2016, 09/20/2017   • Influenza, high dose seasonal 0.7 mL 10/05/2018, 10/08/2020, 10/01/2021   • Influenza, injectable, quadrivalent, preservative free 0.5 mL 10/01/2019   • Influenza, seasonal, injectable 12/19/2011, 12/31/2013   • Pneumococcal Conjugate 13-Valent 03/06/2017   • Pneumococcal Polysaccharide PPV23 12/11/2014   • Td (adult), adsorbed 01/01/2011   • Tdap 03/06/2017   • Zoster 02/26/2013      Health Maintenance:         Topic Date Due   • Colorectal Cancer Screening  11/16/2023   • Hepatitis C Screening  Completed         Topic Date Due   • COVID-19 Vaccine (3 - Moderna series) 06/09/2021   • Influenza Vaccine (1) 09/01/2023      Medicare Screening Tests and Risk Assessments:     Prabhakar George is here for his Subsequent Wellness visit. Health Risk Assessment:   Patient rates overall health as fair. Patient feels that their physical health rating is same. Patient is satisfied with their life. Eyesight was rated as same. Hearing was rated as same. Patient feels that their emotional and mental health rating is same. Patients states they are sometimes angry. Patient states they are never, rarely unusually tired/fatigued. Pain experienced in the last 7 days has been none. Patient states that he has experienced no weight loss or gain in last 6 months. Depression Screening:   PHQ-2 Score: 0      Fall Risk Screening: In the past year, patient has experienced: no history of falling in past year      Home Safety:  Patient does not have trouble with stairs inside or outside of their home. Patient has working smoke alarms and has working carbon monoxide detector. Home safety hazards include: none. Nutrition:   Current diet is Regular. Medications:   Patient is currently taking over-the-counter supplements. OTC medications include: see medication list. Patient is able to manage medications. Activities of Daily Living (ADLs)/Instrumental Activities of Daily Living (IADLs):   Walk and transfer into and out of bed and chair?: Yes  Dress and groom yourself?: Yes    Bathe or shower yourself?: Yes    Feed yourself?  Yes  Do your laundry/housekeeping?: Yes  Manage your money, pay your bills and track your expenses?: Yes  Make your own meals?: Yes    Do your own shopping?: Yes    Previous Hospitalizations:   Any hospitalizations or ED visits within the last 12 months?: No      Advance Care Planning:   Living will: Yes    Durable POA for healthcare: No    Advanced directive: Yes    Advanced directive counseling given: Yes    Five wishes given: No    Patient declined ACP directive: No    End of Life Decisions reviewed with patient: Yes    Provider agrees with end of life decisions: Yes      Cognitive Screening:   Provider or family/friend/caregiver concerned regarding cognition?: No    PREVENTIVE SCREENINGS      Cardiovascular Screening:    General: Screening Not Indicated and History Lipid Disorder      Diabetes Screening:     General: Screening Current      Colorectal Cancer Screening:     General: Screening Current      Prostate Cancer Screening:    General: Risks and Benefits Discussed    Due for: PSA      Osteoporosis Screening:    General: Screening Not Indicated      Abdominal Aortic Aneurysm (AAA) Screening:    Risk factors include: age between 70-75 yo and tobacco use        General: Screening Not Indicated and History AAA      Lung Cancer Screening:     General: Screening Not Indicated      Hepatitis C Screening:    General: Screening Current    Screening, Brief Intervention, and Referral to Treatment (SBIRT)    Screening  Typical number of drinks in a day: 0    Single Item Drug Screening:  How often have you used an illegal drug (including marijuana) or a prescription medication for non-medical reasons in the past year? never    Single Item Drug Screen Score: 0  Interpretation: Negative screen for possible drug use disorder    No results found. Physical Exam:     /80 (BP Location: Right arm, Patient Position: Sitting, Cuff Size: Adult)   Pulse 76   Temp 97.8 °F (36.6 °C) (Tympanic)   Ht 6' 5" (1.956 m)   Wt 111 kg (245 lb)   SpO2 98%   BMI 29.05 kg/m²     Physical Exam  Vitals and nursing note reviewed.    Constitutional: Appearance: Normal appearance. HENT:      Head: Normocephalic and atraumatic. Mouth/Throat:      Mouth: Mucous membranes are moist.   Eyes:      General: No scleral icterus. Neck:      Vascular: No carotid bruit. Cardiovascular:      Rate and Rhythm: Normal rate and regular rhythm. Heart sounds: Normal heart sounds. Pulmonary:      Effort: Pulmonary effort is normal.      Breath sounds: Normal breath sounds. Abdominal:      General: Bowel sounds are normal.      Palpations: Abdomen is soft. Tenderness: There is no abdominal tenderness. Musculoskeletal:      Cervical back: Neck supple. Right lower leg: No edema. Left lower leg: No edema. Skin:     General: Skin is warm and dry. Neurological:      General: No focal deficit present. Mental Status: He is alert.    Psychiatric:         Mood and Affect: Mood normal.          Alex Maradiaga DO

## 2023-12-12 NOTE — ASSESSMENT & PLAN NOTE
Lab Results   Component Value Date    EGFR 30 12/05/2023    EGFR 47 05/24/2023    EGFR 51 11/10/2022    CREATININE 2.07 (H) 12/05/2023    CREATININE 1.46 (H) 05/24/2023    CREATININE 1.37 (H) 11/10/2022   With nephrology GFR has dropped from 47-30 we will repeat BMP in 1 month

## 2023-12-12 NOTE — PATIENT INSTRUCTIONS
Start vitamin D 2000 units daily  Main well-hydrated  Repeat kidney function test in 4 weeks  Follow with nephrology for low kidney function  Follow with all specialist further instructions  Return in 6 months for office visit and blood work sooner if needed

## 2024-01-10 NOTE — ASSESSMENT & PLAN NOTE
Lab Results   Component Value Date    EGFR 45 01/05/2022    EGFR 46 09/24/2021    EGFR 46 03/18/2021    CREATININE 1 51 (H) 01/05/2022    CREATININE 1 50 (H) 09/24/2021    CREATININE 1 51 (H) 03/18/2021   Stable on Arb Patient : Cisco Hodge Age: 64 year old Sex: female   MRN: [id number] Encounter Date: 1/10/2024      History     Chief Complaint   Patient presents with    Chest Pain    Shortness of Breath     HPI  Cisco Hodge is a 64 year old female who presents with right sided chest pressure that onset 5-10 days ago. She states when she lays down she feels like she cannot breathe. The patient also reports a tightness across her chest and into her right shoulder. The pain is intermittent, it occurs every few minutes and lasts for a few minutes before resolving, she states nothing seems to make it worse. She states the pain is intermittently pleuritic. Patient last had the pain PTA, no pain currently. She also notes a mild cough. No sore throat, ear pain, weight gain, abdominal pain, or leg swelling. The patient notes 3-4 weeks ago she fell and injured her right arm, she noted some bruising to her elbow after the fall.       Allergies   Allergen Reactions    Codeine DIZZINESS       Discharge Medication List as of 1/10/2024  6:22 PM        Prior to Admission Medications    Details   gabapentin (NEURONTIN) 100 MG capsule TAKE TWO CAPSULES BY MOUTH IN THE MORNING & TAKE TWO CAPSULES BY MOUTH IN THE EVENING (DO NOT START BEFORE OCTOBER 5, 2023) (VIAL)Historical Med      diazePAM (VALIUM) 5 MG tablet Delegates - In compliance with state law, the Prescription Drug Monitoring Program must be reviewed prior to signing any order for a controlled substance. PDMP Reviewed by Delegate - No Unexpected ActivityTake 1 tablet by mouth every 12 hours as needed  for Anxiety or Muscle spasms.Eprescribe, Disp-30 tablet, R-1This is a dose increase.      lurasidone (LATUDA) 80 MG tablet Take 1 tablet by mouth daily (with breakfast).Eprescribe, Disp-30 tablet, R-1This is a dose increase.      metOLazone (ZAROXOLYN) 2.5 MG tablet TAKE 1 TABLET BY MOUTH ONCE WEEKLYEprescribe, Disp-12 tablet, R-1      Carboxymethylcellulose Sodium (ARTIFICIAL TEARS OP)  Apply to eye as needed.Historical Med      ARTIFICIAL TEAR SOLUTION OP Place 1 drop into both eyes in the morning and 1 drop at noon and 1 drop in the evening.Historical Med      pantoprazole (PROTONIX) 40 MG tablet Take 1 tablet by mouth in the morning and 1 tablet in the evening. Take before meals.Eprescribe, Disp-180 tablet, R-4      nitroGLYCERIN (NITROSTAT) 0.4 MG sublingual tablet Place 1 tablet under the tongue every 5 minutes as needed for Chest pain.Eprescribe, Disp-25 tablet, R-10.3 mg not available.      atorvastatin (LIPITOR) 40 MG tablet Take 1 tablet by mouth daily.Eprescribe, Disp-90 tablet, R-4      furosemide (LASIX) 40 MG tablet Take 1 tablet by mouth 2 times daily.Eprescribe, Disp-180 tablet, R-4      meclizine (ANTIVERT) 25 MG tablet Take 1 tablet by mouth in the morning and 1 tablet in the evening.Eprescribe, Disp-180 tablet, R-4This is a dose adjustment.      ondansetron (ZOFRAN ODT) 4 MG disintegrating tablet Place 1 tablet onto the tongue every 12 hours as needed for Nausea.Eprescribe, Disp-30 tablet, R-13      potassium CHLORIDE (KLOR-CON M) 20 MEQ aileen ER tablet Take 1 tablet by mouth in the morning and 1 tablet in the evening.Eprescribe, Disp-180 tablet, R-4      albuterol 108 (90 Base) MCG/ACT inhaler Inhale 2 puffs into the lungs every 4 hours as needed for Shortness of Breath or Wheezing.Eprescribe, Disp-1 each, R-13      albuterol-ipratropium (Combivent Respimat) 100-20 MCG/ACT inhaler Inhale 1 puff into the lungs every 4 hours as needed for Shortness of Breath.Eprescribe, Disp-4 g, R-13      DISPENSE TENS unitsNormal, Disp-2 each, R-1      ammonium lactate (LAC-HYDRIN) 12 % cream Apply to affected area on the feet once a day as needed.Disp-385 g, R-2, Eprescribe      polyethylene glycol (GLYCOLAX, MIRALAX) packet Take 17 g by mouth daily as needed.Historical Med      linaclotide (LINZESS) 145 MCG capsule Take 1 capsule by mouth daily (before breakfast).Eprescribe, Disp-30 capsule, R-5       Omega-3 Fatty Acids ([employer] FISH OIL) 1200 MG Cap Take 1,200 mg by mouth daily.Historical Med      Cholecalciferol (VITAMIN D3) 2000 units Tab Take 2,000 Units by mouth daily.Historical Med, Oral, DAILYPer the FDA, the units of measure for vitamin D have changed from international units (IUs) to metric units (eg, micrograms or milligrams). It is advised to order in metric units. 50 mcg = 2,00 0 units      aspirin 81 MG tablet Take 81 mg by mouth daily.Historical Med      Calcium Citrate-Vitamin D 500-500 MG-UNIT Chew Tab Chew 500 mg by mouth 3 times daily.Eprescribe, Disp-90 tablet, R-12, MARIANO      albuterol (VENTOLIN) (2.5 MG/3ML) 0.083% nebulizer solution Take 3 mLs by nebulization every 6 hours as needed for Wheezing.Eprescribe, Nebulization, EVERY 6 HOURS PRN, Disp-375 mL, R-12Each vial = 3 mL (2.5 mg). Each box = 25 vials (75 mL).      Enter dispense quantity of 75 mL per box.     375 mL = QS for 30  days with Q6H dosing.             Past Medical History:   Diagnosis Date    Abnormality of hypoglossal nerve 9/18/2019    S/p stimulator placement    Cataract     Chronic constipation 5/21/2019    Chronic pain of right knee 2/15/2017    Chronic pain syndrome     back and neck    COPD (chronic obstructive pulmonary disease) (CMD)     last saw [facility] pulmonary specialist 10/17/17    CRI (chronic renal insufficiency), stage 2 (mild)     CSA (central sleep apnea) 1/7/2016    Dyslipidemia     Facial paralysis/Wilson palsy     Family history of diabetes mellitus in first degree relative     mother and sisters x2    Fibromyalgia     Generalized anxiety disorder 10/13/2014    GERD (gastroesophageal reflux disease)     resolved with weight loss ( ~ 60#'s )    Hemifacial spasm of left side of face     History of adenomatous polyp of colon     Incontinence     Leg edema     Major depressive disorder, recurrent episode, in partial remission (CMD) 10/13/2014    Medication management 9/18/2019    Obesity (BMI 30-39.9) 10/11/2018     BECCA (obstructive sleep apnea) 1/17/2019    has a machine     Osteopenia     bone density 4/29/16    Peripheral neuropathy, idiopathic 4/26/2016    Primary osteoarthritis involving multiple joints     Restless legs syndrome (RLS)     Shoulder impingement, left 8/14/2017    Strain of muscle, fascia and tendon of the posterior muscle group at thigh level, right thigh, initial encounter 3/14/2019    Tobacco use     1/2PPD since age 15    Vitamin D deficiency 11/13/2018       Past Surgical History:   Procedure Laterality Date    ANES TONSILLECTOMY PRIM/SECNDRY; UNDER A      BOTOX PRODUCT 100 U Left     ongoing BOTOX injections for rowdy facial spasms    BUNIONECTOMY      both    MORENO PROCEDURE  08/12/1998    Dr. Parks at [facility]    CARDIOLOGY - STRESS TEST  10/14/2015    Normal    CHOLECYSTECTOMY      COLONOSCOPY  10/21/13, 8/25/16    1 polyp (tubular adenoma), recommended repeat 1 year (2017)    COLONOSCOPY  07/25/2017    COLONOSCOPY W/ BIOPSIES  01/08/2020    Repeat in 5 years - Dr. Luna    COLONOSCOPY W/ BIOPSIES AND POLYPECTOMY  08/27/2009    COLONOSCOPY W/ POLYPECTOMY  06/06/2018    Repeat in 5 years - Dr. Luna    COLPOPEXY VAG INTRA DAVIN APRCH  06/26/2018    Dr. Saavedra    COMBINED ANTEROPOSTERIOR COLPORRHAPHY WITH CYSTO  06/26/2018    Dr. Saavedra    EGD  01/06/2023    ESOPHAGOGASTRODUODENOSCOPY  11/30/2016    ESOPHAGOGASTRODUODENOSCOPY  01/08/2020    FEV1/FVC  10/2016    [facility]    FOOT SURGERY Left 11/06/2015    Exostectomy 5th toe    KIDNEY STONE SURGERY      KNEE SCOPE,CLEAN/DRAIN Left     RI NERVE STIMULATOR FOR TX N&V  01/2019    hypoglossal nerve stimulator    ROTATOR CUFF REPAIR Left 10/20/2017    [facility]    SLING OPER STRES INCONTINENCE  06/26/2018    Dr. Saavedra: TVT sling    TUBAL LIGATION  08/12/1998    Dr. Parks at [facility]    VAGINAL HYSTERECTOMY  06/26/2018    Dr. Saavedra       Family History   Problem Relation Age of Onset    Diabetes Mother     Obesity Mother     Diabetes Father      Emphysema Father     Hypertension Father     Congestive Heart Failure Father     COPD Father     COPD Sister     Depression Sister     Hypertension Sister     Kidney disease Sister     Thyroid Sister     Obesity Sister     Diabetes Sister     Patient is unaware of any medical problems Brother     Patient is unaware of any medical problems Brother     Patient is unaware of any medical problems Brother        Social History     Tobacco Use    Smoking status: Every Day     Current packs/day: 1.00     Average packs/day: 1 pack/day for 43.0 years (43.0 ttl pk-yrs)     Types: Cigarettes    Smokeless tobacco: Never    Tobacco comments:     Pt. refuses counsling to quit smoking    Vaping Use    Vaping Use: Former   Substance Use Topics    Alcohol use: No     Comment: NONE / Social    Drug use: No       E-cigarette/Vaping    E-Cigarette/Vaping Use Former User      E-Cigarette/Vaping Substances & Devices       Review of Systems   Constitutional:  Negative for fever and unexpected weight change.   HENT:  Negative for ear pain and sore throat.    Respiratory:  Positive for cough, chest tightness and shortness of breath.    Cardiovascular:  Positive for chest pain. Negative for leg swelling.   Gastrointestinal:  Negative for abdominal pain, diarrhea, nausea and vomiting.   Genitourinary:  Negative for dysuria.   Musculoskeletal:  Negative for arthralgias.        Right shoulder pain.    Skin:  Negative for rash.   Neurological:  Negative for dizziness and headaches.   Psychiatric/Behavioral:  Negative for confusion.    All other systems reviewed and are negative.      Physical Exam     ED Triage Vitals   ED Triage Vitals Group      Temp 01/10/24 1610 98.2 °F (36.8 °C)      Heart Rate 01/10/24 1609 (!) 56      Resp 01/10/24 1609 20      BP 01/10/24 1609 (!) 193/93      SpO2 01/10/24 1609 99 %      EtCO2 mmHg --       Height 01/10/24 1609 5' 4\" (1.626 m)      Weight 01/10/24 1609 171 lb 1.2 oz (77.6 kg)      Weight Scale Used  01/10/24 1609 Standing scale      BMI (Calculated) 01/10/24 1609 29.36      IBW/kg (Calculated) 01/10/24 1609 54.7       Physical Exam  Vitals and nursing note reviewed.   Constitutional:       Appearance: She is well-developed.      Comments: No acute distress   HENT:      Head: Normocephalic and atraumatic.      Nose: Nose normal.   Eyes:      General: No scleral icterus.        Right eye: No discharge.         Left eye: No discharge.   Neck:      Trachea: No tracheal deviation.   Cardiovascular:      Rate and Rhythm: Normal rate and regular rhythm.      Pulses:           Radial pulses are 2+ on the right side and 2+ on the left side.   Pulmonary:      Effort: Pulmonary effort is normal. No respiratory distress.      Breath sounds: Normal breath sounds. No stridor.   Chest:      Comments: No tenderness to palpation.   Abdominal:      General: There is no distension.      Palpations: Abdomen is soft.      Tenderness: There is no abdominal tenderness.   Musculoskeletal:         General: Normal range of motion.      Cervical back: Normal range of motion.   Skin:     General: Skin is warm and dry.   Neurological:      Mental Status: She is alert and oriented to person, place, and time.      Cranial Nerves: No cranial nerve deficit.   Psychiatric:         Behavior: Behavior normal.         ED Course     Procedures    Lab Results     Results for orders placed or performed during the hospital encounter of 01/10/24   Comprehensive Metabolic Panel   Result Value Ref Range    Fasting Status      Sodium 138 135 - 145 mmol/L    Potassium 3.6 3.4 - 5.1 mmol/L    Chloride 109 97 - 110 mmol/L    Carbon Dioxide 29 21 - 32 mmol/L    Anion Gap 4 (L) 7 - 19 mmol/L    Glucose 90 70 - 99 mg/dL    BUN 11 6 - 20 mg/dL    Creatinine 0.80 0.51 - 0.95 mg/dL    Glomerular Filtration Rate 82 >=60    BUN/Cr 14 7 - 25    Calcium 9.1 8.4 - 10.2 mg/dL    Bilirubin, Total 0.3 0.2 - 1.0 mg/dL    GOT/AST 15 <=37 Units/L    GPT/ALT 16 <64 Units/L     Alkaline Phosphatase 70 45 - 117 Units/L    Albumin 4.1 3.6 - 5.1 g/dL    Protein, Total 7.5 6.4 - 8.2 g/dL    Globulin 3.4 2.0 - 4.0 g/dL    A/G Ratio 1.2 1.0 - 2.4   TROPONIN I, HIGH SENSITIVITY   Result Value Ref Range    Troponin I, High Sensitivity 7 <52 ng/L   NT proBNP   Result Value Ref Range    NT-proBNP 349 (H) <=125 pg/mL   CBC with Automated Differential (performable only)   Result Value Ref Range    WBC 8.5 4.2 - 11.0 K/mcL    RBC 4.19 4.00 - 5.20 mil/mcL    HGB 13.5 12.0 - 15.5 g/dL    HCT 40.8 36.0 - 46.5 %    MCV 97.4 78.0 - 100.0 fl    MCH 32.2 26.0 - 34.0 pg    MCHC 33.1 32.0 - 36.5 g/dL    RDW-CV 12.4 11.0 - 15.0 %    RDW-SD 44.6 39.0 - 50.0 fL     140 - 450 K/mcL    NRBC 0 <=0 /100 WBC    Neutrophil, Percent 60 %    Lymphocytes, Percent 33 %    Mono, Percent 5 %    Eosinophils, Percent 1 %    Basophils, Percent 1 %    Immature Granulocytes 0 %    Absolute Neutrophils 5.1 1.8 - 7.7 K/mcL    Absolute Lymphocytes 2.8 1.0 - 4.0 K/mcL    Absolute Monocytes 0.4 0.3 - 0.9 K/mcL    Absolute Eosinophils  0.1 0.0 - 0.5 K/mcL    Absolute Basophils 0.1 0.0 - 0.3 K/mcL    Absolute Immature Granulocytes 0.0 0.0 - 0.2 K/mcL   D Dimer, Quantitative   Result Value Ref Range    D Dimer, Quantitative 0.44 <0.57 mg/L (FEU)   TROPONIN I, HIGH SENSITIVITY   Result Value Ref Range    Troponin I, High Sensitivity 7 <52 ng/L       EKG Results     EKG Interpretation  Rate: 56 BPM  TX: 136 ms  QRS:  82 ms  QTc: 442 ms  Rhythm: Sinus bradycardia. No significant ST elevations or ST depressions.     EKG interpreted by ED physician   Repeat EKG my interpretation is sinus bradycardia rate of 48, pr intervals 142 millisecond, QRS 80 millisecond, QTC is 422 milliseconds.  There is no significant ST elevation or depression    Radiology Results     Imaging Results              XR CHEST PA OR AP 1 VIEW (Final result)  Result time 01/10/24 16:38:16      Final result                   Impression:    IMPRESSION:  No acute  cardiopulmonary process.        Electronically Signed by: Eric Shah MD  Signed on: 1/10/2024 4:38 PM  Created on Workstation ID: [id number]  Signed on Workstation ID: [id number]               Narrative:    EXAM:  XR CHEST AP OR PA    CLINICAL HISTORY:  cp      TECHNIQUE:  XR CHEST AP OR PA.    COMPARISON: July 12, 2023.      FINDINGS: The heart is normal. The lungs are clear. The mediastinal  structures are not prominent. There is no effusion or pneumothorax  identified.    Generator device overlies right mid chest. Tongue base lead and chest wall  leads are noted. Osseous structures and soft tissues are normal.                                      ED Medication Orders (From admission, onward)      None            ED Course as of 01/10/24 1917   Wed Tyler 10, 2024   1643 Discussed results with the patient.  [RD]      ED Course User Index  [RD] Lizzy Hernandez       Medical Decision Making  Based on Physical Exam and History, Pt has a normal troponin, repeat troponin normal.  EKG shows no significant ST changes on 2 EKGs.  Patient had chest x-ray independently visualized images and I interpret no obvious pulmonary edema pneumothorax.  D-dimer is normal.   white count normal.  Patient is stable right-sided chest discomfort 2 normal troponins will discharge home follow-up with Cardiology and return for worse  Clinical Impression right-sided chest pain      Clinical Impression     ED Diagnosis   1. Chest pain, unspecified type  SERVICE TO CARDIOLOGY          Disposition        Discharge 1/10/2024  6:21 PM  Cisco M Ayesha discharge to home/self care.        This chart was documented by Lizzy Hernandez, acting as a scribe for Bhavik Chanel DO. 1/10/2024, 4:11 PM.      The documentation recorded by the scribe accurately and completely reflects the service(s) I personally performed and the decisions made by me.        Bhavik Chanel DO  01/10/24 1917

## 2024-01-12 ENCOUNTER — OFFICE VISIT (OUTPATIENT)
Dept: FAMILY MEDICINE CLINIC | Facility: CLINIC | Age: 74
End: 2024-01-12
Payer: COMMERCIAL

## 2024-01-12 VITALS
HEART RATE: 64 BPM | BODY MASS INDEX: 29.52 KG/M2 | SYSTOLIC BLOOD PRESSURE: 132 MMHG | DIASTOLIC BLOOD PRESSURE: 90 MMHG | OXYGEN SATURATION: 98 % | WEIGHT: 250 LBS | HEIGHT: 77 IN

## 2024-01-12 DIAGNOSIS — J01.91 ACUTE RECURRENT SINUSITIS, UNSPECIFIED LOCATION: Primary | ICD-10-CM

## 2024-01-12 DIAGNOSIS — H66.002 NON-RECURRENT ACUTE SUPPURATIVE OTITIS MEDIA OF LEFT EAR WITHOUT SPONTANEOUS RUPTURE OF TYMPANIC MEMBRANE: ICD-10-CM

## 2024-01-12 DIAGNOSIS — N18.31 STAGE 3A CHRONIC KIDNEY DISEASE (HCC): ICD-10-CM

## 2024-01-12 DIAGNOSIS — K63.5 POLYP OF COLON, UNSPECIFIED PART OF COLON, UNSPECIFIED TYPE: ICD-10-CM

## 2024-01-12 DIAGNOSIS — I48.0 PAROXYSMAL ATRIAL FIBRILLATION (HCC): ICD-10-CM

## 2024-01-12 DIAGNOSIS — J01.91 ACUTE RECURRENT SINUSITIS, UNSPECIFIED LOCATION: ICD-10-CM

## 2024-01-12 DIAGNOSIS — I10 ESSENTIAL HYPERTENSION: ICD-10-CM

## 2024-01-12 DIAGNOSIS — I71.21 ANEURYSM OF ASCENDING AORTA WITHOUT RUPTURE (HCC): ICD-10-CM

## 2024-01-12 DIAGNOSIS — I48.91 NEW ONSET ATRIAL FIBRILLATION (HCC): ICD-10-CM

## 2024-01-12 PROBLEM — Z00.00 HEALTH CARE MAINTENANCE: Status: RESOLVED | Noted: 2018-10-05 | Resolved: 2024-01-12

## 2024-01-12 PROCEDURE — 99213 OFFICE O/P EST LOW 20 MIN: CPT | Performed by: FAMILY MEDICINE

## 2024-01-12 RX ORDER — AMOXICILLIN AND CLAVULANATE POTASSIUM 875; 125 MG/1; MG/1
1 TABLET, FILM COATED ORAL EVERY 12 HOURS SCHEDULED
Qty: 20 TABLET | Refills: 0 | Status: SHIPPED | OUTPATIENT
Start: 2024-01-12 | End: 2024-01-22

## 2024-01-12 RX ORDER — IRBESARTAN 150 MG/1
150 TABLET ORAL
Qty: 90 TABLET | Refills: 1 | Status: SHIPPED | OUTPATIENT
Start: 2024-01-12

## 2024-01-12 RX ORDER — AMOXICILLIN AND CLAVULANATE POTASSIUM 875; 125 MG/1; MG/1
1 TABLET, FILM COATED ORAL EVERY 12 HOURS SCHEDULED
Qty: 20 TABLET | Refills: 0 | Status: SHIPPED | OUTPATIENT
Start: 2024-01-12 | End: 2024-01-12 | Stop reason: SDUPTHER

## 2024-01-12 NOTE — ASSESSMENT & PLAN NOTE
Lab Results   Component Value Date    EGFR 30 12/05/2023    EGFR 47 05/24/2023    EGFR 51 11/10/2022    CREATININE 2.07 (H) 12/05/2023    CREATININE 1.46 (H) 05/24/2023    CREATININE 1.37 (H) 11/10/2022   Going for  lab  Tuesday

## 2024-01-12 NOTE — PROGRESS NOTES
Name: Kamaljit Bhatia      : 1950      MRN: [id number]  Encounter Provider: Munira Smalls MD  Encounter Date: 2024   Encounter department: [facility]IMARY [facility]    Assessment & Plan     1. Acute recurrent sinusitis, unspecified location  -     amoxicillin-clavulanate (AUGMENTIN) 875-125 mg per tablet; Take 1 tablet by mouth every 12 (twelve) hours for 10 days    2. Non-recurrent acute suppurative otitis media of left ear without spontaneous rupture of tympanic membrane  -     amoxicillin-clavulanate (AUGMENTIN) 875-125 mg per tablet; Take 1 tablet by mouth every 12 (twelve) hours for 10 days    3. Aneurysm of ascending aorta without rupture (HCC)    4. Paroxysmal atrial fibrillation (HCC)  Assessment & Plan:  Sees  cards,on Xarelto      5. Stage 3a chronic kidney disease (HCC)  Assessment & Plan:  Lab Results   Component Value Date    EGFR 30 2023    EGFR 47 2023    EGFR 51 11/10/2022    CREATININE 2.07 (H) 2023    CREATININE 1.46 (H) 2023    CREATININE 1.37 (H) 11/10/2022   Going for  lab  Tuesday      6. Essential hypertension  -     irbesartan (AVAPRO) 150 mg tablet; Take 1 tablet (150 mg total) by mouth daily at bedtime    7. New onset atrial fibrillation (HCC)  -     rivaroxaban (Xarelto) 20 mg tablet; Take 1 tablet (20 mg total) by mouth daily with dinner    8. Polyp of colon, unspecified part of colon, unspecified type  -     Ambulatory Referral to Gastroenterology; Future           Subjective      Patient presents with:  Cold Like Symptoms: Cold sx started about 1.5 weeks ago, runny nose, cough.  Patient was down visiting his daughter in [place] and started with symptoms there is some illness going through there he brought home and then his wife and son also got sick.  Patient is bringing up grayish-yellow mucus with the cough and some discolored mucus out of his nose. no fever, no ear  pain or  sore throat. Did  cvoid  test when 1st got  sick and that  "was  okay         Review of Systems   Constitutional:  Negative for chills, fatigue and fever.   HENT:  Positive for postnasal drip, rhinorrhea, sinus pressure and sinus pain. Negative for ear pain and sore throat.    Respiratory:  Positive for cough.    Gastrointestinal:  Negative for nausea and vomiting.   Musculoskeletal:  Positive for arthralgias. Negative for myalgias.        Hip pain   Neurological:  Negative for dizziness, light-headedness and headaches.   Psychiatric/Behavioral:  Positive for sleep disturbance.         Due to cough       Current Outpatient Medications on File Prior to Visit   Medication Sig    atorvastatin (LIPITOR) 40 mg tablet TAKE 1 TABLET DAILY WITH   DINNER    Cholecalciferol (Vitamin D) 50 MCG (2000 UT) tablet Take 2,000 Units by mouth daily    [DISCONTINUED] irbesartan (AVAPRO) 150 mg tablet Take 1 tablet (150 mg total) by mouth daily at bedtime    [DISCONTINUED] rivaroxaban (Xarelto) 20 mg tablet Take 1 tablet (20 mg total) by mouth daily with dinner       Objective     /90 (BP Location: Left arm, Patient Position: Sitting, Cuff Size: Large)   Pulse 64   Ht 6' 5\" (1.956 m)   Wt 113 kg (250 lb)   SpO2 98%   BMI 29.65 kg/m²     Physical Exam  Vitals reviewed.   Constitutional:       Appearance: Normal appearance. He is obese.   HENT:      Right Ear: There is impacted cerumen.      Left Ear: A middle ear effusion is present. Tympanic membrane is erythematous.      Nose: Congestion present. No rhinorrhea.      Right Sinus: No maxillary sinus tenderness or frontal sinus tenderness.      Left Sinus: Maxillary sinus tenderness and frontal sinus tenderness present.      Mouth/Throat:      Pharynx: No oropharyngeal exudate or posterior oropharyngeal erythema.   Cardiovascular:      Rate and Rhythm: Normal rate. Rhythm irregular.      Pulses: Normal pulses.      Heart sounds: Normal heart sounds.   Pulmonary:      Effort: Pulmonary effort is normal.      Breath sounds: Normal breath " sounds.   Lymphadenopathy:      Cervical: No cervical adenopathy.   Neurological:      Mental Status: He is alert.       Munira Smalls MD

## 2024-01-18 ENCOUNTER — APPOINTMENT (OUTPATIENT)
Dept: LAB | Facility: CLINIC | Age: 74
End: 2024-01-18
Payer: COMMERCIAL

## 2024-01-18 ENCOUNTER — TELEPHONE (OUTPATIENT)
Dept: FAMILY MEDICINE CLINIC | Facility: CLINIC | Age: 74
End: 2024-01-18

## 2024-01-18 DIAGNOSIS — N18.31 STAGE 3A CHRONIC KIDNEY DISEASE (HCC): ICD-10-CM

## 2024-01-18 LAB
ANION GAP SERPL CALCULATED.3IONS-SCNC: 8 MMOL/L
BUN SERPL-MCNC: 29 MG/DL (ref 5–25)
CALCIUM SERPL-MCNC: 10.1 MG/DL (ref 8.4–10.2)
CHLORIDE SERPL-SCNC: 105 MMOL/L (ref 96–108)
CO2 SERPL-SCNC: 30 MMOL/L (ref 21–32)
CREAT SERPL-MCNC: 1.45 MG/DL (ref 0.6–1.3)
GFR SERPL CREATININE-BSD FRML MDRD: 47 ML/MIN/1.73SQ M
GLUCOSE P FAST SERPL-MCNC: 89 MG/DL (ref 65–99)
POTASSIUM SERPL-SCNC: 4.5 MMOL/L (ref 3.5–5.3)
SODIUM SERPL-SCNC: 143 MMOL/L (ref 135–147)

## 2024-01-18 PROCEDURE — 80048 BASIC METABOLIC PNL TOTAL CA: CPT

## 2024-01-18 PROCEDURE — 36415 COLL VENOUS BLD VENIPUNCTURE: CPT

## 2024-01-18 NOTE — TELEPHONE ENCOUNTER
Patient walked into our office today in regards of needing refills for [facility] - [address]. Phone number for pharmacy is [phone], As patient previous mail order pharmacy had moved and would like the following medication to be sent, Irbesartan (Avapro) 150 MG tablet, Rivaroxaban (Xarelto) 20 and Amoxicillin- Clavulanate (Augmentin) and to be sent that address as soon as possible, as currently patient  has only one of his Blood pressure medication for Irbesartan 150 MG. Thank you.     If his irbesartan (Avapro) 150 Mg may as  well may be sent into [facility] if needed if prescription is unable to be refilled right away. Patient would like a courtesy refill if allowed of at least a week worth of pills.

## 2024-03-12 PROCEDURE — 88305 TISSUE EXAM BY PATHOLOGIST: CPT | Performed by: STUDENT IN AN ORGANIZED HEALTH CARE EDUCATION/TRAINING PROGRAM

## 2024-03-13 ENCOUNTER — LAB REQUISITION (OUTPATIENT)
Dept: LAB | Facility: HOSPITAL | Age: 74
End: 2024-03-13
Payer: COMMERCIAL

## 2024-03-13 DIAGNOSIS — Z86.010 PERSONAL HISTORY OF COLONIC POLYPS: ICD-10-CM

## 2024-03-13 DIAGNOSIS — Z86.011 PERSONAL HISTORY OF BENIGN NEOPLASM OF THE BRAIN: ICD-10-CM

## 2024-03-15 PROCEDURE — 88305 TISSUE EXAM BY PATHOLOGIST: CPT | Performed by: STUDENT IN AN ORGANIZED HEALTH CARE EDUCATION/TRAINING PROGRAM

## 2024-06-09 DIAGNOSIS — E78.2 MIXED HYPERLIPIDEMIA: ICD-10-CM

## 2024-06-09 RX ORDER — ATORVASTATIN CALCIUM 40 MG/1
TABLET, FILM COATED ORAL
Qty: 90 TABLET | Refills: 1 | Status: SHIPPED | OUTPATIENT
Start: 2024-06-09

## 2024-06-12 ENCOUNTER — APPOINTMENT (OUTPATIENT)
Dept: LAB | Facility: CLINIC | Age: 74
End: 2024-06-12
Payer: COMMERCIAL

## 2024-06-12 DIAGNOSIS — I71.21 ANEURYSM OF ASCENDING AORTA WITHOUT RUPTURE (HCC): ICD-10-CM

## 2024-06-12 DIAGNOSIS — I48.0 PAROXYSMAL ATRIAL FIBRILLATION (HCC): ICD-10-CM

## 2024-06-12 DIAGNOSIS — R73.9 HYPERGLYCEMIA: ICD-10-CM

## 2024-06-12 DIAGNOSIS — E55.9 VITAMIN D DEFICIENCY: ICD-10-CM

## 2024-06-12 DIAGNOSIS — E03.9 ACQUIRED HYPOTHYROIDISM: ICD-10-CM

## 2024-06-12 DIAGNOSIS — E78.2 MIXED HYPERLIPIDEMIA: ICD-10-CM

## 2024-06-12 DIAGNOSIS — I10 ESSENTIAL HYPERTENSION: ICD-10-CM

## 2024-06-12 DIAGNOSIS — N40.1 BENIGN PROSTATIC HYPERPLASIA WITH NOCTURIA: ICD-10-CM

## 2024-06-12 DIAGNOSIS — Z00.00 HEALTH CARE MAINTENANCE: ICD-10-CM

## 2024-06-12 DIAGNOSIS — N18.31 STAGE 3A CHRONIC KIDNEY DISEASE (HCC): ICD-10-CM

## 2024-06-12 DIAGNOSIS — I71.40 ABDOMINAL AORTIC ANEURYSM (AAA) WITHOUT RUPTURE, UNSPECIFIED PART (HCC): ICD-10-CM

## 2024-06-12 DIAGNOSIS — R35.1 BENIGN PROSTATIC HYPERPLASIA WITH NOCTURIA: ICD-10-CM

## 2024-06-12 DIAGNOSIS — Z85.528 PERSONAL HISTORY OF KIDNEY CANCER: ICD-10-CM

## 2024-06-12 DIAGNOSIS — Z79.01 ANTICOAGULATED BY ANTICOAGULATION TREATMENT: ICD-10-CM

## 2024-06-12 DIAGNOSIS — K63.5 POLYP OF COLON, UNSPECIFIED PART OF COLON, UNSPECIFIED TYPE: ICD-10-CM

## 2024-06-12 LAB
25(OH)D3 SERPL-MCNC: 42.3 NG/ML (ref 30–100)
ALBUMIN SERPL BCP-MCNC: 4.3 G/DL (ref 3.5–5)
ALP SERPL-CCNC: 71 U/L (ref 34–104)
ALT SERPL W P-5'-P-CCNC: 22 U/L (ref 7–52)
ANION GAP SERPL CALCULATED.3IONS-SCNC: 9 MMOL/L (ref 4–13)
AST SERPL W P-5'-P-CCNC: 19 U/L (ref 13–39)
BILIRUB SERPL-MCNC: 1.1 MG/DL (ref 0.2–1)
BILIRUB UR QL STRIP: NEGATIVE
BUN SERPL-MCNC: 26 MG/DL (ref 5–25)
CALCIUM SERPL-MCNC: 9.5 MG/DL (ref 8.4–10.2)
CHLORIDE SERPL-SCNC: 105 MMOL/L (ref 96–108)
CHOLEST SERPL-MCNC: 111 MG/DL
CLARITY UR: CLEAR
CO2 SERPL-SCNC: 28 MMOL/L (ref 21–32)
COLOR UR: NORMAL
CREAT SERPL-MCNC: 1.29 MG/DL (ref 0.6–1.3)
ERYTHROCYTE [DISTWIDTH] IN BLOOD BY AUTOMATED COUNT: 11.9 % (ref 11.6–15.1)
EST. AVERAGE GLUCOSE BLD GHB EST-MCNC: 123 MG/DL
GFR SERPL CREATININE-BSD FRML MDRD: 54 ML/MIN/1.73SQ M
GLUCOSE P FAST SERPL-MCNC: 110 MG/DL (ref 65–99)
GLUCOSE UR STRIP-MCNC: NEGATIVE MG/DL
HBA1C MFR BLD: 5.9 %
HCT VFR BLD AUTO: 41.6 % (ref 36.5–49.3)
HDLC SERPL-MCNC: 40 MG/DL
HGB BLD-MCNC: 13.8 G/DL (ref 12–17)
HGB UR QL STRIP.AUTO: NEGATIVE
KETONES UR STRIP-MCNC: NEGATIVE MG/DL
LDLC SERPL CALC-MCNC: 50 MG/DL (ref 0–100)
LEUKOCYTE ESTERASE UR QL STRIP: NEGATIVE
MCH RBC QN AUTO: 30.6 PG (ref 26.8–34.3)
MCHC RBC AUTO-ENTMCNC: 33.2 G/DL (ref 31.4–37.4)
MCV RBC AUTO: 92 FL (ref 82–98)
NITRITE UR QL STRIP: NEGATIVE
PH UR STRIP.AUTO: 6.5 [PH]
PLATELET # BLD AUTO: 301 THOUSANDS/UL (ref 149–390)
PMV BLD AUTO: 9.7 FL (ref 8.9–12.7)
POTASSIUM SERPL-SCNC: 4.2 MMOL/L (ref 3.5–5.3)
PROT SERPL-MCNC: 6.4 G/DL (ref 6.4–8.4)
PROT UR STRIP-MCNC: NEGATIVE MG/DL
PSA SERPL-MCNC: 0.3 NG/ML (ref 0–4)
RBC # BLD AUTO: 4.51 MILLION/UL (ref 3.88–5.62)
SODIUM SERPL-SCNC: 142 MMOL/L (ref 135–147)
SP GR UR STRIP.AUTO: 1.01 (ref 1–1.03)
TRIGL SERPL-MCNC: 106 MG/DL
TSH SERPL DL<=0.05 MIU/L-ACNC: 4.26 UIU/ML (ref 0.45–4.5)
UROBILINOGEN UR STRIP-ACNC: <2 MG/DL
WBC # BLD AUTO: 8.43 THOUSAND/UL (ref 4.31–10.16)

## 2024-06-12 PROCEDURE — 80061 LIPID PANEL: CPT

## 2024-06-12 PROCEDURE — 80053 COMPREHEN METABOLIC PANEL: CPT

## 2024-06-12 PROCEDURE — 83036 HEMOGLOBIN GLYCOSYLATED A1C: CPT

## 2024-06-12 PROCEDURE — 81003 URINALYSIS AUTO W/O SCOPE: CPT

## 2024-06-12 PROCEDURE — 85027 COMPLETE CBC AUTOMATED: CPT

## 2024-06-12 PROCEDURE — 36415 COLL VENOUS BLD VENIPUNCTURE: CPT

## 2024-06-12 PROCEDURE — 84443 ASSAY THYROID STIM HORMONE: CPT

## 2024-06-12 PROCEDURE — 82306 VITAMIN D 25 HYDROXY: CPT

## 2024-06-12 PROCEDURE — 84153 ASSAY OF PSA TOTAL: CPT

## 2024-06-18 ENCOUNTER — OFFICE VISIT (OUTPATIENT)
Dept: FAMILY MEDICINE CLINIC | Facility: CLINIC | Age: 74
End: 2024-06-18
Payer: COMMERCIAL

## 2024-06-18 VITALS
DIASTOLIC BLOOD PRESSURE: 70 MMHG | RESPIRATION RATE: 20 BRPM | SYSTOLIC BLOOD PRESSURE: 124 MMHG | OXYGEN SATURATION: 97 % | TEMPERATURE: 97.1 F | WEIGHT: 241 LBS | HEART RATE: 66 BPM | HEIGHT: 77 IN | BODY MASS INDEX: 28.46 KG/M2

## 2024-06-18 DIAGNOSIS — Z79.01 ANTICOAGULATED BY ANTICOAGULATION TREATMENT: ICD-10-CM

## 2024-06-18 DIAGNOSIS — R35.1 BENIGN PROSTATIC HYPERPLASIA WITH NOCTURIA: ICD-10-CM

## 2024-06-18 DIAGNOSIS — K63.5 POLYP OF COLON, UNSPECIFIED PART OF COLON, UNSPECIFIED TYPE: ICD-10-CM

## 2024-06-18 DIAGNOSIS — I48.0 PAROXYSMAL ATRIAL FIBRILLATION (HCC): ICD-10-CM

## 2024-06-18 DIAGNOSIS — E80.4 GILBERT'S SYNDROME: ICD-10-CM

## 2024-06-18 DIAGNOSIS — E55.9 VITAMIN D DEFICIENCY: ICD-10-CM

## 2024-06-18 DIAGNOSIS — E78.2 MIXED HYPERLIPIDEMIA: ICD-10-CM

## 2024-06-18 DIAGNOSIS — I71.40 ABDOMINAL AORTIC ANEURYSM (AAA) WITHOUT RUPTURE, UNSPECIFIED PART (HCC): Primary | ICD-10-CM

## 2024-06-18 DIAGNOSIS — E03.9 ACQUIRED HYPOTHYROIDISM: ICD-10-CM

## 2024-06-18 DIAGNOSIS — N40.1 BENIGN PROSTATIC HYPERPLASIA WITH NOCTURIA: ICD-10-CM

## 2024-06-18 DIAGNOSIS — L30.9 DERMATITIS: ICD-10-CM

## 2024-06-18 DIAGNOSIS — N18.31 STAGE 3A CHRONIC KIDNEY DISEASE (HCC): ICD-10-CM

## 2024-06-18 DIAGNOSIS — R73.9 HYPERGLYCEMIA: ICD-10-CM

## 2024-06-18 DIAGNOSIS — I71.21 ANEURYSM OF ASCENDING AORTA WITHOUT RUPTURE (HCC): ICD-10-CM

## 2024-06-18 DIAGNOSIS — I10 ESSENTIAL HYPERTENSION: ICD-10-CM

## 2024-06-18 PROCEDURE — G2211 COMPLEX E/M VISIT ADD ON: HCPCS | Performed by: FAMILY MEDICINE

## 2024-06-18 PROCEDURE — 99214 OFFICE O/P EST MOD 30 MIN: CPT | Performed by: FAMILY MEDICINE

## 2024-06-18 RX ORDER — CLOTRIMAZOLE AND BETAMETHASONE DIPROPIONATE 10; .64 MG/G; MG/G
CREAM TOPICAL 2 TIMES DAILY
Qty: 45 G | Refills: 1 | Status: SHIPPED | OUTPATIENT
Start: 2024-06-18

## 2024-06-18 NOTE — PATIENT INSTRUCTIONS
Continue present therapy  Follow with all specialist per their instructions  Use Lotrisone cream twice daily to rash on legs if not improving in 1 week and not resolved in 2 weeks return to office  Return in 6 months for office visit, blood work, and AWV

## 2024-06-18 NOTE — ASSESSMENT & PLAN NOTE
Lab Results   Component Value Date    EGFR 54 06/12/2024    EGFR 47 01/18/2024    EGFR 30 12/05/2023    CREATININE 1.29 06/12/2024    CREATININE 1.45 (H) 01/18/2024    CREATININE 2.07 (H) 12/05/2023   GFR has improved to 54 continue ARB and follow-up with nephrology

## 2024-06-18 NOTE — PROGRESS NOTES
Ambulatory Visit  Name: Kamaljit Lombardonury      : 1950      MRN: [id number]  Encounter Provider: Alex Maradiaga DO  Encounter Date: 2024   [facility]    1.  Per abdominal aortic aneurysm, stable follows with vascular surgery #2 prehypertension, stable continue present therapy  3.  Ascending aortic aneurysm, stable follows with CT surgery  4.  Paroxysmal A-fib/anticoagulated, stable follows with cardiology  5.  CKD-3, stable on ARB follows with nephrology  6.  BPH, stable PSA normal follows with urology  7.  Gilbert's syndrome monitor bilirubin #8.  Hyperglycemia diet controlled  9.  Hyperlipidemia stable on atorvastatin  10.  Vitamin D deficiency, stable continue supplementation  11.  Colon polyp status post colonoscopy 3/21/2024  12.  Dermatitis question fungal versus allergic will use Lotrisone call if not improved in 1 week and not resolved in 2 weeks  13.  Hypothyroidism, stable continue present therapy #14.  Return in 6 months for office visit, blood work, and AWV        Assessment & Plan   1. Abdominal aortic aneurysm (AAA) without rupture, unspecified part (HCC)  Assessment & Plan:  Stable to follow with vascular surgery  Orders:  -     CBC; Future; Expected date: 2024  -     Comprehensive metabolic panel; Future; Expected date: 2024  -     Hemoglobin A1C; Future; Expected date: 2024  -     Lipid Panel with Direct LDL reflex; Future; Expected date: 2024  -     TSH, 3rd generation with Free T4 reflex; Future; Expected date: 2024  -     UA (URINE) with reflex to Scope; Future; Expected date: 2024  -     Vitamin D 25 hydroxy; Future; Expected date: 2024  2. Essential hypertension  Assessment & Plan:  Stable continue present therapy  Orders:  -     CBC; Future; Expected date: 2024  -     Comprehensive metabolic panel; Future; Expected date: 2024  -     Hemoglobin A1C; Future; Expected date:  12/18/2024  -     Lipid Panel with Direct LDL reflex; Future; Expected date: 12/18/2024  -     TSH, 3rd generation with Free T4 reflex; Future; Expected date: 12/18/2024  -     UA (URINE) with reflex to Scope; Future; Expected date: 12/18/2024  -     Vitamin D 25 hydroxy; Future; Expected date: 12/18/2024  3. Aneurysm of ascending aorta without rupture (HCC)  Assessment & Plan:  Stable follows with thoracic surgery    Orders:  -     CBC; Future; Expected date: 12/18/2024  -     Comprehensive metabolic panel; Future; Expected date: 12/18/2024  -     Hemoglobin A1C; Future; Expected date: 12/18/2024  -     Lipid Panel with Direct LDL reflex; Future; Expected date: 12/18/2024  -     TSH, 3rd generation with Free T4 reflex; Future; Expected date: 12/18/2024  -     UA (URINE) with reflex to Scope; Future; Expected date: 12/18/2024  -     Vitamin D 25 hydroxy; Future; Expected date: 12/18/2024  4. Paroxysmal atrial fibrillation (HCC)  Assessment & Plan:  Stable follows with cardiology  Orders:  -     CBC; Future; Expected date: 12/18/2024  -     Comprehensive metabolic panel; Future; Expected date: 12/18/2024  -     Hemoglobin A1C; Future; Expected date: 12/18/2024  -     Lipid Panel with Direct LDL reflex; Future; Expected date: 12/18/2024  -     TSH, 3rd generation with Free T4 reflex; Future; Expected date: 12/18/2024  -     UA (URINE) with reflex to Scope; Future; Expected date: 12/18/2024  -     Vitamin D 25 hydroxy; Future; Expected date: 12/18/2024  5. Stage 3a chronic kidney disease (HCC)  Assessment & Plan:  Lab Results   Component Value Date    EGFR 54 06/12/2024    EGFR 47 01/18/2024    EGFR 30 12/05/2023    CREATININE 1.29 06/12/2024    CREATININE 1.45 (H) 01/18/2024    CREATININE 2.07 (H) 12/05/2023   GFR has improved to 54 continue ARB and follow-up with nephrology  Orders:  -     CBC; Future; Expected date: 12/18/2024  -     Comprehensive metabolic panel; Future; Expected date: 12/18/2024  -     Hemoglobin  A1C; Future; Expected date: 12/18/2024  -     Lipid Panel with Direct LDL reflex; Future; Expected date: 12/18/2024  -     TSH, 3rd generation with Free T4 reflex; Future; Expected date: 12/18/2024  -     UA (URINE) with reflex to Scope; Future; Expected date: 12/18/2024  -     Vitamin D 25 hydroxy; Future; Expected date: 12/18/2024  6. Acquired hypothyroidism  Assessment & Plan:  Stable continue present therapy  Orders:  -     CBC; Future; Expected date: 12/18/2024  -     Comprehensive metabolic panel; Future; Expected date: 12/18/2024  -     Hemoglobin A1C; Future; Expected date: 12/18/2024  -     Lipid Panel with Direct LDL reflex; Future; Expected date: 12/18/2024  -     TSH, 3rd generation with Free T4 reflex; Future; Expected date: 12/18/2024  -     UA (URINE) with reflex to Scope; Future; Expected date: 12/18/2024  -     Vitamin D 25 hydroxy; Future; Expected date: 12/18/2024  7. Anticoagulated by anticoagulation treatment  Assessment & Plan:  Continue Xarelto  Orders:  -     CBC; Future; Expected date: 12/18/2024  -     Comprehensive metabolic panel; Future; Expected date: 12/18/2024  -     Hemoglobin A1C; Future; Expected date: 12/18/2024  -     Lipid Panel with Direct LDL reflex; Future; Expected date: 12/18/2024  -     TSH, 3rd generation with Free T4 reflex; Future; Expected date: 12/18/2024  -     UA (URINE) with reflex to Scope; Future; Expected date: 12/18/2024  -     Vitamin D 25 hydroxy; Future; Expected date: 12/18/2024  8. Benign prostatic hyperplasia with nocturia  Assessment & Plan:  Follows with urology PSA is normal    Orders:  -     CBC; Future; Expected date: 12/18/2024  -     Comprehensive metabolic panel; Future; Expected date: 12/18/2024  -     Hemoglobin A1C; Future; Expected date: 12/18/2024  -     Lipid Panel with Direct LDL reflex; Future; Expected date: 12/18/2024  -     TSH, 3rd generation with Free T4 reflex; Future; Expected date: 12/18/2024  -     UA (URINE) with reflex to Scope;  Future; Expected date: 12/18/2024  -     Vitamin D 25 hydroxy; Future; Expected date: 12/18/2024  9. Gilbert's syndrome  Assessment & Plan:  Continue to monitor bilirubin  Orders:  -     CBC; Future; Expected date: 12/18/2024  -     Comprehensive metabolic panel; Future; Expected date: 12/18/2024  -     Hemoglobin A1C; Future; Expected date: 12/18/2024  -     Lipid Panel with Direct LDL reflex; Future; Expected date: 12/18/2024  -     TSH, 3rd generation with Free T4 reflex; Future; Expected date: 12/18/2024  -     UA (URINE) with reflex to Scope; Future; Expected date: 12/18/2024  -     Vitamin D 25 hydroxy; Future; Expected date: 12/18/2024  10. Hyperglycemia  Assessment & Plan:  Low sugar low carbohydrate diet recommended  Orders:  -     CBC; Future; Expected date: 12/18/2024  -     Comprehensive metabolic panel; Future; Expected date: 12/18/2024  -     Hemoglobin A1C; Future; Expected date: 12/18/2024  -     Lipid Panel with Direct LDL reflex; Future; Expected date: 12/18/2024  -     TSH, 3rd generation with Free T4 reflex; Future; Expected date: 12/18/2024  -     UA (URINE) with reflex to Scope; Future; Expected date: 12/18/2024  -     Vitamin D 25 hydroxy; Future; Expected date: 12/18/2024  11. Mixed hyperlipidemia  Assessment & Plan:  Stable continue atorvastatin 40 mg daily  Orders:  -     CBC; Future; Expected date: 12/18/2024  -     Comprehensive metabolic panel; Future; Expected date: 12/18/2024  -     Hemoglobin A1C; Future; Expected date: 12/18/2024  -     Lipid Panel with Direct LDL reflex; Future; Expected date: 12/18/2024  -     TSH, 3rd generation with Free T4 reflex; Future; Expected date: 12/18/2024  -     UA (URINE) with reflex to Scope; Future; Expected date: 12/18/2024  -     Vitamin D 25 hydroxy; Future; Expected date: 12/18/2024  12. Vitamin D deficiency  Assessment & Plan:  Stable continue present therapy  Orders:  -     CBC; Future; Expected date: 12/18/2024  -     Comprehensive metabolic  panel; Future; Expected date: 12/18/2024  -     Hemoglobin A1C; Future; Expected date: 12/18/2024  -     Lipid Panel with Direct LDL reflex; Future; Expected date: 12/18/2024  -     TSH, 3rd generation with Free T4 reflex; Future; Expected date: 12/18/2024  -     UA (URINE) with reflex to Scope; Future; Expected date: 12/18/2024  -     Vitamin D 25 hydroxy; Future; Expected date: 12/18/2024  13. Polyp of colon, unspecified part of colon, unspecified type  Assessment & Plan:  Colonoscopy 3/4/2020 4 repeat 2 years  Orders:  -     CBC; Future; Expected date: 12/18/2024  -     Comprehensive metabolic panel; Future; Expected date: 12/18/2024  -     Hemoglobin A1C; Future; Expected date: 12/18/2024  -     Lipid Panel with Direct LDL reflex; Future; Expected date: 12/18/2024  -     TSH, 3rd generation with Free T4 reflex; Future; Expected date: 12/18/2024  -     UA (URINE) with reflex to Scope; Future; Expected date: 12/18/2024  -     Vitamin D 25 hydroxy; Future; Expected date: 12/18/2024  14. Dermatitis  -     clotrimazole-betamethasone (LOTRISONE) 1-0.05 % cream; Apply topically 2 (two) times a day  -     CBC; Future; Expected date: 12/18/2024  -     Comprehensive metabolic panel; Future; Expected date: 12/18/2024  -     Hemoglobin A1C; Future; Expected date: 12/18/2024  -     Lipid Panel with Direct LDL reflex; Future; Expected date: 12/18/2024  -     TSH, 3rd generation with Free T4 reflex; Future; Expected date: 12/18/2024  -     UA (URINE) with reflex to Scope; Future; Expected date: 12/18/2024  -     Vitamin D 25 hydroxy; Future; Expected date: 12/18/2024      Depression Screening and Follow-up Plan: Patient was screened for depression during today's encounter. They screened negative with a PHQ-2 score of 0.      History of Present Illness     Patient is doing well except he does have a mildly pruritic rash bilateral ankle areas for the past few weeks.  No treatment was used.  Patient lose 9 pounds since last  "office visit.  Patient had colonoscopy back in March.  Blood work was discussed        Review of Systems   Constitutional: Negative.    HENT: Negative.     Eyes: Negative.    Respiratory: Negative.     Cardiovascular: Negative.    Gastrointestinal:         HPI   Endocrine: Negative.    Genitourinary: Negative.    Musculoskeletal: Negative.    Skin:         HPI   Allergic/Immunologic: Negative.    Neurological: Negative.    Hematological: Negative.    Psychiatric/Behavioral: Negative.         Objective     /70   Pulse 66   Temp (!) 97.1 °F (36.2 °C) (Tympanic)   Resp 20   Ht 6' 5\" (1.956 m)   Wt 109 kg (241 lb)   SpO2 97%   BMI 28.58 kg/m²     Physical Exam  Vitals and nursing note reviewed.   Constitutional:       Appearance: Normal appearance.   HENT:      Head: Normocephalic and atraumatic.      Mouth/Throat:      Mouth: Mucous membranes are moist.   Eyes:      General: No scleral icterus.  Neck:      Vascular: No carotid bruit.   Cardiovascular:      Rate and Rhythm: Normal rate and regular rhythm.      Heart sounds: Normal heart sounds.   Pulmonary:      Effort: Pulmonary effort is normal.      Breath sounds: Normal breath sounds.   Abdominal:      General: Bowel sounds are normal.      Palpations: Abdomen is soft.      Tenderness: There is no abdominal tenderness.   Musculoskeletal:      Cervical back: Neck supple.      Right lower leg: No edema.      Left lower leg: No edema.   Skin:     General: Skin is warm and dry.      Findings: Rash present.      Comments: Bilateral ankles with mildly erythematous patch 2 inch x 2 inch area negative central clearing   Neurological:      General: No focal deficit present.      Mental Status: He is alert.   Psychiatric:         Mood and Affect: Mood normal.       Administrative Statements           "

## 2024-07-09 DIAGNOSIS — I10 ESSENTIAL HYPERTENSION: ICD-10-CM

## 2024-07-09 DIAGNOSIS — I48.91 NEW ONSET ATRIAL FIBRILLATION (HCC): ICD-10-CM

## 2024-07-09 RX ORDER — IRBESARTAN 150 MG/1
150 TABLET ORAL
Qty: 90 TABLET | Refills: 1 | Status: SHIPPED | OUTPATIENT
Start: 2024-07-09

## 2024-07-09 RX ORDER — RIVAROXABAN 20 MG/1
20 TABLET, FILM COATED ORAL
Qty: 90 TABLET | Refills: 1 | Status: SHIPPED | OUTPATIENT
Start: 2024-07-09

## 2024-07-30 DIAGNOSIS — I71.21 ANEURYSM OF ASCENDING AORTA WITHOUT RUPTURE (HCC): Primary | ICD-10-CM

## 2024-09-01 ENCOUNTER — HOSPITAL ENCOUNTER (OUTPATIENT)
Dept: CT IMAGING | Facility: HOSPITAL | Age: 74
Discharge: HOME | End: 2024-09-01
Attending: THORACIC SURGERY (CARDIOTHORACIC VASCULAR SURGERY)
Payer: COMMERCIAL

## 2024-09-01 DIAGNOSIS — I71.21 ANEURYSM OF ASCENDING AORTA WITHOUT RUPTURE (HCC): ICD-10-CM

## 2024-09-01 PROCEDURE — 71250 CT THORAX DX C-: CPT

## 2024-09-26 ENCOUNTER — OFFICE VISIT (OUTPATIENT)
Dept: CARDIAC SURGERY | Facility: CLINIC | Age: 74
End: 2024-09-26
Payer: COMMERCIAL

## 2024-09-26 VITALS
DIASTOLIC BLOOD PRESSURE: 77 MMHG | BODY MASS INDEX: 27.75 KG/M2 | HEART RATE: 54 BPM | OXYGEN SATURATION: 100 % | HEIGHT: 77 IN | WEIGHT: 235 LBS | SYSTOLIC BLOOD PRESSURE: 165 MMHG

## 2024-09-26 DIAGNOSIS — I48.0 PAROXYSMAL ATRIAL FIBRILLATION (HCC): ICD-10-CM

## 2024-09-26 DIAGNOSIS — I71.21 ANEURYSM OF ASCENDING AORTA WITHOUT RUPTURE (HCC): Primary | ICD-10-CM

## 2024-09-26 PROCEDURE — 99214 OFFICE O/P EST MOD 30 MIN: CPT | Performed by: THORACIC SURGERY (CARDIOTHORACIC VASCULAR SURGERY)

## 2024-09-26 NOTE — LETTER
September 26, 2024     Alex Maradiaga, DO  [address] [facility]  [address]  [place] [address]    Patient: Kamaljit Bhatia   YOB: 1950   Date of Visit: 9/26/2024       Dear Dr. Maradiaga:    Thank you for referring Kamaljit Bhatia to me for evaluation. Below are my notes for this consultation.    If you have questions, please do not hesitate to call me. I look forward to following your patient along with you.         Sincerely,        Nehemias Nye MD        CC: No Recipients    Karyna Araiza PA-C  9/26/2024 10:48 AM  Attested  Aortic Surveillance - Cardiac Surgery   Kamaljit Bhatia 73 y.o. male MRN: [id number]    History of Present Illness: Kamaljit Bhatia is a 73 y.o. year old male who presents today for ongoing surveillance of previously identified ascending aortic aneurysm.  They were most recently evaluated in our office with CT imaging in September 2023. He has been followed in our office since August 2022 when an aneurysm was noted on a surveillance CT chest for monitoring of a pulmonary nodule. He has a history of PAF (Xarelto), OA, BPH, colitis, Gilbert's Syndrome, ED, LE venous varicosities, renal cell carcinoma s/p right nephrectomy, CKD3. He saw Dr. Butts in November 2023 and was given a 2 week ambulatory monitor, which demonstrated primarily sinus rhythm, occasional short bursts of SVT.     Patient reports that he continues to feel well. He denies chest pain, back pain, LE edema, dizziness, syncope, numbness/tingling/paresthesias. He maintains an active lifestyle, goes to the gym three days a week doing a combination of strength training and uses the stationary bike. He had bilateral cataract extractions in the last year. He is a former smoker, quit in 2006. He denies family history of SCD, aneurysm or dissection.         Past Medical History:  Past Medical History:   Diagnosis Date   • Arthritis    • Cancer (HCC)     right kidney    • Cellulitis of left leg     June 2016   •  Chronic kidney disease    • Exercises 3 to 4 times per week    • Full dentures    • History of colonoscopy    • Hypertension    • Inguinal hernia, right     last assessed: 2016   • Malignant neoplasm of kidney (HCC)     unspecified laterality Managed by Madiha hathaway MD Last assessed: 3/1/2016   • Renal cell carcinoma (HCC)    • Seasonal allergies    • Thrombosis     superficial left leg 2016   • Varicose veins of both lower extremities          Past Surgical History:   Past Surgical History:   Procedure Laterality Date   • APPENDECTOMY     • COLONOSCOPY     • CYSTOSCOPY      Diagnostic   • HERNIA REPAIR Right 2016    Procedure: REPAIR HERNIA INGUINAL, LAPAROSCOPIC;  Surgeon: Puneet Moreno MD;  Location: [facility];  Service:    • HIP ARTHROPLASTY Left     twice  and    • HIP ARTHROPLASTY Right        • LAPAROSCOPIC INGUINAL HERNIA REPAIR  2016    description: laparoscopic right inguinal hernia repair with mesh    • NEPHRECTOMY Right     due to a tumor   • ROTATOR CUFF REPAIR Left    • TOTAL HIP ARTHROPLASTY     • TOTAL HIP ARTHROPLASTY Bilateral          Family History:  Family History   Problem Relation Age of Onset   • Heart attack Mother    • Cancer Father          Social History:    Social History     Substance and Sexual Activity   Alcohol Use Never     Social History     Substance and Sexual Activity   Drug Use No     Social History     Tobacco Use   Smoking Status Former   • Current packs/day: 0.00   • Types: Cigarettes   • Quit date: 2006   • Years since quittin.1   Smokeless Tobacco Never       Home Medications:   Prior to Admission medications    Medication Sig Start Date End Date Taking? Authorizing Provider   atorvastatin (LIPITOR) 40 mg tablet TAKE 1 TABLET DAILY WITH   DINNER 24  Yes Alex Maradiaga DO   irbesartan (AVAPRO) 150 mg tablet TAKE 1 TABLET BY MOUTH DAILY AT BEDTIME 24  Yes Munira Smalls MD   Xarelto 20 MG tablet TAKE ONE TABLET BY  "MOUTH EVERY DAY WITH DINNER 7/9/24  Yes Munira Smalls MD   Cholecalciferol (Vitamin D) 50 MCG (2000 UT) tablet Take 2,000 Units by mouth daily  Patient not taking: Reported on 9/26/2024    Historical Provider, MD birchzole-betamethasone (LOTRISONE) 1-0.05 % cream Apply topically 2 (two) times a day  Patient not taking: Reported on 9/26/2024 6/18/24   Alex Maradiaga DO       Allergies:  Allergies   Allergen Reactions   • Nsaids Other (See Comments)     Instructed to avoid all ibuprofen's/NSAIDS  as pt only has one kidney       Review of Systems:     Review of Systems   Constitutional:  Negative for chills and fever.   HENT:  Negative for ear pain and sore throat.    Eyes:  Negative for pain and visual disturbance.   Respiratory:  Negative for cough and shortness of breath.    Cardiovascular:  Negative for chest pain and palpitations.   Gastrointestinal:  Negative for abdominal pain and vomiting.   Genitourinary:  Negative for dysuria and hematuria.   Musculoskeletal:  Negative for arthralgias and back pain.   Skin:  Negative for color change and rash.   Neurological:  Negative for seizures and syncope.   All other systems reviewed and are negative.      Vital Signs:     Vitals:    09/26/24 0959 09/26/24 1008   BP: 156/80 165/77   BP Location: Left arm Right arm   Patient Position: Sitting Sitting   Cuff Size: Large Large   Pulse: (!) 54    SpO2: 100%    Weight: 107 kg (235 lb)    Height: 6' 5\" (1.956 m)        Physical Exam:     Physical Exam  Vitals reviewed.   Constitutional:       Appearance: Normal appearance.   HENT:      Head: Normocephalic and atraumatic.   Eyes:      Pupils: Pupils are equal, round, and reactive to light.   Neck:      Vascular: No carotid bruit or JVD.   Cardiovascular:      Rate and Rhythm: Normal rate and regular rhythm.      Pulses:           Carotid pulses are 2+ on the right side and 2+ on the left side.       Radial pulses are 2+ on the right side and 2+ on the left side.       " " Dorsalis pedis pulses are 2+ on the right side and 2+ on the left side.      Heart sounds: Normal heart sounds. No murmur heard.     No friction rub. No gallop.   Pulmonary:      Effort: Pulmonary effort is normal.      Breath sounds: Normal breath sounds. No wheezing, rhonchi or rales.   Abdominal:      Palpations: Abdomen is soft.      Tenderness: There is no abdominal tenderness.   Musculoskeletal:      Cervical back: Normal range of motion.   Skin:     General: Skin is warm and dry.      Capillary Refill: Capillary refill takes less than 2 seconds.   Neurological:      General: No focal deficit present.      Mental Status: He is alert.      Sensory: Sensation is intact.   Psychiatric:         Attention and Perception: Attention normal.         Mood and Affect: Mood normal.         Behavior: Behavior normal. Behavior is cooperative.         Lab Results:               Invalid input(s): \"LABGLOM\"      Lab Results   Component Value Date    HGBA1C 5.9 (H) 06/12/2024     No results found for: \"CKTOTAL\", \"CKMB\", \"CKMBINDEX\", \"TROPONINI\"    Imaging Studies:     CT Chest: 9/1/24  IMPRESSION:  Unchanged 45 mm ectasia of the ascending aorta.  Stable lung nodules.      Echocardiogram: 7/19/22  Left Ventricle Left ventricular cavity size is normal. Wall thickness is increased. There is mild to moderate concentric hypertrophy. The left ventricular ejection fraction is 60%. Systolic function is normal. Global longitudinal strain is normal at -19.5%. Wall motion is normal. Diastolic function is normal.   Right Ventricle Right ventricular cavity size is normal. Systolic function is normal. Wall thickness is normal.   Left Atrium The atrium is normal in size.   Right Atrium The atrium is normal in size.   Aortic Valve The aortic valve is trileaflet. The leaflets are mildly thickened. The leaflets are not calcified. The leaflets exhibit normal mobility. There is no evidence of regurgitation. The aortic valve has no significant " stenosis.   Mitral Valve The mitral valve has normal structure and function. There is no evidence of regurgitation. There is no evidence of stenosis.   Tricuspid Valve Tricuspid valve structure is normal. There is no evidence of regurgitation. There is no evidence of stenosis.   Pulmonic Valve Pulmonic valve structure is normal. There is no evidence of regurgitation. There is no evidence of stenosis.   Ascending Aorta The aortic root is normal in size.   IVC/SVC The inferior vena cava is normal in size.   Pericardium There is no pericardial effusion. The pericardium is normal in appearance.       Personally reviewed the following image studies in PACS and associated radiology reports: CT chest. My interpretation of the radiology images/reports is: as above.    Assessment:  Patient Active Problem List    Diagnosis Date Noted   • Vitamin D deficiency 12/12/2023   • Thoracic aortic aneurysm without rupture (HCC) 08/03/2022   • Anticoagulated by anticoagulation treatment 07/20/2022   • Paroxysmal atrial fibrillation (HCC) 07/18/2022   • Abdominal aortic aneurysm (AAA) without rupture (HCC) 03/24/2021   • Acquired hypothyroidism 10/22/2019   • Gilbert's syndrome 04/12/2019   • Personal history of kidney cancer 04/02/2019   • Tinnitus 10/05/2018   • Inguinal hernia unilateral, non-recurrent 07/21/2016   • Colon polyp 04/21/2015   • ED (erectile dysfunction) 04/21/2015   • Benign prostatic hyperplasia with nocturia 09/03/2013   • Gait disturbance 09/03/2013   • Left knee pain 07/03/2013   • Allergic rhinitis 02/13/2013   • Stage 3 chronic kidney disease (HCC) 08/16/2012   • Hyperglycemia 08/16/2012   • Mixed hyperlipidemia 08/16/2012   • Essential hypertension 08/16/2012   • Osteoarthritis 08/16/2012     Ascending aortic aneurysm; Ongoing ascending aortic replacement workup    Plan:     CT chest, without contrast performed prior to the visit today was reviewed.  Radiographic findings of ascending aorta aneurysm without  significant change (45 mm at it's greatest diameter), were confirmed and shared with the patient today.    cts surveillance plan: The aneurysm size remains unchanged, and does not meet surgical indications for intervention. Therefore follow-up monitoring will be the treatment plan.  Arrangements have been made for future surveillance to be completed with CT chest, without contrast in 1 year.    Kamaljit Bhatia was comfortable with our recommendations, and their questions were answered to their satisfaction.  Thank you for allowing us to participate in the care of this patient.     Aortic Aneurysm Instructions were provided to the patient as follows:    1. No heavy sustained lifting which would require excessive straining  2. Maintain a controlled blood pressure with a goal of 120/80.  3. Follow up in Aortic Clinic as recommended with radiology follow up as instructed  4. Report to the ER or call 911 immediately with the following signs / symptoms: sudden onset of back pain, chest pain or shortness of breath.    The patient recently had a screening colonoscopy in March 2024.  Therefore GI referral is not indicated at this time.     SIGNATURE: Karyna Araiza PA-C  DATE: 09/26/24  TIME: 10:11 AM  Attestation signed by Nehemias Nye MD at 9/26/2024 11:02 AM:  Attending Attestation:    I supervised the Advanced Practitioner on 9/26/2024.  I discussed the case with the Advanced Practitioner, reviewed the note and agree.    The patient is a very pleasant 73-year-old man that returns to the aortic clinic for a 1 year follow-up.  He remains asymptomatic.  I have personally reviewed his diagnostic images, CT of the chest on 9/1/2024 shows an ascending aorta with a maximum diameter of 4.5 cm and aortic root of 4.3 cm which is unchanged compared to studies in 2022 in 2023.  There is no need for intervention at this time.  We talked about the importance of maintaining adequate blood pressure control, avoid tobacco products,  avoid sustained straining, he was also instructed to go to emergency department if he were to experience severe chest pain or severe upper back pain.  I would like to see him back in 1 year with a noncontrast CT of the chest.

## 2024-09-26 NOTE — PROGRESS NOTES
Aortic Surveillance - Cardiac Surgery   Kamaljit Bhatia 73 y.o. male MRN: [id number]    History of Present Illness: Kamaljit Bhatia is a 73 y.o. year old male who presents today for ongoing surveillance of previously identified ascending aortic aneurysm.  They were most recently evaluated in our office with CT imaging in September 2023. He has been followed in our office since August 2022 when an aneurysm was noted on a surveillance CT chest for monitoring of a pulmonary nodule. He has a history of PAF (Xarelto), OA, BPH, colitis, Gilbert's Syndrome, ED, LE venous varicosities, renal cell carcinoma s/p right nephrectomy, CKD3. He saw Dr. Butts in November 2023 and was given a 2 week ambulatory monitor, which demonstrated primarily sinus rhythm, occasional short bursts of SVT.     Patient reports that he continues to feel well. He denies chest pain, back pain, LE edema, dizziness, syncope, numbness/tingling/paresthesias. He maintains an active lifestyle, goes to the gym three days a week doing a combination of strength training and uses the stationary bike. He had bilateral cataract extractions in the last year. He is a former smoker, quit in 2006. He denies family history of SCD, aneurysm or dissection.         Past Medical History:  Past Medical History:   Diagnosis Date    Arthritis     Cancer (HCC)     right kidney     Cellulitis of left leg     June 2016    Chronic kidney disease     Exercises 3 to 4 times per week     Full dentures     History of colonoscopy 2021    Hypertension     Inguinal hernia, right     last assessed: 7/6/2016    Malignant neoplasm of kidney (HCC)     unspecified laterality Managed by Madiha hathaway MD Last assessed: 3/1/2016    Renal cell carcinoma (HCC)     Seasonal allergies     Thrombosis     superficial left leg June 2016    Varicose veins of both lower extremities          Past Surgical History:   Past Surgical History:   Procedure Laterality Date    APPENDECTOMY      COLONOSCOPY       CYSTOSCOPY      Diagnostic    HERNIA REPAIR Right 2016    Procedure: REPAIR HERNIA INGUINAL, LAPAROSCOPIC;  Surgeon: Puneet Moreno MD;  Location: [facility];  Service:     HIP ARTHROPLASTY Left     twice  and     HIP ARTHROPLASTY Right         LAPAROSCOPIC INGUINAL HERNIA REPAIR  2016    description: laparoscopic right inguinal hernia repair with mesh     NEPHRECTOMY Right     due to a tumor    ROTATOR CUFF REPAIR Left     TOTAL HIP ARTHROPLASTY      TOTAL HIP ARTHROPLASTY Bilateral          Family History:  Family History   Problem Relation Age of Onset    Heart attack Mother     Cancer Father          Social History:    Social History     Substance and Sexual Activity   Alcohol Use Never     Social History     Substance and Sexual Activity   Drug Use No     Social History     Tobacco Use   Smoking Status Former    Current packs/day: 0.00    Types: Cigarettes    Quit date: 2006    Years since quittin.1   Smokeless Tobacco Never       Home Medications:   Prior to Admission medications    Medication Sig Start Date End Date Taking? Authorizing Provider   atorvastatin (LIPITOR) 40 mg tablet TAKE 1 TABLET DAILY WITH   DINNER 24  Yes Alex Maradiaga DO   irbesartan (AVAPRO) 150 mg tablet TAKE 1 TABLET BY MOUTH DAILY AT BEDTIME 24  Yes Munira Smalls MD   Xarelto 20 MG tablet TAKE ONE TABLET BY MOUTH EVERY DAY WITH DINNER 24  Yes Munira Smalls MD   Cholecalciferol (Vitamin D) 50 MCG ( UT) tablet Take 2,000 Units by mouth daily  Patient not taking: Reported on 2024    Historical Provider, MD mcmanusmazole-betamethasone (LOTRISONE) 1-0.05 % cream Apply topically 2 (two) times a day  Patient not taking: Reported on 2024   Alex Maradiaga DO       Allergies:  Allergies   Allergen Reactions    Nsaids Other (See Comments)     Instructed to avoid all ibuprofen's/NSAIDS  as pt only has one kidney       Review of Systems:     Review of Systems  "  Constitutional:  Negative for chills and fever.   HENT:  Negative for ear pain and sore throat.    Eyes:  Negative for pain and visual disturbance.   Respiratory:  Negative for cough and shortness of breath.    Cardiovascular:  Negative for chest pain and palpitations.   Gastrointestinal:  Negative for abdominal pain and vomiting.   Genitourinary:  Negative for dysuria and hematuria.   Musculoskeletal:  Negative for arthralgias and back pain.   Skin:  Negative for color change and rash.   Neurological:  Negative for seizures and syncope.   All other systems reviewed and are negative.      Vital Signs:     Vitals:    09/26/24 0959 09/26/24 1008   BP: 156/80 165/77   BP Location: Left arm Right arm   Patient Position: Sitting Sitting   Cuff Size: Large Large   Pulse: (!) 54    SpO2: 100%    Weight: 107 kg (235 lb)    Height: 6' 5\" (1.956 m)        Physical Exam:     Physical Exam  Vitals reviewed.   Constitutional:       Appearance: Normal appearance.   HENT:      Head: Normocephalic and atraumatic.   Eyes:      Pupils: Pupils are equal, round, and reactive to light.   Neck:      Vascular: No carotid bruit or JVD.   Cardiovascular:      Rate and Rhythm: Normal rate and regular rhythm.      Pulses:           Carotid pulses are 2+ on the right side and 2+ on the left side.       Radial pulses are 2+ on the right side and 2+ on the left side.        Dorsalis pedis pulses are 2+ on the right side and 2+ on the left side.      Heart sounds: Normal heart sounds. No murmur heard.     No friction rub. No gallop.   Pulmonary:      Effort: Pulmonary effort is normal.      Breath sounds: Normal breath sounds. No wheezing, rhonchi or rales.   Abdominal:      Palpations: Abdomen is soft.      Tenderness: There is no abdominal tenderness.   Musculoskeletal:      Cervical back: Normal range of motion.   Skin:     General: Skin is warm and dry.      Capillary Refill: Capillary refill takes less than 2 seconds.   Neurological:      " "General: No focal deficit present.      Mental Status: He is alert.      Sensory: Sensation is intact.   Psychiatric:         Attention and Perception: Attention normal.         Mood and Affect: Mood normal.         Behavior: Behavior normal. Behavior is cooperative.         Lab Results:               Invalid input(s): \"LABGLOM\"      Lab Results   Component Value Date    HGBA1C 5.9 (H) 06/12/2024     No results found for: \"CKTOTAL\", \"CKMB\", \"CKMBINDEX\", \"TROPONINI\"    Imaging Studies:     CT Chest: 9/1/24  IMPRESSION:  Unchanged 45 mm ectasia of the ascending aorta.  Stable lung nodules.      Echocardiogram: 7/19/22  Left Ventricle Left ventricular cavity size is normal. Wall thickness is increased. There is mild to moderate concentric hypertrophy. The left ventricular ejection fraction is 60%. Systolic function is normal. Global longitudinal strain is normal at -19.5%. Wall motion is normal. Diastolic function is normal.   Right Ventricle Right ventricular cavity size is normal. Systolic function is normal. Wall thickness is normal.   Left Atrium The atrium is normal in size.   Right Atrium The atrium is normal in size.   Aortic Valve The aortic valve is trileaflet. The leaflets are mildly thickened. The leaflets are not calcified. The leaflets exhibit normal mobility. There is no evidence of regurgitation. The aortic valve has no significant stenosis.   Mitral Valve The mitral valve has normal structure and function. There is no evidence of regurgitation. There is no evidence of stenosis.   Tricuspid Valve Tricuspid valve structure is normal. There is no evidence of regurgitation. There is no evidence of stenosis.   Pulmonic Valve Pulmonic valve structure is normal. There is no evidence of regurgitation. There is no evidence of stenosis.   Ascending Aorta The aortic root is normal in size.   IVC/SVC The inferior vena cava is normal in size.   Pericardium There is no pericardial effusion. The pericardium is normal " in appearance.       Personally reviewed the following image studies in PACS and associated radiology reports: CT chest. My interpretation of the radiology images/reports is: as above.    Assessment:  Patient Active Problem List    Diagnosis Date Noted    Vitamin D deficiency 12/12/2023    Thoracic aortic aneurysm without rupture (HCC) 08/03/2022    Anticoagulated by anticoagulation treatment 07/20/2022    Paroxysmal atrial fibrillation (HCC) 07/18/2022    Abdominal aortic aneurysm (AAA) without rupture (HCC) 03/24/2021    Acquired hypothyroidism 10/22/2019    Gilbert's syndrome 04/12/2019    Personal history of kidney cancer 04/02/2019    Tinnitus 10/05/2018    Inguinal hernia unilateral, non-recurrent 07/21/2016    Colon polyp 04/21/2015    ED (erectile dysfunction) 04/21/2015    Benign prostatic hyperplasia with nocturia 09/03/2013    Gait disturbance 09/03/2013    Left knee pain 07/03/2013    Allergic rhinitis 02/13/2013    Stage 3 chronic kidney disease (HCC) 08/16/2012    Hyperglycemia 08/16/2012    Mixed hyperlipidemia 08/16/2012    Essential hypertension 08/16/2012    Osteoarthritis 08/16/2012     Ascending aortic aneurysm; Ongoing ascending aortic replacement workup    Plan:     CT chest, without contrast performed prior to the visit today was reviewed.  Radiographic findings of ascending aorta aneurysm without significant change (45 mm at it's greatest diameter), were confirmed and shared with the patient today.    cts surveillance plan: The aneurysm size remains unchanged, and does not meet surgical indications for intervention. Therefore follow-up monitoring will be the treatment plan.  Arrangements have been made for future surveillance to be completed with CT chest, without contrast in 1 year.    Kamaljitwil Bhatia was comfortable with our recommendations, and their questions were answered to their satisfaction.  Thank you for allowing us to participate in the care of this patient.     Aortic Aneurysm  Instructions were provided to the patient as follows:    1. No heavy sustained lifting which would require excessive straining  2. Maintain a controlled blood pressure with a goal of 120/80.  3. Follow up in [facility] Clinic as recommended with radiology follow up as instructed  4. Report to the ER or call 911 immediately with the following signs / symptoms: sudden onset of back pain, chest pain or shortness of breath.    The patient recently had a screening colonoscopy in March 2024.  Therefore GI referral is not indicated at this time.     SIGNATURE: Karyna Araiza PA-C  DATE: 09/26/24  TIME: 10:11 AM

## 2024-11-26 ENCOUNTER — HOSPITAL ENCOUNTER (OUTPATIENT)
Dept: NON INVASIVE DIAGNOSTICS | Facility: HOSPITAL | Age: 74
Discharge: HOME | End: 2024-11-26
Payer: COMMERCIAL

## 2024-11-26 DIAGNOSIS — I71.40 ABDOMINAL AORTIC ANEURYSM (AAA) WITHOUT RUPTURE, UNSPECIFIED PART (HCC): ICD-10-CM

## 2024-11-26 DIAGNOSIS — E78.2 MIXED HYPERLIPIDEMIA: ICD-10-CM

## 2024-11-26 PROCEDURE — 93978 VASCULAR STUDY: CPT

## 2024-11-26 PROCEDURE — 93978 VASCULAR STUDY: CPT | Performed by: SURGERY

## 2024-11-26 PROCEDURE — 93922 UPR/L XTREMITY ART 2 LEVELS: CPT | Performed by: SURGERY

## 2024-11-26 PROCEDURE — 93923 UPR/LXTR ART STDY 3+ LVLS: CPT

## 2024-11-27 RX ORDER — ATORVASTATIN CALCIUM 40 MG/1
40 TABLET, FILM COATED ORAL
Qty: 90 TABLET | Refills: 1 | Status: SHIPPED | OUTPATIENT
Start: 2024-11-27

## 2024-12-04 ENCOUNTER — TELEPHONE (OUTPATIENT)
Dept: ADMINISTRATIVE | Facility: HOSPITAL | Age: 74
End: 2024-12-04

## 2024-12-04 NOTE — TELEPHONE ENCOUNTER
Contacted pt and left msg. Pt is on the recall list for a 1 yr RFM RR AOIL 11/26/24 with LYNETTE .

## 2024-12-05 ENCOUNTER — RESULTS FOLLOW-UP (OUTPATIENT)
Dept: OTHER | Facility: HOSPITAL | Age: 74
End: 2024-12-05

## 2024-12-05 ENCOUNTER — APPOINTMENT (OUTPATIENT)
Dept: LAB | Facility: CLINIC | Age: 74
End: 2024-12-05
Payer: COMMERCIAL

## 2024-12-05 DIAGNOSIS — K63.5 POLYP OF COLON, UNSPECIFIED PART OF COLON, UNSPECIFIED TYPE: ICD-10-CM

## 2024-12-05 DIAGNOSIS — R73.9 HYPERGLYCEMIA: ICD-10-CM

## 2024-12-05 DIAGNOSIS — I48.0 PAROXYSMAL ATRIAL FIBRILLATION (HCC): ICD-10-CM

## 2024-12-05 DIAGNOSIS — E78.2 MIXED HYPERLIPIDEMIA: ICD-10-CM

## 2024-12-05 DIAGNOSIS — L30.9 DERMATITIS: ICD-10-CM

## 2024-12-05 DIAGNOSIS — I10 ESSENTIAL HYPERTENSION: ICD-10-CM

## 2024-12-05 DIAGNOSIS — N18.31 STAGE 3A CHRONIC KIDNEY DISEASE (HCC): ICD-10-CM

## 2024-12-05 DIAGNOSIS — Z79.01 ANTICOAGULATED BY ANTICOAGULATION TREATMENT: ICD-10-CM

## 2024-12-05 DIAGNOSIS — E55.9 VITAMIN D DEFICIENCY: ICD-10-CM

## 2024-12-05 DIAGNOSIS — I71.40 ABDOMINAL AORTIC ANEURYSM (AAA) WITHOUT RUPTURE, UNSPECIFIED PART (HCC): ICD-10-CM

## 2024-12-05 DIAGNOSIS — E03.9 ACQUIRED HYPOTHYROIDISM: ICD-10-CM

## 2024-12-05 DIAGNOSIS — E80.4 GILBERT'S SYNDROME: ICD-10-CM

## 2024-12-05 DIAGNOSIS — N40.1 BENIGN PROSTATIC HYPERPLASIA WITH NOCTURIA: ICD-10-CM

## 2024-12-05 DIAGNOSIS — I71.21 ANEURYSM OF ASCENDING AORTA WITHOUT RUPTURE (HCC): ICD-10-CM

## 2024-12-05 DIAGNOSIS — R35.1 BENIGN PROSTATIC HYPERPLASIA WITH NOCTURIA: ICD-10-CM

## 2024-12-05 LAB
25(OH)D3 SERPL-MCNC: 34.9 NG/ML (ref 30–100)
ALBUMIN SERPL BCG-MCNC: 4 G/DL (ref 3.5–5)
ALP SERPL-CCNC: 70 U/L (ref 34–104)
ALT SERPL W P-5'-P-CCNC: 23 U/L (ref 7–52)
ANION GAP SERPL CALCULATED.3IONS-SCNC: 8 MMOL/L (ref 4–13)
AST SERPL W P-5'-P-CCNC: 19 U/L (ref 13–39)
BACTERIA UR QL AUTO: NORMAL /HPF
BILIRUB SERPL-MCNC: 1.17 MG/DL (ref 0.2–1)
BILIRUB UR QL STRIP: NEGATIVE
BUN SERPL-MCNC: 27 MG/DL (ref 5–25)
CALCIUM SERPL-MCNC: 9.6 MG/DL (ref 8.4–10.2)
CHLORIDE SERPL-SCNC: 105 MMOL/L (ref 96–108)
CHOLEST SERPL-MCNC: 118 MG/DL (ref ?–200)
CLARITY UR: CLEAR
CO2 SERPL-SCNC: 27 MMOL/L (ref 21–32)
COLOR UR: ABNORMAL
CREAT SERPL-MCNC: 1.38 MG/DL (ref 0.6–1.3)
ERYTHROCYTE [DISTWIDTH] IN BLOOD BY AUTOMATED COUNT: 12 % (ref 11.6–15.1)
EST. AVERAGE GLUCOSE BLD GHB EST-MCNC: 120 MG/DL
GFR SERPL CREATININE-BSD FRML MDRD: 49 ML/MIN/1.73SQ M
GLUCOSE P FAST SERPL-MCNC: 107 MG/DL (ref 65–99)
GLUCOSE UR STRIP-MCNC: NEGATIVE MG/DL
HBA1C MFR BLD: 5.8 %
HCT VFR BLD AUTO: 41.2 % (ref 36.5–49.3)
HDLC SERPL-MCNC: 44 MG/DL
HGB BLD-MCNC: 13.6 G/DL (ref 12–17)
HGB UR QL STRIP.AUTO: NEGATIVE
KETONES UR STRIP-MCNC: NEGATIVE MG/DL
LDLC SERPL CALC-MCNC: 57 MG/DL (ref 0–100)
LEUKOCYTE ESTERASE UR QL STRIP: NEGATIVE
MCH RBC QN AUTO: 30.4 PG (ref 26.8–34.3)
MCHC RBC AUTO-ENTMCNC: 33 G/DL (ref 31.4–37.4)
MCV RBC AUTO: 92 FL (ref 82–98)
NITRITE UR QL STRIP: NEGATIVE
NON-SQ EPI CELLS URNS QL MICRO: NORMAL /HPF
PH UR STRIP.AUTO: 6 [PH]
PLATELET # BLD AUTO: 311 THOUSANDS/UL (ref 149–390)
PMV BLD AUTO: 9.4 FL (ref 8.9–12.7)
POTASSIUM SERPL-SCNC: 4.6 MMOL/L (ref 3.5–5.3)
PROT SERPL-MCNC: 6.5 G/DL (ref 6.4–8.4)
PROT UR STRIP-MCNC: ABNORMAL MG/DL
RBC # BLD AUTO: 4.48 MILLION/UL (ref 3.88–5.62)
RBC #/AREA URNS AUTO: NORMAL /HPF
SODIUM SERPL-SCNC: 140 MMOL/L (ref 135–147)
SP GR UR STRIP.AUTO: 1.02 (ref 1–1.03)
TRIGL SERPL-MCNC: 86 MG/DL (ref ?–150)
TSH SERPL DL<=0.05 MIU/L-ACNC: 3.74 UIU/ML (ref 0.45–4.5)
UROBILINOGEN UR STRIP-ACNC: <2 MG/DL
WBC # BLD AUTO: 8.22 THOUSAND/UL (ref 4.31–10.16)
WBC #/AREA URNS AUTO: NORMAL /HPF

## 2024-12-05 PROCEDURE — 85027 COMPLETE CBC AUTOMATED: CPT

## 2024-12-05 PROCEDURE — 81001 URINALYSIS AUTO W/SCOPE: CPT

## 2024-12-05 PROCEDURE — 80053 COMPREHEN METABOLIC PANEL: CPT

## 2024-12-05 PROCEDURE — 82306 VITAMIN D 25 HYDROXY: CPT

## 2024-12-05 PROCEDURE — 83036 HEMOGLOBIN GLYCOSYLATED A1C: CPT

## 2024-12-05 PROCEDURE — 36415 COLL VENOUS BLD VENIPUNCTURE: CPT

## 2024-12-05 PROCEDURE — 80061 LIPID PANEL: CPT

## 2024-12-05 PROCEDURE — 84443 ASSAY THYROID STIM HORMONE: CPT

## 2024-12-12 ENCOUNTER — OFFICE VISIT (OUTPATIENT)
Dept: FAMILY MEDICINE CLINIC | Facility: CLINIC | Age: 74
End: 2024-12-12
Payer: COMMERCIAL

## 2024-12-12 VITALS
WEIGHT: 240 LBS | OXYGEN SATURATION: 99 % | HEART RATE: 69 BPM | BODY MASS INDEX: 28.34 KG/M2 | DIASTOLIC BLOOD PRESSURE: 78 MMHG | HEIGHT: 77 IN | SYSTOLIC BLOOD PRESSURE: 130 MMHG

## 2024-12-12 DIAGNOSIS — I48.91 NEW ONSET ATRIAL FIBRILLATION (HCC): ICD-10-CM

## 2024-12-12 DIAGNOSIS — E03.9 ACQUIRED HYPOTHYROIDISM: ICD-10-CM

## 2024-12-12 DIAGNOSIS — R35.1 BENIGN PROSTATIC HYPERPLASIA WITH NOCTURIA: ICD-10-CM

## 2024-12-12 DIAGNOSIS — N18.31 STAGE 3A CHRONIC KIDNEY DISEASE (HCC): ICD-10-CM

## 2024-12-12 DIAGNOSIS — M79.672 PAIN IN BOTH FEET: ICD-10-CM

## 2024-12-12 DIAGNOSIS — Z00.00 HEALTHCARE MAINTENANCE: ICD-10-CM

## 2024-12-12 DIAGNOSIS — Z79.01 ANTICOAGULATED BY ANTICOAGULATION TREATMENT: ICD-10-CM

## 2024-12-12 DIAGNOSIS — E78.2 MIXED HYPERLIPIDEMIA: Primary | ICD-10-CM

## 2024-12-12 DIAGNOSIS — M79.671 PAIN IN BOTH FEET: ICD-10-CM

## 2024-12-12 DIAGNOSIS — R73.9 HYPERGLYCEMIA: ICD-10-CM

## 2024-12-12 DIAGNOSIS — I10 ESSENTIAL HYPERTENSION: ICD-10-CM

## 2024-12-12 DIAGNOSIS — E55.9 VITAMIN D DEFICIENCY: ICD-10-CM

## 2024-12-12 DIAGNOSIS — I48.0 PAROXYSMAL ATRIAL FIBRILLATION (HCC): ICD-10-CM

## 2024-12-12 DIAGNOSIS — I71.40 ABDOMINAL AORTIC ANEURYSM (AAA) WITHOUT RUPTURE, UNSPECIFIED PART (HCC): ICD-10-CM

## 2024-12-12 DIAGNOSIS — N40.1 BENIGN PROSTATIC HYPERPLASIA WITH NOCTURIA: ICD-10-CM

## 2024-12-12 PROCEDURE — 99214 OFFICE O/P EST MOD 30 MIN: CPT | Performed by: FAMILY MEDICINE

## 2024-12-12 PROCEDURE — G0439 PPPS, SUBSEQ VISIT: HCPCS | Performed by: FAMILY MEDICINE

## 2024-12-12 RX ORDER — ATORVASTATIN CALCIUM 40 MG/1
40 TABLET, FILM COATED ORAL
Qty: 90 TABLET | Refills: 3 | Status: SHIPPED | OUTPATIENT
Start: 2024-12-12

## 2024-12-12 RX ORDER — IRBESARTAN 150 MG/1
150 TABLET ORAL
Qty: 90 TABLET | Refills: 3 | Status: SHIPPED | OUTPATIENT
Start: 2024-12-12

## 2024-12-12 NOTE — PATIENT INSTRUCTIONS
Follow with [facility] for foot pain  Continue present therapy  Return in 6 months for office visit and blood work sooner if needed.

## 2024-12-12 NOTE — ASSESSMENT & PLAN NOTE
Will check last PSA at patient's age.  Does follow with urology  Orders:  •  CBC; Future  •  Comprehensive metabolic panel; Future  •  Hemoglobin A1C; Future  •  Lipid Panel with Direct LDL reflex; Future  •  TSH, 3rd generation with Free T4 reflex; Future  •  UA (URINE) with reflex to Scope; Future  •  Vitamin D 25 hydroxy; Future  •  PSA Total, Diagnostic; Future

## 2024-12-12 NOTE — ASSESSMENT & PLAN NOTE
Stable continue to monitor  Orders:  •  CBC; Future  •  Comprehensive metabolic panel; Future  •  Hemoglobin A1C; Future  •  Lipid Panel with Direct LDL reflex; Future  •  TSH, 3rd generation with Free T4 reflex; Future  •  UA (URINE) with reflex to Scope; Future  •  Vitamin D 25 hydroxy; Future  •  PSA Total, Diagnostic; Future

## 2024-12-12 NOTE — ASSESSMENT & PLAN NOTE
Lab Results   Component Value Date    EGFR 49 12/05/2024    EGFR 54 06/12/2024    EGFR 47 01/18/2024    CREATININE 1.38 (H) 12/05/2024    CREATININE 1.29 06/12/2024    CREATININE 1.45 (H) 01/18/2024   Able continue ARB therapy  Orders:  •  CBC; Future  •  Comprehensive metabolic panel; Future  •  Hemoglobin A1C; Future  •  Lipid Panel with Direct LDL reflex; Future  •  TSH, 3rd generation with Free T4 reflex; Future  •  UA (URINE) with reflex to Scope; Future  •  Vitamin D 25 hydroxy; Future  •  PSA Total, Diagnostic; Future

## 2024-12-12 NOTE — ASSESSMENT & PLAN NOTE
Appears resolved  Orders:  •  CBC; Future  •  Comprehensive metabolic panel; Future  •  Hemoglobin A1C; Future  •  Lipid Panel with Direct LDL reflex; Future  •  TSH, 3rd generation with Free T4 reflex; Future  •  UA (URINE) with reflex to Scope; Future  •  Vitamin D 25 hydroxy; Future  •  PSA Total, Diagnostic; Future

## 2024-12-12 NOTE — ASSESSMENT & PLAN NOTE
Stable continue present therapy Avapro reordered  Orders:  •  irbesartan (AVAPRO) 150 mg tablet; Take 1 tablet (150 mg total) by mouth daily at bedtime  •  CBC; Future  •  Comprehensive metabolic panel; Future  •  Hemoglobin A1C; Future  •  Lipid Panel with Direct LDL reflex; Future  •  TSH, 3rd generation with Free T4 reflex; Future  •  UA (URINE) with reflex to Scope; Future  •  Vitamin D 25 hydroxy; Future  •  PSA Total, Diagnostic; Future

## 2024-12-12 NOTE — ASSESSMENT & PLAN NOTE
Follows with cardiology Xarelto reordered  Orders:  •  CBC; Future  •  Comprehensive metabolic panel; Future  •  Hemoglobin A1C; Future  •  Lipid Panel with Direct LDL reflex; Future  •  TSH, 3rd generation with Free T4 reflex; Future  •  UA (URINE) with reflex to Scope; Future  •  Vitamin D 25 hydroxy; Future  •  PSA Total, Diagnostic; Future

## 2024-12-12 NOTE — ASSESSMENT & PLAN NOTE
Diet controlled  Orders:  •  CBC; Future  •  Comprehensive metabolic panel; Future  •  Hemoglobin A1C; Future  •  Lipid Panel with Direct LDL reflex; Future  •  TSH, 3rd generation with Free T4 reflex; Future  •  UA (URINE) with reflex to Scope; Future  •  Vitamin D 25 hydroxy; Future  •  PSA Total, Diagnostic; Future

## 2024-12-12 NOTE — ASSESSMENT & PLAN NOTE
Johnathon follows with vascular surgery  Orders:  •  CBC; Future  •  Comprehensive metabolic panel; Future  •  Hemoglobin A1C; Future  •  Lipid Panel with Direct LDL reflex; Future  •  TSH, 3rd generation with Free T4 reflex; Future  •  UA (URINE) with reflex to Scope; Future  •  Vitamin D 25 hydroxy; Future  •  PSA Total, Diagnostic; Future

## 2024-12-12 NOTE — ASSESSMENT & PLAN NOTE
Xarelto was reordered  Orders:  •  CBC; Future  •  Comprehensive metabolic panel; Future  •  Hemoglobin A1C; Future  •  Lipid Panel with Direct LDL reflex; Future  •  TSH, 3rd generation with Free T4 reflex; Future  •  UA (URINE) with reflex to Scope; Future  •  Vitamin D 25 hydroxy; Future  •  PSA Total, Diagnostic; Future

## 2024-12-12 NOTE — PROGRESS NOTES
Name: Kamaljit Bhatia      : 1950      MRN: [id number]  Encounter Provider: Alex Maradiaga DO  Encounter Date: 2024   Encounter department: [facility] PRIMARY CARE  1.  Hyperlipidemia, stable atorvastatin reordered  2.  PRID hypertension, stable Avapro reordered  3.  A-fib/anticoagulated, stable follows with cardiology Xarelto reordered #4.  Of care maintenance Medicare AWV completed  5.  Bilateral [facility] pain [facility]  6.  PRID hypothyroidism, resolved TSH normal without medication  7.  CKD-3, stable continue ARB  8.  BPH, will check PSA next blood work that should be patient's last PSA secondary to age follows with urology #9 hyperglycemia diet controlled  10.  Vitamin D deficiency, normal continue to monitor #11.  Return in 6 months for office visit and blood work sooner if needed.    Assessment & Plan  Mixed hyperlipidemia  Able continue present therapy atorvastatin reordered  Orders:  •  atorvastatin (LIPITOR) 40 mg tablet; Take 1 tablet (40 mg total) by mouth daily with dinner  •  CBC; Future  •  Comprehensive metabolic panel; Future  •  Hemoglobin A1C; Future  •  Lipid Panel with Direct LDL reflex; Future  •  TSH, 3rd generation with Free T4 reflex; Future  •  UA (URINE) with reflex to Scope; Future  •  Vitamin D 25 hydroxy; Future  •  PSA Total, Diagnostic; Future    Essential hypertension  Stable continue present therapy Avapro reordered  Orders:  •  irbesartan (AVAPRO) 150 mg tablet; Take 1 tablet (150 mg total) by mouth daily at bedtime  •  CBC; Future  •  Comprehensive metabolic panel; Future  •  Hemoglobin A1C; Future  •  Lipid Panel with Direct LDL reflex; Future  •  TSH, 3rd generation with Free T4 reflex; Future  •  UA (URINE) with reflex to Scope; Future  •  Vitamin D 25 hydroxy; Future  •  PSA Total, Diagnostic; Future    New onset atrial fibrillation (HCC)    Orders:  •  rivaroxaban (Xarelto) 20 mg tablet; Take 1 tablet (20 mg  total) by mouth daily with dinner  •  CBC; Future  •  Comprehensive metabolic panel; Future  •  Hemoglobin A1C; Future  •  Lipid Panel with Direct LDL reflex; Future  •  TSH, 3rd generation with Free T4 reflex; Future  •  UA (URINE) with reflex to Scope; Future  •  Vitamin D 25 hydroxy; Future  •  PSA Total, Diagnostic; Future    Healthcare maintenance  AWV completed  Orders:  •  CBC; Future  •  Comprehensive metabolic panel; Future  •  Hemoglobin A1C; Future  •  Lipid Panel with Direct LDL reflex; Future  •  TSH, 3rd generation with Free T4 reflex; Future  •  UA (URINE) with reflex to Scope; Future  •  Vitamin D 25 hydroxy; Future  •  PSA Total, Diagnostic; Future    Paroxysmal atrial fibrillation (HCC)  Follows with cardiology Xarelto reordered  Orders:  •  CBC; Future  •  Comprehensive metabolic panel; Future  •  Hemoglobin A1C; Future  •  Lipid Panel with Direct LDL reflex; Future  •  TSH, 3rd generation with Free T4 reflex; Future  •  UA (URINE) with reflex to Scope; Future  •  Vitamin D 25 hydroxy; Future  •  PSA Total, Diagnostic; Future    Abdominal aortic aneurysm (AAA) without rupture, unspecified part (HCC)  Johnathon follows with vascular surgery  Orders:  •  CBC; Future  •  Comprehensive metabolic panel; Future  •  Hemoglobin A1C; Future  •  Lipid Panel with Direct LDL reflex; Future  •  TSH, 3rd generation with Free T4 reflex; Future  •  UA (URINE) with reflex to Scope; Future  •  Vitamin D 25 hydroxy; Future  •  PSA Total, Diagnostic; Future    Pain in both feet  [facility]  Orders:  •  Ambulatory Referral to Podiatry; Future  •  CBC; Future  •  Comprehensive metabolic panel; Future  •  Hemoglobin A1C; Future  •  Lipid Panel with Direct LDL reflex; Future  •  TSH, 3rd generation with Free T4 reflex; Future  •  UA (URINE) with reflex to Scope; Future  •  Vitamin D 25 hydroxy; Future  •  PSA Total, Diagnostic; Future    Acquired hypothyroidism  Appears  resolved  Orders:  •  CBC; Future  •  Comprehensive metabolic panel; Future  •  Hemoglobin A1C; Future  •  Lipid Panel with Direct LDL reflex; Future  •  TSH, 3rd generation with Free T4 reflex; Future  •  UA (URINE) with reflex to Scope; Future  •  Vitamin D 25 hydroxy; Future  •  PSA Total, Diagnostic; Future    Stage 3a chronic kidney disease (HCC)  Lab Results   Component Value Date    EGFR 49 12/05/2024    EGFR 54 06/12/2024    EGFR 47 01/18/2024    CREATININE 1.38 (H) 12/05/2024    CREATININE 1.29 06/12/2024    CREATININE 1.45 (H) 01/18/2024   Able continue ARB therapy  Orders:  •  CBC; Future  •  Comprehensive metabolic panel; Future  •  Hemoglobin A1C; Future  •  Lipid Panel with Direct LDL reflex; Future  •  TSH, 3rd generation with Free T4 reflex; Future  •  UA (URINE) with reflex to Scope; Future  •  Vitamin D 25 hydroxy; Future  •  PSA Total, Diagnostic; Future    Anticoagulated by anticoagulation treatment  Xarelto was reordered  Orders:  •  CBC; Future  •  Comprehensive metabolic panel; Future  •  Hemoglobin A1C; Future  •  Lipid Panel with Direct LDL reflex; Future  •  TSH, 3rd generation with Free T4 reflex; Future  •  UA (URINE) with reflex to Scope; Future  •  Vitamin D 25 hydroxy; Future  •  PSA Total, Diagnostic; Future    Benign prostatic hyperplasia with nocturia  Will check last PSA at patient's age.  Does follow with urology  Orders:  •  CBC; Future  •  Comprehensive metabolic panel; Future  •  Hemoglobin A1C; Future  •  Lipid Panel with Direct LDL reflex; Future  •  TSH, 3rd generation with Free T4 reflex; Future  •  UA (URINE) with reflex to Scope; Future  •  Vitamin D 25 hydroxy; Future  •  PSA Total, Diagnostic; Future    Hyperglycemia  Diet controlled  Orders:  •  CBC; Future  •  Comprehensive metabolic panel; Future  •  Hemoglobin A1C; Future  •  Lipid Panel with Direct LDL reflex; Future  •  TSH, 3rd generation with Free T4 reflex; Future  •  UA (URINE) with reflex to Scope; Future  •   Vitamin D 25 hydroxy; Future  •  PSA Total, Diagnostic; Future    Vitamin D deficiency  Stable continue to monitor  Orders:  •  CBC; Future  •  Comprehensive metabolic panel; Future  •  Hemoglobin A1C; Future  •  Lipid Panel with Direct LDL reflex; Future  •  TSH, 3rd generation with Free T4 reflex; Future  •  UA (URINE) with reflex to Scope; Future  •  Vitamin D 25 hydroxy; Future  •  PSA Total, Diagnostic; Future      Depression Screening and Follow-up Plan: Patient was screened for depression during today's encounter. They screened negative with a PHQ-2 score of 0.      Preventive health issues were discussed with patient, and age appropriate screening tests were ordered as noted in patient's After Visit Summary. Personalized health advice and appropriate referrals for health education or preventive services given if needed, as noted in patient's After Visit Summary.    History of Present Illness     Patient doing well.  His only complaint is some foot pain would like to see [facility], podiatric center.  I will place an order.  Patient gained 5 pounds, blood work was discussed       Patient Care Team:  Alex Maradiaga DO as PCP - General (Family Medicine)  Cindy Barney PA-C (Inactive) as Physician Assistant (Vascular Surgery)  Katie Jurado MD (Vascular Surgery)    Review of Systems   Constitutional: Negative.    HENT: Negative.     Eyes: Negative.    Respiratory: Negative.     Cardiovascular: Negative.    Gastrointestinal: Negative.    Endocrine: Negative.    Genitourinary: Negative.    Musculoskeletal:         HPI   Skin: Negative.    Allergic/Immunologic: Negative.    Neurological: Negative.    Hematological: Negative.    Psychiatric/Behavioral: Negative.       Medical History Reviewed by provider this encounter:  Tobacco  Allergies  Meds  Problems  Med Hx  Surg Hx  Fam Hx       Annual Wellness Visit Questionnaire   Kamaljit is here for his Subsequent Wellness visit.     [facility]  Risk Assessment:   Patient rates overall health as fair. Patient feels that their physical health rating is same. Patient is satisfied with their life. Eyesight was rated as slightly better. Hearing was rated as same. Patient feels that their emotional and mental health rating is same. Patients states they are sometimes angry. Patient states they are sometimes unusually tired/fatigued. Pain experienced in the last 7 days has been some. Patient's pain rating has been 5/10. Patient states that he has experienced no weight loss or gain in last 6 months.     Depression Screening:   PHQ-2 Score: 0      Fall Risk Screening:   In the past year, patient has experienced: no history of falling in past year      Home Safety:  Patient does not have trouble with stairs inside or outside of their home. Patient has working smoke alarms and has working carbon monoxide detector. Home safety hazards include: none.     Nutrition:   Current diet is Regular.     Medications:   Patient is currently taking over-the-counter supplements. OTC medications include: see medication list. Patient is able to manage medications.     Activities of Daily Living (ADLs)/Instrumental Activities of Daily Living (IADLs):   Walk and transfer into and out of bed and chair?: Yes  Dress and groom yourself?: Yes    Bathe or shower yourself?: Yes    Feed yourself? Yes  Do your laundry/housekeeping?: Yes  Manage your money, pay your bills and track your expenses?: Yes  Make your own meals?: Yes    Do your own shopping?: Yes    Previous Hospitalizations:   Any hospitalizations or ED visits within the last 12 months?: No      Advance Care Planning:   Living will: Yes    Advanced directive: Yes    Advanced directive counseling given: Yes    Five wishes given: No    Patient declined ACP directive: No    End of Life Decisions reviewed with patient: Yes    Provider agrees with end of life decisions: No      Cognitive Screening:   Provider or family/friend/caregiver  concerned regarding cognition?: No    PREVENTIVE SCREENINGS      Cardiovascular Screening:    General: Screening Not Indicated and History Lipid Disorder      Diabetes Screening:     General: Screening Current      Colorectal Cancer Screening:     General: Screening Current      Prostate Cancer Screening:    General: Screening Not Indicated      Osteoporosis Screening:    General: Screening Not Indicated      Abdominal Aortic Aneurysm (AAA) Screening:    Risk factors include: age between 65-76 yo and tobacco use        General: Screening Not Indicated and History AAA      Lung Cancer Screening:     General: Screening Not Indicated      Hepatitis C Screening:    General: Screening Current    Screening, Brief Intervention, and Referral to Treatment (SBIRT)    Screening    Typical number of drinks in a week: 0    Single Item Drug Screening:  How often have you used an illegal drug (including marijuana) or a prescription medication for non-medical reasons in the past year? never    Single Item Drug Screen Score: 0  Interpretation: Negative screen for possible drug use disorder    Social Drivers of Health     Financial Resource Strain: Low Risk  (12/12/2023)    Overall Financial Resource Strain (CARDIA)    • Difficulty of Paying Living Expenses: Not hard at all   Food Insecurity: No Food Insecurity (12/12/2024)    Hunger Vital Sign    • Worried About Running Out of Food in the Last Year: Never true    • Ran Out of Food in the Last Year: Never true   Transportation Needs: No Transportation Needs (12/12/2024)    PRAPARE - Transportation    • Lack of Transportation (Medical): No    • Lack of Transportation (Non-Medical): No   Housing Stability: Low Risk  (12/12/2024)    Housing Stability Vital Sign    • Unable to Pay for Housing in the Last Year: No    • Number of Times Moved in the Last Year: 1    • Homeless in the Last Year: No   Utilities: Not At Risk (12/12/2024)    [facility] Utilities    • Threatened with loss of utilities:  "No     No results found.    Objective   /78   Pulse 69   Ht 6' 5\" (1.956 m)   Wt 109 kg (240 lb)   SpO2 99%   BMI 28.46 kg/m²     Physical Exam  Vitals and nursing note reviewed.   Constitutional:       Appearance: Normal appearance.   HENT:      Head: Normocephalic and atraumatic.      Mouth/Throat:      Mouth: Mucous membranes are moist.   Eyes:      General: No scleral icterus.  Neck:      Vascular: No carotid bruit.   Cardiovascular:      Rate and Rhythm: Normal rate and regular rhythm.      Heart sounds: Normal heart sounds.   Pulmonary:      Effort: Pulmonary effort is normal.      Breath sounds: Normal breath sounds.   Abdominal:      General: Bowel sounds are normal.      Palpations: Abdomen is soft.      Tenderness: There is no abdominal tenderness.   Musculoskeletal:      Cervical back: Neck supple.      Right lower leg: No edema.      Left lower leg: No edema.   Skin:     General: Skin is warm and dry.   Neurological:      General: No focal deficit present.      Mental Status: He is alert.   Psychiatric:         Mood and Affect: Mood normal.         "

## 2024-12-12 NOTE — ASSESSMENT & PLAN NOTE
Able continue present therapy atorvastatin reordered  Orders:  •  atorvastatin (LIPITOR) 40 mg tablet; Take 1 tablet (40 mg total) by mouth daily with dinner  •  CBC; Future  •  Comprehensive metabolic panel; Future  •  Hemoglobin A1C; Future  •  Lipid Panel with Direct LDL reflex; Future  •  TSH, 3rd generation with Free T4 reflex; Future  •  UA (URINE) with reflex to Scope; Future  •  Vitamin D 25 hydroxy; Future  •  PSA Total, Diagnostic; Future

## 2025-01-21 ENCOUNTER — OFFICE VISIT (OUTPATIENT)
Dept: CARDIOLOGY CLINIC | Facility: CLINIC | Age: 75
End: 2025-01-21
Payer: COMMERCIAL

## 2025-01-21 ENCOUNTER — TELEPHONE (OUTPATIENT)
Dept: CARDIOLOGY CLINIC | Facility: CLINIC | Age: 75
End: 2025-01-21

## 2025-01-21 VITALS
HEART RATE: 72 BPM | DIASTOLIC BLOOD PRESSURE: 90 MMHG | WEIGHT: 239 LBS | SYSTOLIC BLOOD PRESSURE: 160 MMHG | HEIGHT: 77 IN | BODY MASS INDEX: 28.22 KG/M2

## 2025-01-21 DIAGNOSIS — I71.21 ANEURYSM OF ASCENDING AORTA WITHOUT RUPTURE (HCC): ICD-10-CM

## 2025-01-21 DIAGNOSIS — I71.40 ABDOMINAL AORTIC ANEURYSM (AAA) WITHOUT RUPTURE, UNSPECIFIED PART (HCC): Primary | ICD-10-CM

## 2025-01-21 DIAGNOSIS — I10 ESSENTIAL HYPERTENSION: ICD-10-CM

## 2025-01-21 DIAGNOSIS — I48.0 PAROXYSMAL ATRIAL FIBRILLATION (HCC): ICD-10-CM

## 2025-01-21 PROCEDURE — 99214 OFFICE O/P EST MOD 30 MIN: CPT | Performed by: INTERNAL MEDICINE

## 2025-01-21 NOTE — PROGRESS NOTES
Cardiology             Kamaljit MATHIEU Bhatia  1950  [id number]              Assessment/Plan:    Paroxysmal atrial fibrillation, diagnosed in 2022, converted to sinus rhythm spontaneously at that time without recurrence  Ascending aortic aneurysm, 4.5 cm by CT chest 9/1/2024, stable  Hypertension  Dyslipidemia  Right renal cell carcinoma status post right nephrectomy in 2006  CKD  Hypothyroidism        No recurrent atrial fibrillation since 2022.  Prior Zio monitor 12/12/2023 demonstrated no recurrent atrial fibrillation.  The patient has not had any recurrent symptoms.  Continue Xarelto anticoagulation  Blood pressure elevated which he attributes to rushing into his appointment today.  Advised home blood pressure monitoring and he is in agreement.  Continue irbesartan 150 mg daily  Continue atorvastatin 40 mg daily.  Prior lipid panel reviewed from 12/2024, well-controlled          Follow-up in 1 year with Dr. Butts          Interval History:     This is a very pleasant 74-year-old male diagnosed with paroxysmal atrial fibrillation in 2022.  At that time he was doing a crossword puzzle at home and felt unwell.  He checked his pulse and felt it to be irregular.  He presented to the urgent care center where EKG confirmed atrial fibrillation.  By the time he got to the ER he had converted back to sinus rhythm.  He was placed on Xarelto at that time and has been following Dr. Butts.  He also has a history of hypertension, dyslipidemia, renal cell carcinoma status post right nephrectomy in 2006, CKD, hypothyroidism and thoracic aortic ectasia.    He last saw Dr. Butts in 2023 at which time he was doing well.    Zio monitor 12/12/2023 demonstrate no evidence of atrial fibrillation.  2 triggered events correlate with sinus rhythm.    He presents today for evaluation with me while his primary cardiologist called out sick.  From a symptomatic standpoint he feels well without chest pain, shortness of breath,  "dizziness, palpitations, lower extremity edema.  He has no complaints on today's visit.  He is tolerating Xarelto without abnormal bleeding or heavy bruising.              Vitals:  Vitals:    25 0841   BP: 160/90   BP Location: Left arm   Patient Position: Sitting   Cuff Size: Standard   Pulse: 72   Weight: 108 kg (239 lb)   Height: 6' 5\" (1.956 m)         Past Medical History:   Diagnosis Date   • Arthritis    • Cancer (HCC)     right kidney    • Cellulitis of left leg     2016   • Chronic kidney disease    • Exercises 3 to 4 times per week    • Full dentures    • History of colonoscopy    • Hypertension    • Inguinal hernia, right     last assessed: 2016   • Malignant neoplasm of kidney (HCC)     unspecified laterality Managed by Madiha hathaway MD Last assessed: 3/1/2016   • Renal cell carcinoma (HCC)    • Seasonal allergies    • Thrombosis     superficial left leg 2016   • Varicose veins of both lower extremities      Social History     Socioeconomic History   • Marital status: /Civil Union     Spouse name: Not on file   • Number of children: Not on file   • Years of education: Not on file   • Highest education level: Not on file   Occupational History   • Not on file   Tobacco Use   • Smoking status: Former     Current packs/day: 0.00     Types: Cigarettes     Quit date: 2006     Years since quittin.4   • Smokeless tobacco: Never   Vaping Use   • Vaping status: Never Used   Substance and Sexual Activity   • Alcohol use: Never   • Drug use: No   • Sexual activity: Not on file   Other Topics Concern   • Not on file   Social History Narrative   • Not on file     Social Drivers of Health     Financial Resource Strain: Low Risk  (2023)    Overall Financial Resource Strain (CARDIA)    • Difficulty of Paying Living Expenses: Not hard at all   Food Insecurity: No Food Insecurity (2024)    Hunger Vital Sign    • Worried About Running Out of Food in the Last Year: Never " true    • Ran Out of Food in the Last Year: Never true   Transportation Needs: No Transportation Needs (12/12/2024)    PRAPARE - Transportation    • Lack of Transportation (Medical): No    • Lack of Transportation (Non-Medical): No   Physical Activity: Not on file   Stress: Not on file   Social Connections: Not on file   Intimate Partner Violence: Not on file   Housing Stability: Low Risk  (12/12/2024)    Housing Stability Vital Sign    • Unable to Pay for Housing in the Last Year: No    • Number of Times Moved in the Last Year: 1    • Homeless in the Last Year: No      Family History   Problem Relation Age of Onset   • Heart attack Mother    • Cancer Father      Past Surgical History:   Procedure Laterality Date   • APPENDECTOMY     • COLONOSCOPY     • CYSTOSCOPY      Diagnostic   • HERNIA REPAIR Right 7/21/2016    Procedure: REPAIR HERNIA INGUINAL, LAPAROSCOPIC;  Surgeon: Puneet Moreno MD;  Location: [facility];  Service:    • HIP ARTHROPLASTY Left     twice 1999 and 2011   • HIP ARTHROPLASTY Right     1999   • LAPAROSCOPIC INGUINAL HERNIA REPAIR  07/21/2016    description: laparoscopic right inguinal hernia repair with mesh    • NEPHRECTOMY Right     due to a tumor   • ROTATOR CUFF REPAIR Left    • TOTAL HIP ARTHROPLASTY     • TOTAL HIP ARTHROPLASTY Bilateral        Current Outpatient Medications:   •  atorvastatin (LIPITOR) 40 mg tablet, Take 1 tablet (40 mg total) by mouth daily with dinner, Disp: 90 tablet, Rfl: 3  •  irbesartan (AVAPRO) 150 mg tablet, Take 1 tablet (150 mg total) by mouth daily at bedtime, Disp: 90 tablet, Rfl: 3  •  rivaroxaban (Xarelto) 20 mg tablet, Take 1 tablet (20 mg total) by mouth daily with dinner, Disp: 90 tablet, Rfl: 3        Review of Systems:  Review of Systems   Respiratory: Negative.     Cardiovascular: Negative.    All other systems reviewed and are negative.        Physical Exam:  Physical Exam  Constitutional:       General: He is not in acute distress.     Appearance: He  is well-developed. He is not diaphoretic.   HENT:      Head: Normocephalic and atraumatic.   Eyes:      General: No scleral icterus.        Right eye: No discharge.      Pupils: Pupils are equal, round, and reactive to light.   Neck:      Thyroid: No thyromegaly.   Cardiovascular:      Rate and Rhythm: Normal rate and regular rhythm.      Heart sounds: Normal heart sounds. No murmur heard.     No friction rub. No gallop.   Pulmonary:      Effort: Pulmonary effort is normal.      Breath sounds: Normal breath sounds.   Abdominal:      General: There is no distension.      Tenderness: There is no abdominal tenderness. There is no guarding or rebound.   Musculoskeletal:         General: Normal range of motion.      Cervical back: Normal range of motion and neck supple.   Skin:     General: Skin is warm and dry.      Coloration: Skin is not pale.      Findings: No erythema or rash.   Neurological:      Mental Status: He is alert and oriented to person, place, and time.      Coordination: Coordination normal.      Gait: Gait abnormal.   Psychiatric:         Behavior: Behavior normal.         Thought Content: Thought content normal.         Judgment: Judgment normal.         This note was completed in part utilizing [employer] Direct Software.  Grammatical errors, random word insertions, spelling mistakes, and incomplete sentences can be an occasional consequence of this system secondary to software limitations, ambient noise, and hardware issues.  If you have any questions or concerns about the content, text, or information contained within the body of this dictation, please contact the provider for clarification.

## 2025-02-18 ENCOUNTER — OFFICE VISIT (OUTPATIENT)
Dept: VASCULAR SURGERY | Facility: CLINIC | Age: 75
End: 2025-02-18
Payer: COMMERCIAL

## 2025-02-18 VITALS
OXYGEN SATURATION: 99 % | HEART RATE: 65 BPM | DIASTOLIC BLOOD PRESSURE: 84 MMHG | BODY MASS INDEX: 28.57 KG/M2 | TEMPERATURE: 98.3 F | WEIGHT: 242 LBS | SYSTOLIC BLOOD PRESSURE: 148 MMHG | HEIGHT: 77 IN

## 2025-02-18 DIAGNOSIS — I71.40 ABDOMINAL AORTIC ANEURYSM (AAA) WITHOUT RUPTURE, UNSPECIFIED PART (HCC): Primary | ICD-10-CM

## 2025-02-18 DIAGNOSIS — I10 ESSENTIAL HYPERTENSION: ICD-10-CM

## 2025-02-18 DIAGNOSIS — E78.2 MIXED HYPERLIPIDEMIA: ICD-10-CM

## 2025-02-18 PROCEDURE — 99214 OFFICE O/P EST MOD 30 MIN: CPT

## 2025-02-18 NOTE — PROGRESS NOTES
Name: Kamaljit Bhatia      : 1950      MRN: [id number]  Encounter Provider: SHAYNE Akins  Encounter Date: 2025   Encounter department: [facility] [place]  :  Assessment & Plan  Abdominal aortic aneurysm (AAA) without rupture, unspecified part (HCC)  Patient reports that he has been doing well since his last office visit.  He is denying any abdominal pain or low back pain.  Additionally, patient is denying any lower extremity claudication, rest pain, or tissue loss.  He does note occasional bilateral lower extremity swelling, however this is not new.  Patient is active and independent with ADLs.    Imaging:  AOIL 2024:  The abdominal aortic is aneurysmal measuring 3.3cm in its greatest diameter.  The right proximal EMMA is aneurysmal, measuring 2.1 cm. The left distal EMMA is aneurysmal measuring 2.3 cm in caliber.  The bilateral EIA are patent and normal in caliber. The celiac and SMA are patent. The proximal renal arteries are patent.  R CARLENE: 1.09/239/145, L CARLENE: 1.20/-/112    Plan:  -We discussed the pathophysiology of aneurysmal disease and contributing lifestyle factors, as well as indications for intervention including size>=5.5 cm, growth >0.5cm in 6 months.   -We discussed the results of AOIL at length. No significant change in size of AAA and bilateral EMMA aneurysms  -Continue medical optimization with atorvastatin  -Will repeat AOIL in 1 year  -Instructed patient to report to the ED for any sudden onset abdominal pain or low back pain  -Follow up in the office in 1 year      Orders:    VAS ABDOMINAL AORTA/ILIACS; WITH CARLENE'S; Future    Essential hypertension  Blood pressure is mildly elevated in the office today, 148/84.    -Continue medical management per PCP.  -Low-salt diet         Mixed hyperlipidemia  Continue atorvastatin         History of Present Illness   Kamaljit Bhatia is a 74 y.o. male, former smoker, with PMH HTN, HLD, CKD 3, renal cancer, Gilbert's  syndrome, osteoarthritis, paroxysmal atrial fibrillation (on Xarelto), and AAA.  Patient is presenting to the vascular surgery office to review results of AO IL completed 11/26/2024.    Patient reports that he has been doing well since his last office visit.  He is denying any abdominal pain or low back pain.  Additionally, patient is denying any lower extremity claudication, rest pain, or tissue loss.  He does note occasional bilateral lower extremity swelling, however this is not new.  Patient is active and independent with ADLs.    History obtained from: patient    Review of Systems   Constitutional: Negative.    HENT: Negative.     Eyes: Negative.    Respiratory: Negative.     Cardiovascular:  Positive for leg swelling.   Gastrointestinal: Negative.  Negative for abdominal pain.   Endocrine: Negative.    Genitourinary: Negative.    Musculoskeletal:  Negative for back pain.   Skin: Negative.    Allergic/Immunologic: Negative.    Neurological: Negative.    Hematological: Negative.    Psychiatric/Behavioral: Negative.       Pertinent Medical History   Past Medical History:   Diagnosis Date    Arthritis     Cancer (HCC)     right kidney     Cellulitis of left leg     June 2016    Chronic kidney disease     Exercises 3 to 4 times per week     Full dentures     History of colonoscopy 2021    Hypertension     Inguinal hernia, right     last assessed: 7/6/2016    Malignant neoplasm of kidney (HCC)     unspecified laterality Managed by Madiha hathaway MD Last assessed: 3/1/2016    Renal cell carcinoma (HCC)     Seasonal allergies     Thrombosis     superficial left leg June 2016    Varicose veins of both lower extremities          Medical History Reviewed by provider this encounter:     .  Current Outpatient Medications on File Prior to Visit   Medication Sig Dispense Refill    atorvastatin (LIPITOR) 40 mg tablet Take 1 tablet (40 mg total) by mouth daily with dinner 90 tablet 3    irbesartan (AVAPRO) 150 mg tablet Take  "1 tablet (150 mg total) by mouth daily at bedtime 90 tablet 3    rivaroxaban (Xarelto) 20 mg tablet Take 1 tablet (20 mg total) by mouth daily with dinner 90 tablet 3     No current facility-administered medications on file prior to visit.      Social History     Tobacco Use    Smoking status: Former     Current packs/day: 0.00     Types: Cigarettes     Quit date: 2006     Years since quittin.5    Smokeless tobacco: Never   Vaping Use    Vaping status: Never Used   Substance and Sexual Activity    Alcohol use: Never    Drug use: No    Sexual activity: Not on file        Objective   /84 (BP Location: Left arm)   Pulse 65   Temp 98.3 °F (36.8 °C) (Temporal)   Ht 6' 5\" (1.956 m)   Wt 110 kg (242 lb)   SpO2 99%   BMI 28.70 kg/m²      Physical Exam  Vitals and nursing note reviewed.   Constitutional:       General: He is not in acute distress.     Appearance: Normal appearance. He is well-developed.   HENT:      Head: Normocephalic and atraumatic.   Eyes:      Conjunctiva/sclera: Conjunctivae normal.   Cardiovascular:      Rate and Rhythm: Normal rate and regular rhythm.      Pulses:           Radial pulses are 2+ on the right side and 2+ on the left side.        Posterior tibial pulses are 2+ on the right side and 2+ on the left side.      Heart sounds: Normal heart sounds. No murmur heard.  Pulmonary:      Effort: Pulmonary effort is normal. No respiratory distress.      Breath sounds: Normal breath sounds.   Abdominal:      General: There is no abdominal bruit.      Palpations: Abdomen is soft. There is no pulsatile mass.      Tenderness: There is no abdominal tenderness.   Musculoskeletal:         General: No swelling or tenderness.      Cervical back: Neck supple.      Right lower leg: Edema present.      Left lower leg: Edema present.   Skin:     General: Skin is warm and dry.      Capillary Refill: Capillary refill takes less than 2 seconds.   Neurological:      General: No focal deficit " present.      Mental Status: He is alert and oriented to person, place, and time.   Psychiatric:         Mood and Affect: Mood normal.         Behavior: Behavior normal.         Administrative Statements   I have spent a total time of 30 minutes in caring for this patient on the day of the visit/encounter including Diagnostic results, Prognosis, Risks and benefits of tx options, Instructions for management, Patient and family education, Importance of tx compliance, Risk factor reductions, Impressions, Counseling / Coordination of care, Documenting in the medical record, Reviewing/placing orders in the medical record (including tests, medications, and/or procedures), and Obtaining or reviewing history  .

## 2025-02-18 NOTE — ASSESSMENT & PLAN NOTE
Patient reports that he has been doing well since his last office visit.  He is denying any abdominal pain or low back pain.  Additionally, patient is denying any lower extremity claudication, rest pain, or tissue loss.  He does note occasional bilateral lower extremity swelling, however this is not new.  Patient is active and independent with ADLs.    Imaging:  AOIL 11/26/2024:  The abdominal aortic is aneurysmal measuring 3.3cm in its greatest diameter.  The right proximal EMMA is aneurysmal, measuring 2.1 cm. The left distal EMMA is aneurysmal measuring 2.3 cm in caliber.  The bilateral EIA are patent and normal in caliber. The celiac and SMA are patent. The proximal renal arteries are patent.  R CARLENE: 1.09/239/145, L CARLENE: 1.20/-/112    Plan:  -We discussed the pathophysiology of aneurysmal disease and contributing lifestyle factors, as well as indications for intervention including size>=5.5 cm, growth >0.5cm in 6 months.   -We discussed the results of AOIL at length. No significant change in size of AAA and bilateral EMMA aneurysms  -Continue medical optimization with atorvastatin  -Will repeat AOIL in 1 year  -Instructed patient to report to the ED for any sudden onset abdominal pain or low back pain  -Follow up in the office in 1 year      Orders:    VAS ABDOMINAL AORTA/ILIACS; WITH CARLENE'S; Future

## 2025-02-18 NOTE — ASSESSMENT & PLAN NOTE
Blood pressure is mildly elevated in the office today, 148/84.    -Continue medical management per PCP.  -Low-salt diet

## 2025-06-12 ENCOUNTER — APPOINTMENT (OUTPATIENT)
Dept: LAB | Facility: CLINIC | Age: 75
End: 2025-06-12
Payer: COMMERCIAL

## 2025-06-12 DIAGNOSIS — M79.672 PAIN IN BOTH FEET: ICD-10-CM

## 2025-06-12 DIAGNOSIS — I48.0 PAROXYSMAL ATRIAL FIBRILLATION (HCC): ICD-10-CM

## 2025-06-12 DIAGNOSIS — R73.9 HYPERGLYCEMIA: ICD-10-CM

## 2025-06-12 DIAGNOSIS — E78.2 MIXED HYPERLIPIDEMIA: ICD-10-CM

## 2025-06-12 DIAGNOSIS — N18.31 STAGE 3A CHRONIC KIDNEY DISEASE (HCC): ICD-10-CM

## 2025-06-12 DIAGNOSIS — I71.40 ABDOMINAL AORTIC ANEURYSM (AAA) WITHOUT RUPTURE, UNSPECIFIED PART (HCC): ICD-10-CM

## 2025-06-12 DIAGNOSIS — Z00.00 HEALTHCARE MAINTENANCE: ICD-10-CM

## 2025-06-12 DIAGNOSIS — I10 ESSENTIAL HYPERTENSION: ICD-10-CM

## 2025-06-12 DIAGNOSIS — N40.1 BENIGN PROSTATIC HYPERPLASIA WITH NOCTURIA: ICD-10-CM

## 2025-06-12 DIAGNOSIS — E55.9 VITAMIN D DEFICIENCY: ICD-10-CM

## 2025-06-12 DIAGNOSIS — E03.9 ACQUIRED HYPOTHYROIDISM: ICD-10-CM

## 2025-06-12 DIAGNOSIS — R35.1 BENIGN PROSTATIC HYPERPLASIA WITH NOCTURIA: ICD-10-CM

## 2025-06-12 DIAGNOSIS — Z79.01 ANTICOAGULATED BY ANTICOAGULATION TREATMENT: ICD-10-CM

## 2025-06-12 DIAGNOSIS — I48.91 NEW ONSET ATRIAL FIBRILLATION (HCC): ICD-10-CM

## 2025-06-12 DIAGNOSIS — M79.671 PAIN IN BOTH FEET: ICD-10-CM

## 2025-06-12 LAB
25(OH)D3 SERPL-MCNC: 34.7 NG/ML (ref 30–100)
ALBUMIN SERPL BCG-MCNC: 4.3 G/DL (ref 3.5–5)
ALP SERPL-CCNC: 71 U/L (ref 34–104)
ALT SERPL W P-5'-P-CCNC: 23 U/L (ref 7–52)
ANION GAP SERPL CALCULATED.3IONS-SCNC: 5 MMOL/L (ref 4–13)
AST SERPL W P-5'-P-CCNC: 19 U/L (ref 13–39)
BILIRUB SERPL-MCNC: 1.33 MG/DL (ref 0.2–1)
BILIRUB UR QL STRIP: NEGATIVE
BUN SERPL-MCNC: 28 MG/DL (ref 5–25)
CALCIUM SERPL-MCNC: 10 MG/DL (ref 8.4–10.2)
CHLORIDE SERPL-SCNC: 106 MMOL/L (ref 96–108)
CHOLEST SERPL-MCNC: 121 MG/DL (ref ?–200)
CLARITY UR: CLEAR
CO2 SERPL-SCNC: 30 MMOL/L (ref 21–32)
COLOR UR: NORMAL
CREAT SERPL-MCNC: 1.43 MG/DL (ref 0.6–1.3)
ERYTHROCYTE [DISTWIDTH] IN BLOOD BY AUTOMATED COUNT: 11.8 % (ref 11.6–15.1)
EST. AVERAGE GLUCOSE BLD GHB EST-MCNC: 128 MG/DL
GFR SERPL CREATININE-BSD FRML MDRD: 47 ML/MIN/1.73SQ M
GLUCOSE P FAST SERPL-MCNC: 101 MG/DL (ref 65–99)
GLUCOSE UR STRIP-MCNC: NEGATIVE MG/DL
HBA1C MFR BLD: 6.1 %
HCT VFR BLD AUTO: 41.4 % (ref 36.5–49.3)
HDLC SERPL-MCNC: 46 MG/DL
HGB BLD-MCNC: 13.6 G/DL (ref 12–17)
HGB UR QL STRIP.AUTO: NEGATIVE
KETONES UR STRIP-MCNC: NEGATIVE MG/DL
LDLC SERPL CALC-MCNC: 58 MG/DL (ref 0–100)
LEUKOCYTE ESTERASE UR QL STRIP: NEGATIVE
MCH RBC QN AUTO: 30.7 PG (ref 26.8–34.3)
MCHC RBC AUTO-ENTMCNC: 32.9 G/DL (ref 31.4–37.4)
MCV RBC AUTO: 94 FL (ref 82–98)
NITRITE UR QL STRIP: NEGATIVE
PH UR STRIP.AUTO: 6.5 [PH]
PLATELET # BLD AUTO: 307 THOUSANDS/UL (ref 149–390)
PMV BLD AUTO: 9.5 FL (ref 8.9–12.7)
POTASSIUM SERPL-SCNC: 4.7 MMOL/L (ref 3.5–5.3)
PROT SERPL-MCNC: 6.7 G/DL (ref 6.4–8.4)
PROT UR STRIP-MCNC: NEGATIVE MG/DL
PSA SERPL-MCNC: 0.35 NG/ML (ref 0–4)
RBC # BLD AUTO: 4.43 MILLION/UL (ref 3.88–5.62)
SODIUM SERPL-SCNC: 141 MMOL/L (ref 135–147)
SP GR UR STRIP.AUTO: 1.01 (ref 1–1.03)
TRIGL SERPL-MCNC: 87 MG/DL (ref ?–150)
TSH SERPL DL<=0.05 MIU/L-ACNC: 3.02 UIU/ML (ref 0.45–4.5)
UROBILINOGEN UR STRIP-ACNC: <2 MG/DL
WBC # BLD AUTO: 8.2 THOUSAND/UL (ref 4.31–10.16)

## 2025-06-12 PROCEDURE — 83036 HEMOGLOBIN GLYCOSYLATED A1C: CPT

## 2025-06-12 PROCEDURE — 84153 ASSAY OF PSA TOTAL: CPT

## 2025-06-12 PROCEDURE — 36415 COLL VENOUS BLD VENIPUNCTURE: CPT

## 2025-06-12 PROCEDURE — 80061 LIPID PANEL: CPT

## 2025-06-12 PROCEDURE — 84443 ASSAY THYROID STIM HORMONE: CPT

## 2025-06-12 PROCEDURE — 80053 COMPREHEN METABOLIC PANEL: CPT

## 2025-06-12 PROCEDURE — 85027 COMPLETE CBC AUTOMATED: CPT

## 2025-06-12 PROCEDURE — 81003 URINALYSIS AUTO W/O SCOPE: CPT

## 2025-06-12 PROCEDURE — 82306 VITAMIN D 25 HYDROXY: CPT

## 2025-06-19 ENCOUNTER — OFFICE VISIT (OUTPATIENT)
Dept: FAMILY MEDICINE CLINIC | Facility: CLINIC | Age: 75
End: 2025-06-19
Payer: COMMERCIAL

## 2025-06-19 VITALS
WEIGHT: 237.6 LBS | OXYGEN SATURATION: 96 % | HEART RATE: 69 BPM | BODY MASS INDEX: 27.49 KG/M2 | HEIGHT: 78 IN | SYSTOLIC BLOOD PRESSURE: 138 MMHG | DIASTOLIC BLOOD PRESSURE: 76 MMHG

## 2025-06-19 DIAGNOSIS — E55.9 VITAMIN D DEFICIENCY: ICD-10-CM

## 2025-06-19 DIAGNOSIS — R73.9 HYPERGLYCEMIA: ICD-10-CM

## 2025-06-19 DIAGNOSIS — I48.0 PAROXYSMAL ATRIAL FIBRILLATION (HCC): ICD-10-CM

## 2025-06-19 DIAGNOSIS — I10 ESSENTIAL HYPERTENSION: ICD-10-CM

## 2025-06-19 DIAGNOSIS — I71.40 ABDOMINAL AORTIC ANEURYSM (AAA) WITHOUT RUPTURE, UNSPECIFIED PART (HCC): Primary | ICD-10-CM

## 2025-06-19 DIAGNOSIS — Z79.01 ANTICOAGULATED BY ANTICOAGULATION TREATMENT: ICD-10-CM

## 2025-06-19 DIAGNOSIS — I71.21 ANEURYSM OF ASCENDING AORTA WITHOUT RUPTURE (HCC): ICD-10-CM

## 2025-06-19 DIAGNOSIS — R35.1 BENIGN PROSTATIC HYPERPLASIA WITH NOCTURIA: ICD-10-CM

## 2025-06-19 DIAGNOSIS — N40.1 BENIGN PROSTATIC HYPERPLASIA WITH NOCTURIA: ICD-10-CM

## 2025-06-19 DIAGNOSIS — E78.2 MIXED HYPERLIPIDEMIA: ICD-10-CM

## 2025-06-19 DIAGNOSIS — K63.5 POLYP OF COLON, UNSPECIFIED PART OF COLON, UNSPECIFIED TYPE: ICD-10-CM

## 2025-06-19 DIAGNOSIS — N18.31 STAGE 3A CHRONIC KIDNEY DISEASE (HCC): ICD-10-CM

## 2025-06-19 PROCEDURE — 99214 OFFICE O/P EST MOD 30 MIN: CPT | Performed by: FAMILY MEDICINE

## 2025-06-19 PROCEDURE — G2211 COMPLEX E/M VISIT ADD ON: HCPCS | Performed by: FAMILY MEDICINE

## 2025-06-19 NOTE — ASSESSMENT & PLAN NOTE
Lab Results   Component Value Date    EGFR 47 06/12/2025    EGFR 49 12/05/2024    EGFR 54 06/12/2024    CREATININE 1.43 (H) 06/12/2025    CREATININE 1.38 (H) 12/05/2024    CREATININE 1.29 06/12/2024   Stable on ARB follows with nephrology  Orders:  •  CBC; Future  •  Comprehensive metabolic panel; Future  •  Hemoglobin A1C; Future  •  Lipid Panel with Direct LDL reflex; Future  •  TSH, 3rd generation with Free T4 reflex; Future  •  UA (URINE) with reflex to Scope; Future  •  Vitamin D 25 hydroxy; Future

## 2025-06-19 NOTE — ASSESSMENT & PLAN NOTE
Hemoglobin A1c risen to 6.1.  Patient follow low sugar low carbohydrate diet  Orders:  •  CBC; Future  •  Comprehensive metabolic panel; Future  •  Hemoglobin A1C; Future  •  Lipid Panel with Direct LDL reflex; Future  •  TSH, 3rd generation with Free T4 reflex; Future  •  UA (URINE) with reflex to Scope; Future  •  Vitamin D 25 hydroxy; Future

## 2025-06-19 NOTE — PATIENT INSTRUCTIONS
Continue present therapy  Follow a low sugar low carbohydrate diet  Follow with all specialist further instructions  Return in 6 months for office visit, blood work, and AWV

## 2025-06-19 NOTE — PROGRESS NOTES
Name: Kamaljit Bhatia      : 1950      MRN: [id number]  Encounter Provider: Alex Maradiaga DO  Encounter Date: 2025   Encounter department: [facility]  Return in 6 months for office visit, blood work, and AWV  :  Assessment & Plan  Abdominal aortic aneurysm (AAA) without rupture, unspecified part (HCC)  Stable follows with vascular surgery  Orders:  •  CBC; Future  •  Comprehensive metabolic panel; Future  •  Hemoglobin A1C; Future  •  Lipid Panel with Direct LDL reflex; Future  •  TSH, 3rd generation with Free T4 reflex; Future  •  UA (URINE) with reflex to Scope; Future  •  Vitamin D 25 hydroxy; Future    Essential hypertension  Stable continue present therapy  Orders:  •  CBC; Future  •  Comprehensive metabolic panel; Future  •  Hemoglobin A1C; Future  •  Lipid Panel with Direct LDL reflex; Future  •  TSH, 3rd generation with Free T4 reflex; Future  •  UA (URINE) with reflex to Scope; Future  •  Vitamin D 25 hydroxy; Future    Paroxysmal atrial fibrillation (HCC)  Stable follows with cardiology in NSR by auscultation today  Orders:  •  CBC; Future  •  Comprehensive metabolic panel; Future  •  Hemoglobin A1C; Future  •  Lipid Panel with Direct LDL reflex; Future  •  TSH, 3rd generation with Free T4 reflex; Future  •  UA (URINE) with reflex to Scope; Future  •  Vitamin D 25 hydroxy; Future    Aneurysm of ascending aorta without rupture (HCC)  Stable follows with vascular surgery  Orders:  •  CBC; Future  •  Comprehensive metabolic panel; Future  •  Hemoglobin A1C; Future  •  Lipid Panel with Direct LDL reflex; Future  •  TSH, 3rd generation with Free T4 reflex; Future  •  UA (URINE) with reflex to Scope; Future  •  Vitamin D 25 hydroxy; Future    Polyp of colon, unspecified part of colon, unspecified type  Up-to-date with colonoscopy  Orders:  •  CBC; Future  •  Comprehensive metabolic panel; Future  •  Hemoglobin A1C; Future  •  Lipid Panel with Direct LDL reflex; Future  •   TSH, 3rd generation with Free T4 reflex; Future  •  UA (URINE) with reflex to Scope; Future  •  Vitamin D 25 hydroxy; Future    Stage 3a chronic kidney disease (HCC)  Lab Results   Component Value Date    EGFR 47 06/12/2025    EGFR 49 12/05/2024    EGFR 54 06/12/2024    CREATININE 1.43 (H) 06/12/2025    CREATININE 1.38 (H) 12/05/2024    CREATININE 1.29 06/12/2024   Stable on ARB follows with nephrology  Orders:  •  CBC; Future  •  Comprehensive metabolic panel; Future  •  Hemoglobin A1C; Future  •  Lipid Panel with Direct LDL reflex; Future  •  TSH, 3rd generation with Free T4 reflex; Future  •  UA (URINE) with reflex to Scope; Future  •  Vitamin D 25 hydroxy; Future    Anticoagulated by anticoagulation treatment  Continue with Xarelto  Orders:  •  CBC; Future  •  Comprehensive metabolic panel; Future  •  Hemoglobin A1C; Future  •  Lipid Panel with Direct LDL reflex; Future  •  TSH, 3rd generation with Free T4 reflex; Future  •  UA (URINE) with reflex to Scope; Future  •  Vitamin D 25 hydroxy; Future    Benign prostatic hyperplasia with nocturia  Stable PSA normal.  Patient's age 74 no PSA is warranted further  Orders:  •  CBC; Future  •  Comprehensive metabolic panel; Future  •  Hemoglobin A1C; Future  •  Lipid Panel with Direct LDL reflex; Future  •  TSH, 3rd generation with Free T4 reflex; Future  •  UA (URINE) with reflex to Scope; Future  •  Vitamin D 25 hydroxy; Future    Hyperglycemia  Hemoglobin A1c risen to 6.1.  Patient follow low sugar low carbohydrate diet  Orders:  •  CBC; Future  •  Comprehensive metabolic panel; Future  •  Hemoglobin A1C; Future  •  Lipid Panel with Direct LDL reflex; Future  •  TSH, 3rd generation with Free T4 reflex; Future  •  UA (URINE) with reflex to Scope; Future  •  Vitamin D 25 hydroxy; Future    Mixed hyperlipidemia  Stable continue atorvastatin 40 mg daily  Orders:  •  CBC; Future  •  Comprehensive metabolic panel; Future  •  Hemoglobin A1C; Future  •  Lipid Panel with  "Direct LDL reflex; Future  •  TSH, 3rd generation with Free T4 reflex; Future  •  UA (URINE) with reflex to Scope; Future  •  Vitamin D 25 hydroxy; Future    Vitamin D deficiency  Stable continue to monitor  Orders:  •  CBC; Future  •  Comprehensive metabolic panel; Future  •  Hemoglobin A1C; Future  •  Lipid Panel with Direct LDL reflex; Future  •  TSH, 3rd generation with Free T4 reflex; Future  •  UA (URINE) with reflex to Scope; Future  •  Vitamin D 25 hydroxy; Future          Depression Screening and Follow-up Plan: Patient was screened for depression during today's encounter. They screened negative with a PHQ-2 score of 0.        History of Present Illness   Patient doing well without any medical plaints concerns at the present time.  Blood work was reviewed.  Patient did lose 5 pound since last office visit      Review of Systems   Constitutional: Negative.    HENT: Negative.     Eyes: Negative.    Respiratory: Negative.     Cardiovascular: Negative.    Gastrointestinal: Negative.    Endocrine: Negative.    Genitourinary: Negative.    Musculoskeletal: Negative.    Skin: Negative.    Allergic/Immunologic: Negative.    Neurological: Negative.    Hematological: Negative.    Psychiatric/Behavioral: Negative.         Objective   /76 (BP Location: Right arm, Patient Position: Sitting, Cuff Size: Standard)   Pulse 69   Ht 6' 6\" (1.981 m)   Wt 108 kg (237 lb 9.6 oz)   SpO2 96%   BMI 27.46 kg/m²      Physical Exam  Vitals and nursing note reviewed.   Constitutional:       Appearance: Normal appearance.   HENT:      Head: Normocephalic and atraumatic.      Mouth/Throat:      Mouth: Mucous membranes are moist.     Eyes:      General: No scleral icterus.    Neck:      Vascular: No carotid bruit.     Cardiovascular:      Rate and Rhythm: Normal rate and regular rhythm.      Heart sounds: Normal heart sounds.   Pulmonary:      Effort: Pulmonary effort is normal.      Breath sounds: Normal breath sounds. "   Abdominal:      Palpations: Abdomen is soft.      Tenderness: There is no abdominal tenderness.     Musculoskeletal:      Cervical back: Neck supple.      Right lower leg: No edema.      Left lower leg: No edema.     Skin:     General: Skin is warm and dry.     Neurological:      General: No focal deficit present.      Mental Status: He is alert.     Psychiatric:         Mood and Affect: Mood normal.

## 2025-06-19 NOTE — ASSESSMENT & PLAN NOTE
Stable follows with vascular surgery  Orders:  •  CBC; Future  •  Comprehensive metabolic panel; Future  •  Hemoglobin A1C; Future  •  Lipid Panel with Direct LDL reflex; Future  •  TSH, 3rd generation with Free T4 reflex; Future  •  UA (URINE) with reflex to Scope; Future  •  Vitamin D 25 hydroxy; Future

## 2025-06-19 NOTE — ASSESSMENT & PLAN NOTE
Stable PSA normal.  Patient's age 74 no PSA is warranted further  Orders:  •  CBC; Future  •  Comprehensive metabolic panel; Future  •  Hemoglobin A1C; Future  •  Lipid Panel with Direct LDL reflex; Future  •  TSH, 3rd generation with Free T4 reflex; Future  •  UA (URINE) with reflex to Scope; Future  •  Vitamin D 25 hydroxy; Future

## 2025-06-19 NOTE — ASSESSMENT & PLAN NOTE
Stable follows with cardiology in NSR by auscultation today  Orders:  •  CBC; Future  •  Comprehensive metabolic panel; Future  •  Hemoglobin A1C; Future  •  Lipid Panel with Direct LDL reflex; Future  •  TSH, 3rd generation with Free T4 reflex; Future  •  UA (URINE) with reflex to Scope; Future  •  Vitamin D 25 hydroxy; Future

## 2025-06-19 NOTE — ASSESSMENT & PLAN NOTE
Continue with Xarelto  Orders:  •  CBC; Future  •  Comprehensive metabolic panel; Future  •  Hemoglobin A1C; Future  •  Lipid Panel with Direct LDL reflex; Future  •  TSH, 3rd generation with Free T4 reflex; Future  •  UA (URINE) with reflex to Scope; Future  •  Vitamin D 25 hydroxy; Future